# Patient Record
Sex: MALE | Race: WHITE | NOT HISPANIC OR LATINO
[De-identification: names, ages, dates, MRNs, and addresses within clinical notes are randomized per-mention and may not be internally consistent; named-entity substitution may affect disease eponyms.]

---

## 2015-03-31 RX ORDER — AMLODIPINE BESYLATE 2.5 MG/1
2 TABLET ORAL
Qty: 0 | Refills: 0 | DISCHARGE
Start: 2015-03-31

## 2017-01-03 ENCOUNTER — RX RENEWAL (OUTPATIENT)
Age: 62
End: 2017-01-03

## 2017-01-09 ENCOUNTER — APPOINTMENT (OUTPATIENT)
Dept: CARDIOLOGY | Facility: CLINIC | Age: 62
End: 2017-01-09

## 2017-01-09 VITALS
HEART RATE: 72 BPM | OXYGEN SATURATION: 97 % | SYSTOLIC BLOOD PRESSURE: 130 MMHG | HEIGHT: 73 IN | DIASTOLIC BLOOD PRESSURE: 75 MMHG | BODY MASS INDEX: 41.35 KG/M2 | WEIGHT: 312 LBS

## 2017-05-22 ENCOUNTER — APPOINTMENT (OUTPATIENT)
Dept: CARDIOLOGY | Facility: CLINIC | Age: 62
End: 2017-05-22

## 2017-05-22 ENCOUNTER — NON-APPOINTMENT (OUTPATIENT)
Age: 62
End: 2017-05-22

## 2017-05-22 VITALS
HEART RATE: 62 BPM | WEIGHT: 282 LBS | OXYGEN SATURATION: 100 % | SYSTOLIC BLOOD PRESSURE: 136 MMHG | DIASTOLIC BLOOD PRESSURE: 72 MMHG | BODY MASS INDEX: 37.21 KG/M2

## 2017-05-26 ENCOUNTER — RX RENEWAL (OUTPATIENT)
Age: 62
End: 2017-05-26

## 2017-06-22 ENCOUNTER — APPOINTMENT (OUTPATIENT)
Dept: CARDIOLOGY | Facility: CLINIC | Age: 62
End: 2017-06-22

## 2017-06-25 ENCOUNTER — INPATIENT (INPATIENT)
Facility: HOSPITAL | Age: 62
LOS: 0 days | Discharge: HOME | DRG: 204 | End: 2017-06-26
Attending: HOSPITALIST | Admitting: HOSPITALIST
Payer: COMMERCIAL

## 2017-06-25 VITALS
SYSTOLIC BLOOD PRESSURE: 141 MMHG | HEART RATE: 77 BPM | DIASTOLIC BLOOD PRESSURE: 74 MMHG | RESPIRATION RATE: 18 BRPM | OXYGEN SATURATION: 97 % | TEMPERATURE: 98 F

## 2017-06-25 DIAGNOSIS — E78.5 HYPERLIPIDEMIA, UNSPECIFIED: ICD-10-CM

## 2017-06-25 DIAGNOSIS — Z29.9 ENCOUNTER FOR PROPHYLACTIC MEASURES, UNSPECIFIED: ICD-10-CM

## 2017-06-25 DIAGNOSIS — Z95.5 PRESENCE OF CORONARY ANGIOPLASTY IMPLANT AND GRAFT: Chronic | ICD-10-CM

## 2017-06-25 DIAGNOSIS — R06.02 SHORTNESS OF BREATH: ICD-10-CM

## 2017-06-25 DIAGNOSIS — I25.10 ATHEROSCLEROTIC HEART DISEASE OF NATIVE CORONARY ARTERY WITHOUT ANGINA PECTORIS: ICD-10-CM

## 2017-06-25 DIAGNOSIS — E11.9 TYPE 2 DIABETES MELLITUS WITHOUT COMPLICATIONS: ICD-10-CM

## 2017-06-25 DIAGNOSIS — I10 ESSENTIAL (PRIMARY) HYPERTENSION: ICD-10-CM

## 2017-06-25 LAB
ALBUMIN SERPL ELPH-MCNC: 4.1 G/DL — SIGNIFICANT CHANGE UP (ref 3.3–5)
ALP SERPL-CCNC: 85 U/L — SIGNIFICANT CHANGE UP (ref 40–120)
ALT FLD-CCNC: 16 U/L RC — SIGNIFICANT CHANGE UP (ref 10–45)
ANION GAP SERPL CALC-SCNC: 11 MMOL/L — SIGNIFICANT CHANGE UP (ref 5–17)
APTT BLD: 40.4 SEC — HIGH (ref 27.5–37.4)
AST SERPL-CCNC: 12 U/L — SIGNIFICANT CHANGE UP (ref 10–40)
BASOPHILS # BLD AUTO: 0.1 K/UL — SIGNIFICANT CHANGE UP (ref 0–0.2)
BASOPHILS NFR BLD AUTO: 0.7 % — SIGNIFICANT CHANGE UP (ref 0–2)
BILIRUB SERPL-MCNC: 0.3 MG/DL — SIGNIFICANT CHANGE UP (ref 0.2–1.2)
BUN SERPL-MCNC: 14 MG/DL — SIGNIFICANT CHANGE UP (ref 7–23)
CALCIUM SERPL-MCNC: 9.5 MG/DL — SIGNIFICANT CHANGE UP (ref 8.4–10.5)
CHLORIDE SERPL-SCNC: 100 MMOL/L — SIGNIFICANT CHANGE UP (ref 96–108)
CK MB BLD-MCNC: 1.6 % — SIGNIFICANT CHANGE UP (ref 0–3.5)
CK MB CFR SERPL CALC: 2.4 NG/ML — SIGNIFICANT CHANGE UP (ref 0–6.7)
CK SERPL-CCNC: 146 U/L — SIGNIFICANT CHANGE UP (ref 30–200)
CO2 SERPL-SCNC: 27 MMOL/L — SIGNIFICANT CHANGE UP (ref 22–31)
CREAT SERPL-MCNC: 1 MG/DL — SIGNIFICANT CHANGE UP (ref 0.5–1.3)
EOSINOPHIL # BLD AUTO: 0.1 K/UL — SIGNIFICANT CHANGE UP (ref 0–0.5)
EOSINOPHIL NFR BLD AUTO: 1.7 % — SIGNIFICANT CHANGE UP (ref 0–6)
GAS PNL BLDV: SIGNIFICANT CHANGE UP
GLUCOSE SERPL-MCNC: 146 MG/DL — HIGH (ref 70–99)
HCT VFR BLD CALC: 39 % — SIGNIFICANT CHANGE UP (ref 39–50)
HGB BLD-MCNC: 13.7 G/DL — SIGNIFICANT CHANGE UP (ref 13–17)
INR BLD: 1.01 RATIO — SIGNIFICANT CHANGE UP (ref 0.88–1.16)
LYMPHOCYTES # BLD AUTO: 1.8 K/UL — SIGNIFICANT CHANGE UP (ref 1–3.3)
LYMPHOCYTES # BLD AUTO: 22.8 % — SIGNIFICANT CHANGE UP (ref 13–44)
MCHC RBC-ENTMCNC: 29.8 PG — SIGNIFICANT CHANGE UP (ref 27–34)
MCHC RBC-ENTMCNC: 35.2 GM/DL — SIGNIFICANT CHANGE UP (ref 32–36)
MCV RBC AUTO: 84.9 FL — SIGNIFICANT CHANGE UP (ref 80–100)
MONOCYTES # BLD AUTO: 0.6 K/UL — SIGNIFICANT CHANGE UP (ref 0–0.9)
MONOCYTES NFR BLD AUTO: 7.4 % — SIGNIFICANT CHANGE UP (ref 2–14)
NEUTROPHILS # BLD AUTO: 5.3 K/UL — SIGNIFICANT CHANGE UP (ref 1.8–7.4)
NEUTROPHILS NFR BLD AUTO: 67.3 % — SIGNIFICANT CHANGE UP (ref 43–77)
NT-PROBNP SERPL-SCNC: 46 PG/ML — SIGNIFICANT CHANGE UP (ref 0–300)
PLATELET # BLD AUTO: 262 K/UL — SIGNIFICANT CHANGE UP (ref 150–400)
POTASSIUM SERPL-MCNC: 3.6 MMOL/L — SIGNIFICANT CHANGE UP (ref 3.5–5.3)
POTASSIUM SERPL-SCNC: 3.6 MMOL/L — SIGNIFICANT CHANGE UP (ref 3.5–5.3)
PROT SERPL-MCNC: 6.9 G/DL — SIGNIFICANT CHANGE UP (ref 6–8.3)
PROTHROM AB SERPL-ACNC: 10.9 SEC — SIGNIFICANT CHANGE UP (ref 9.8–12.7)
RBC # BLD: 4.6 M/UL — SIGNIFICANT CHANGE UP (ref 4.2–5.8)
RBC # FLD: 12.9 % — SIGNIFICANT CHANGE UP (ref 10.3–14.5)
SODIUM SERPL-SCNC: 138 MMOL/L — SIGNIFICANT CHANGE UP (ref 135–145)
TROPONIN T SERPL-MCNC: <0.01 NG/ML — SIGNIFICANT CHANGE UP (ref 0–0.06)
WBC # BLD: 7.9 K/UL — SIGNIFICANT CHANGE UP (ref 3.8–10.5)
WBC # FLD AUTO: 7.9 K/UL — SIGNIFICANT CHANGE UP (ref 3.8–10.5)

## 2017-06-25 PROCEDURE — 99285 EMERGENCY DEPT VISIT HI MDM: CPT | Mod: 25

## 2017-06-25 PROCEDURE — 99223 1ST HOSP IP/OBS HIGH 75: CPT | Mod: AI,GC

## 2017-06-25 PROCEDURE — 71010: CPT | Mod: 26

## 2017-06-25 PROCEDURE — 93010 ELECTROCARDIOGRAM REPORT: CPT | Mod: NC

## 2017-06-25 RX ORDER — ASPIRIN/CALCIUM CARB/MAGNESIUM 324 MG
325 TABLET ORAL DAILY
Qty: 0 | Refills: 0 | Status: DISCONTINUED | OUTPATIENT
Start: 2017-06-26 | End: 2017-06-26

## 2017-06-25 RX ORDER — DEXTROSE 50 % IN WATER 50 %
1 SYRINGE (ML) INTRAVENOUS ONCE
Qty: 0 | Refills: 0 | Status: DISCONTINUED | OUTPATIENT
Start: 2017-06-25 | End: 2017-06-26

## 2017-06-25 RX ORDER — GLUCAGON INJECTION, SOLUTION 0.5 MG/.1ML
1 INJECTION, SOLUTION SUBCUTANEOUS ONCE
Qty: 0 | Refills: 0 | Status: DISCONTINUED | OUTPATIENT
Start: 2017-06-25 | End: 2017-06-26

## 2017-06-25 RX ORDER — DEXTROSE 50 % IN WATER 50 %
25 SYRINGE (ML) INTRAVENOUS ONCE
Qty: 0 | Refills: 0 | Status: DISCONTINUED | OUTPATIENT
Start: 2017-06-25 | End: 2017-06-26

## 2017-06-25 RX ORDER — LOSARTAN POTASSIUM 100 MG/1
100 TABLET, FILM COATED ORAL DAILY
Qty: 0 | Refills: 0 | Status: DISCONTINUED | OUTPATIENT
Start: 2017-06-26 | End: 2017-06-26

## 2017-06-25 RX ORDER — ASPIRIN/CALCIUM CARB/MAGNESIUM 324 MG
1 TABLET ORAL
Qty: 0 | Refills: 0 | COMMUNITY

## 2017-06-25 RX ORDER — INSULIN LISPRO 100/ML
VIAL (ML) SUBCUTANEOUS
Qty: 0 | Refills: 0 | Status: DISCONTINUED | OUTPATIENT
Start: 2017-06-25 | End: 2017-06-26

## 2017-06-25 RX ORDER — SIMVASTATIN 20 MG/1
40 TABLET, FILM COATED ORAL AT BEDTIME
Qty: 0 | Refills: 0 | Status: DISCONTINUED | OUTPATIENT
Start: 2017-06-26 | End: 2017-06-26

## 2017-06-25 RX ORDER — AMLODIPINE BESYLATE 2.5 MG/1
2.5 TABLET ORAL DAILY
Qty: 0 | Refills: 0 | Status: DISCONTINUED | OUTPATIENT
Start: 2017-06-26 | End: 2017-06-26

## 2017-06-25 RX ORDER — HYDROCHLOROTHIAZIDE 25 MG
12.5 TABLET ORAL DAILY
Qty: 0 | Refills: 0 | Status: DISCONTINUED | OUTPATIENT
Start: 2017-06-26 | End: 2017-06-26

## 2017-06-25 RX ORDER — CLOPIDOGREL BISULFATE 75 MG/1
75 TABLET, FILM COATED ORAL DAILY
Qty: 0 | Refills: 0 | Status: DISCONTINUED | OUTPATIENT
Start: 2017-06-26 | End: 2017-06-26

## 2017-06-25 RX ORDER — DEXTROSE 50 % IN WATER 50 %
12.5 SYRINGE (ML) INTRAVENOUS ONCE
Qty: 0 | Refills: 0 | Status: DISCONTINUED | OUTPATIENT
Start: 2017-06-25 | End: 2017-06-26

## 2017-06-25 RX ORDER — ASPIRIN/CALCIUM CARB/MAGNESIUM 324 MG
243 TABLET ORAL ONCE
Qty: 0 | Refills: 0 | Status: DISCONTINUED | OUTPATIENT
Start: 2017-06-25 | End: 2017-06-25

## 2017-06-25 RX ORDER — SODIUM CHLORIDE 9 MG/ML
1000 INJECTION, SOLUTION INTRAVENOUS
Qty: 0 | Refills: 0 | Status: DISCONTINUED | OUTPATIENT
Start: 2017-06-25 | End: 2017-06-26

## 2017-06-25 RX ORDER — HEPARIN SODIUM 5000 [USP'U]/ML
5000 INJECTION INTRAVENOUS; SUBCUTANEOUS EVERY 8 HOURS
Qty: 0 | Refills: 0 | Status: DISCONTINUED | OUTPATIENT
Start: 2017-06-25 | End: 2017-06-26

## 2017-06-25 RX ORDER — METOPROLOL TARTRATE 50 MG
25 TABLET ORAL
Qty: 0 | Refills: 0 | Status: DISCONTINUED | OUTPATIENT
Start: 2017-06-25 | End: 2017-06-26

## 2017-06-25 NOTE — ED ADULT NURSE NOTE - PMH
BPH (benign prostatic hyperplasia)    CAD (coronary artery disease)    Dyslipidemia    Gout    Hypertension    Morbid obesity

## 2017-06-25 NOTE — H&P ADULT - PROBLEM SELECTOR PLAN 1
One day hx of SOB, exacerbated on exertion, not associated with CP/jaw pain/shoulder pain. Concern for unstable angina given extensive cardiac history (17 stents) and T-wave inversions on serial EKG's in ED.   -Contact outpatient cardiologist in AM.  -TTE  -Trend CE's  -Repeat EKG given t-wave inversion in V1 on last EKG  -Receieved ASA in ED. c/w home ASA 81, simvastatin. One day hx of SOB, exacerbated on exertion, not associated with CP/jaw pain/shoulder pain. Concern for unstable angina given extensive cardiac history (17 stents) and T-wave inversions on serial EKG's in ED.   -Contact outpatient cardiologist in AM.  -TTE  -Trend CE's  -Repeat EKG given t-wave inversion in V1 on last EKG  -Received ASA in ED. c/w home , simvastatin.

## 2017-06-25 NOTE — ED ADULT NURSE NOTE - ED STAT RN HANDOFF DETAILS 2
Report received from Mely FLORES. Patient is admitted to telemetry. A&Ox3, VSS, wife at bedside. Safety provided, will continue to monitor.

## 2017-06-25 NOTE — ED PROVIDER NOTE - MEDICAL DECISION MAKING DETAILS
61 y.o. male pw shortness of breath with exertion and lightheadedness, concern for cardiac cause (i.e chf vs ACS), PE unlikely given presentation. Will check labs, cardiac enzymes, ekg, cxr, and likely admit.

## 2017-06-25 NOTE — H&P ADULT - HISTORY OF PRESENT ILLNESS
58yo M with PMH of HTN, HLD, gout, DM2 on metformin, CAD with prior stents x 17 (last cath April 2015 s/p PCI LAD), obesity p/w shortness of breath x 1 day beginning after awakening this AM, exacerbated by exertion.     ED course: Vitals T 98.1, 's-150's/70's, HR 60's-70's, RR 18, SpO2 98% on RA. No medications administered. Labs significant for aPTT 40.4, Glucose 146, BNP 46, CE's negative, VBG pH 7.46 60yo M with PMH of HTN, HLD, gout, DM2 on metformin, CAD with prior stents x 17 (last cath April 2015 s/p PCI LAD), obesity p/w shortness of breath x 1 day beginning after awakening this AM, exacerbated by exertion. He woke up this morning and felt acutely short of breath without associated chest pain, n/v/d/c, jaw or shoulder pain, abdominal pain. Of note, the patient feels short of breath with mild exertion at baseline and has extensive cardiac history as above. He was also recently diagnosed with DM2 about 5 months ago with blood glucose in 300's at that time and started on metformin. He has a 40 pack-year smoking history, smoking 2 ppd for 20 years, quitting in 2009. His last cardiac cath in our system was in April 2015 at which time he had PCI to LAD.     ED course: Vitals T 98.1, 's-150's/70's, HR 60's-70's, RR 18, SpO2 98% on RA. No medications administered. Labs significant for aPTT 40.4, Glucose 146, BNP 46, CE's negative, VBG pH 7.46. Serial EKG's show normal sinus rhythm with interval development of T-wave inversion in V1, no ABDIAZIZ. CXR clear lungs. Ordered for aspirin in the ED. 58yo M with PMH of HTN, HLD, gout, DM2 on metformin, CAD with prior stents x 17 (last cath April 2015 s/p PCI LAD), obesity p/w shortness of breath x 1 day beginning after awakening this AM, exacerbated by exertion. He woke up this morning and felt acutely short of breath without associated chest pain, n/v/d/c, jaw or shoulder pain, abdominal pain. Of note, the patient feels short of breath with mild exertion at baseline and has extensive cardiac history as above. He was also recently diagnosed with DM2 about 5 months ago with blood glucose in 300's at that time and started on metformin. He has a 40 pack-year smoking history, smoking 2 ppd for 20 years, quitting in 2009. His last cardiac cath in our system was in April 2015 at which time he had PCI to LAD. Pt denies recent travel history, prolonged immobilization, LE pain or swelling and has no VTE history.     ED course: Vitals T 98.1, 's-150's/70's, HR 60's-70's, RR 18, SpO2 98% on RA. No medications administered. Labs significant for aPTT 40.4, Glucose 146, BNP 46, CE's negative, VBG pH 7.46. Serial EKG's show normal sinus rhythm with interval development of T-wave inversion in V1, no ABDIAZIZ. CXR clear lungs. Ordered for aspirin in the ED. 62yo M with PMH of HTN, HLD, gout, DM2 on metformin, CAD with prior stents x 17 (last cath April 2015 s/p PCI LAD), obesity p/w shortness of breath x 1 day beginning after awakening this AM, exacerbated by exertion. He woke up this morning and felt acutely short of breath without associated chest pain, n/v/d/c, jaw or shoulder pain, abdominal pain. Of note, the patient feels short of breath with mild exertion at baseline and has extensive cardiac history as above. He was also recently diagnosed with DM2 about 5 months ago with blood glucose in 300's at that time and started on metformin. He has a 40 pack-year smoking history, smoking 2 ppd for 20 years, quitting in 2009. His last cardiac cath in our system was in April 2015 at which time he had PCI to LAD. Pt denies recent travel history, prolonged immobilization, LE pain or swelling and has no VTE history.     ED course: Vitals T 98.1, 's-150's/70's, HR 60's-70's, RR 18, SpO2 98% on RA. No medications administered. Labs significant for aPTT 40.4, Glucose 146, BNP 46, CE's negative, VBG pH 7.46. Serial EKG's show normal sinus rhythm with interval development of T-wave inversion in V1, no ABDIAZIZ. CXR clear lungs. Ordered for aspirin in the ED.

## 2017-06-25 NOTE — H&P ADULT - PROBLEM SELECTOR PLAN 2
Extensive cardiac history with 17 stents, followed closely by outpatient cardiology. As above, will r/o ACS given new SOB at rest concerning for unstable angina.   -As above.  -c/w home ASA, statin, plavix 75 q24h.

## 2017-06-25 NOTE — H&P ADULT - PMH
BPH (benign prostatic hyperplasia)    CAD (coronary artery disease)    Diabetes    Dyslipidemia    Gout    Hypertension    Morbid obesity

## 2017-06-25 NOTE — H&P ADULT - ATTENDING COMMENTS
60yo M with PMH of HTN, HLD, gout, DM2 on metformin, CAD with prior stents x 17 (last cath April 2015 s/p PCI LAD), obesity p/w shortness of breath, reports increased CAGE and decreased ET over past few weeks,  NAD on exam, neg cardiac enzymes x 1 , serial EKG with twave changes in v1 and AVL; High risk patient with anginal equivalent symptoms currently resolved ad stable will therefore not treat with ACS protocol at this time, but will monitor on telemetry and obtain serial cardiac enzymes ,  will be seen by cardiology in daytime to decide if patient needs cath.

## 2017-06-25 NOTE — ED ADULT NURSE NOTE - PSH
History of coronary artery stent placement  Last stent was 2 weeks ago  S/P coronary artery stent placement  x 1 to Prox circ & IVUS

## 2017-06-25 NOTE — H&P ADULT - NSHPSOCIALHISTORY_GEN_ALL_CORE
Former smoker 40 pack years, quit 2009, smoked 20 years 2 ppd. No alcohol or recreational drug use. Lives with his wife.

## 2017-06-25 NOTE — H&P ADULT - NSHPLABSRESULTS_GEN_ALL_CORE
13.7   7.9   )-----------( 262      ( 25 Jun 2017 17:10 )             39.0     06-25    138  |  100  |  14  ----------------------------<  146<H>  3.6   |  27  |  1.00    Ca    9.5      25 Jun 2017 17:10    TPro  6.9  /  Alb  4.1  /  TBili  0.3  /  DBili  x   /  AST  12  /  ALT  16  /  AlkPhos  85  06-25    CARDIAC MARKERS ( 25 Jun 2017 17:10 )  x     / <0.01 ng/mL / 146 U/L / x     / 2.4 ng/mL EKG personally reviewed by me and showed T-wave inversions on repeat EKG in V1, normal sinus rhythm.    CXR personally reviewed by me and showed clear lungs.    Labs personally reviewed by me and significant for BNP normal, CE's negative x 1, Glucose 140's.

## 2017-06-25 NOTE — H&P ADULT - ASSESSMENT
60yo M with PMH of HTN, HLD, gout, DM2 on metformin, CAD with prior stents x 17 (last cath April 2015 s/p PCI LAD), obesity p/w shortness of breath x 1 day beginning after awakening this AM, exacerbated by exertion a/w r/o ACS with T-wave inversions on serial EKG's in ED. 62yo M with PMH of HTN, HLD, gout, DM2 on metformin, CAD with prior stents x 17 (last cath April 2015 s/p PCI LAD), obesity p/w shortness of breath x 1 day beginning after awakening this AM, exacerbated by exertion a/w r/o ACS with T-wave inversions on serial EKG's in ED.

## 2017-06-25 NOTE — ED ADULT NURSE NOTE - OBJECTIVE STATEMENT
61 y.o male presents to the ed c.o shortness of breath and not feeling well. hx of MI in 2009, 2010, 2015, hx of diabetes type 2, 17 stents, smoking quit in 2009, hld. pt states " I just didn't feel well today, I don't have chest pain, but I do have a heaviness in my chest. right leg swelling noted by wife. pt is compliant with all medications. placed on monitor, NRS noted. lung sounds clear throughout, pt vital signs are stable. denies cough/back pain/chills/fever/n/v/d/left arm tingling/numbness/denies neck/jaw pain. wife at the bedside. denies dizziness/lightheadedness /visual changes.   Patient undressed and placed into gown, call bell in hand and side rails up for safety. warm blanket provided, vital signs stable, pt in no acute distress.

## 2017-06-25 NOTE — H&P ADULT - PROBLEM SELECTOR PLAN 3
Recently diagnosed 5 months ago with DM2 with BG at that time in the 300's. Has been on metformin (unknown dose) BID.   -Hold home metformin.  -ISS and hypoglycemia protocol.  -FS's with meals and qhs.

## 2017-06-25 NOTE — ED PROVIDER NOTE - ATTENDING CONTRIBUTION TO CARE
Attending MD Cagle.  Agree with above.  Pt has hx of 17 stents on ASA, Plavix, DMII, on metformin presenting with exertional CP x 1 day.  No CP.  Endorses light-headedness with exertion.  No recent travel, fevers, chills, cough.  Pt is well appearing with soft rales to bilateral lower lobes.  Pt has markedly elevated ACS risk given hx and risk factors.  No pleuritic CP/hypoxia/tachycardia and anticoagulated so likely low risk PE.  Pt is relatively asymptomatic at rest.  Planned ACS eval in ED.  Pt’s presentation is c/w stent occlusion/ACS vs. CHF.  BNP is not elevated, CXR is clear.  No marked LE edema.  Likely ACS/blockage, EKG at rest non-actionable.  Warrants admission for stress test/cath for further eval and management.  Pt had echo recently as outpt but these results are not available in ED today and pt has not been made aware of findings.  Stable for admission to [facility] medicine.

## 2017-06-25 NOTE — ED PROVIDER NOTE - PHYSICAL EXAMINATION
Gen: Well appearing, NAD, AOx3  Head: NCAT  HEENT: MMM, normal conjunctiva  Lung: rales at bases, no wheezes or rhonchi  CV: S1/S2, no murmurs, rubs or gallops  Abd: soft, NTND, no rebound or guarding  MSK: No CVA tenderness. No edema, no visible deformities  Neuro: No focal neurologic deficits.   Skin: Warm and dry, no evidence of rash  Psych: normal mood and affect

## 2017-06-25 NOTE — ED PROVIDER NOTE - OBJECTIVE STATEMENT
61 y.o. male hx of CAD sp multiple stents on ASA and plavix, DM on metformin, HTN, HLD pw shortness of breath x 1 day, started after waking this morning, worse with exertion, better with rest. No associated chest pain or nausea, diaphoresis. Also with lightheadedness at time. No priors. No lower extremity swelling. No recent travel.

## 2017-06-25 NOTE — H&P ADULT - NSHPPHYSICALEXAM_GEN_ALL_CORE
PHYSICAL EXAM:  GENERAL: NAD, well-developed, obese  HEAD:  Atraumatic, Normocephalic  EYES: EOMI, PERRLA, conjunctiva and sclera clear  NECK: Supple, No JVD  CHEST/LUNG: Clear to auscultation bilaterally; No wheeze  HEART: Regular rate and rhythm; No murmurs, rubs, or gallops  ABDOMEN: Soft, Nontender, Nondistended; Bowel sounds present  EXTREMITIES:  2+ Peripheral Pulses, No clubbing, cyanosis, or edema  PSYCH: AAOx3  NEUROLOGY: non-focal  SKIN: No rashes or lesions

## 2017-06-25 NOTE — H&P ADULT - PROBLEM SELECTOR PLAN 4
BP well-controlled in ED. Will c/w home anti-hypertensives.  -c/w home amlodipine 2.5mg q24h, HCTZ, losartan 100mg q24h, metoprolol 25mg BID.

## 2017-06-25 NOTE — ED PROVIDER NOTE - NS ED ROS FT
ROS: denies HA, weakness, dizziness, fevers/chills, nausea/vomiting, chest pain, diaphoresis, abdominal pain, back/neck pain, dysuria/hematuria, or rash  +sob, lightheadedness

## 2017-06-26 VITALS
HEART RATE: 56 BPM | RESPIRATION RATE: 18 BRPM | SYSTOLIC BLOOD PRESSURE: 162 MMHG | TEMPERATURE: 98 F | DIASTOLIC BLOOD PRESSURE: 90 MMHG | OXYGEN SATURATION: 99 %

## 2017-06-26 LAB
ALBUMIN SERPL ELPH-MCNC: 3.9 G/DL — SIGNIFICANT CHANGE UP (ref 3.3–5)
ALP SERPL-CCNC: 69 U/L — SIGNIFICANT CHANGE UP (ref 40–120)
ALT FLD-CCNC: 14 U/L RC — SIGNIFICANT CHANGE UP (ref 10–45)
ANION GAP SERPL CALC-SCNC: 12 MMOL/L — SIGNIFICANT CHANGE UP (ref 5–17)
AST SERPL-CCNC: 11 U/L — SIGNIFICANT CHANGE UP (ref 10–40)
BILIRUB SERPL-MCNC: 0.4 MG/DL — SIGNIFICANT CHANGE UP (ref 0.2–1.2)
BUN SERPL-MCNC: 14 MG/DL — SIGNIFICANT CHANGE UP (ref 7–23)
CALCIUM SERPL-MCNC: 9.3 MG/DL — SIGNIFICANT CHANGE UP (ref 8.4–10.5)
CHLORIDE SERPL-SCNC: 101 MMOL/L — SIGNIFICANT CHANGE UP (ref 96–108)
CK MB BLD-MCNC: 1.7 % — SIGNIFICANT CHANGE UP (ref 0–3.5)
CK MB BLD-MCNC: 1.9 % — SIGNIFICANT CHANGE UP (ref 0–3.5)
CK MB CFR SERPL CALC: 1.8 NG/ML — SIGNIFICANT CHANGE UP (ref 0–6.7)
CK MB CFR SERPL CALC: 1.9 NG/ML — SIGNIFICANT CHANGE UP (ref 0–6.7)
CK SERPL-CCNC: 111 U/L — SIGNIFICANT CHANGE UP (ref 30–200)
CK SERPL-CCNC: 95 U/L — SIGNIFICANT CHANGE UP (ref 30–200)
CO2 SERPL-SCNC: 27 MMOL/L — SIGNIFICANT CHANGE UP (ref 22–31)
CREAT SERPL-MCNC: 0.85 MG/DL — SIGNIFICANT CHANGE UP (ref 0.5–1.3)
GLUCOSE SERPL-MCNC: 140 MG/DL — HIGH (ref 70–99)
HBA1C BLD-MCNC: 6.2 % — HIGH (ref 4–5.6)
HCT VFR BLD CALC: 38.8 % — LOW (ref 39–50)
HGB BLD-MCNC: 13.1 G/DL — SIGNIFICANT CHANGE UP (ref 13–17)
MAGNESIUM SERPL-MCNC: 2 MG/DL — SIGNIFICANT CHANGE UP (ref 1.6–2.6)
MCHC RBC-ENTMCNC: 28.2 PG — SIGNIFICANT CHANGE UP (ref 27–34)
MCHC RBC-ENTMCNC: 33.8 GM/DL — SIGNIFICANT CHANGE UP (ref 32–36)
MCV RBC AUTO: 83.6 FL — SIGNIFICANT CHANGE UP (ref 80–100)
PHOSPHATE SERPL-MCNC: 3.5 MG/DL — SIGNIFICANT CHANGE UP (ref 2.5–4.5)
PLATELET # BLD AUTO: 235 K/UL — SIGNIFICANT CHANGE UP (ref 150–400)
POTASSIUM SERPL-MCNC: 3.6 MMOL/L — SIGNIFICANT CHANGE UP (ref 3.5–5.3)
POTASSIUM SERPL-SCNC: 3.6 MMOL/L — SIGNIFICANT CHANGE UP (ref 3.5–5.3)
PROT SERPL-MCNC: 6.6 G/DL — SIGNIFICANT CHANGE UP (ref 6–8.3)
RBC # BLD: 4.64 M/UL — SIGNIFICANT CHANGE UP (ref 4.2–5.8)
RBC # FLD: 14.3 % — SIGNIFICANT CHANGE UP (ref 10.3–14.5)
SODIUM SERPL-SCNC: 140 MMOL/L — SIGNIFICANT CHANGE UP (ref 135–145)
TROPONIN T SERPL-MCNC: <0.01 NG/ML — SIGNIFICANT CHANGE UP (ref 0–0.06)
TROPONIN T SERPL-MCNC: <0.01 NG/ML — SIGNIFICANT CHANGE UP (ref 0–0.06)
WBC # BLD: 7.21 K/UL — SIGNIFICANT CHANGE UP (ref 3.8–10.5)
WBC # FLD AUTO: 7.21 K/UL — SIGNIFICANT CHANGE UP (ref 3.8–10.5)

## 2017-06-26 PROCEDURE — 82553 CREATINE MB FRACTION: CPT

## 2017-06-26 PROCEDURE — 85014 HEMATOCRIT: CPT

## 2017-06-26 PROCEDURE — 99239 HOSP IP/OBS DSCHRG MGMT >30: CPT

## 2017-06-26 PROCEDURE — 80053 COMPREHEN METABOLIC PANEL: CPT

## 2017-06-26 PROCEDURE — 71045 X-RAY EXAM CHEST 1 VIEW: CPT

## 2017-06-26 PROCEDURE — 85027 COMPLETE CBC AUTOMATED: CPT

## 2017-06-26 PROCEDURE — 83735 ASSAY OF MAGNESIUM: CPT

## 2017-06-26 PROCEDURE — 93005 ELECTROCARDIOGRAM TRACING: CPT

## 2017-06-26 PROCEDURE — 82435 ASSAY OF BLOOD CHLORIDE: CPT

## 2017-06-26 PROCEDURE — 85610 PROTHROMBIN TIME: CPT

## 2017-06-26 PROCEDURE — 83605 ASSAY OF LACTIC ACID: CPT

## 2017-06-26 PROCEDURE — 84484 ASSAY OF TROPONIN QUANT: CPT

## 2017-06-26 PROCEDURE — 82803 BLOOD GASES ANY COMBINATION: CPT

## 2017-06-26 PROCEDURE — 82330 ASSAY OF CALCIUM: CPT

## 2017-06-26 PROCEDURE — 82550 ASSAY OF CK (CPK): CPT

## 2017-06-26 PROCEDURE — 85730 THROMBOPLASTIN TIME PARTIAL: CPT

## 2017-06-26 PROCEDURE — 84132 ASSAY OF SERUM POTASSIUM: CPT

## 2017-06-26 PROCEDURE — 82947 ASSAY GLUCOSE BLOOD QUANT: CPT

## 2017-06-26 PROCEDURE — 83036 HEMOGLOBIN GLYCOSYLATED A1C: CPT

## 2017-06-26 PROCEDURE — 84295 ASSAY OF SERUM SODIUM: CPT

## 2017-06-26 PROCEDURE — G0378: CPT

## 2017-06-26 PROCEDURE — 84100 ASSAY OF PHOSPHORUS: CPT

## 2017-06-26 PROCEDURE — 83880 ASSAY OF NATRIURETIC PEPTIDE: CPT

## 2017-06-26 PROCEDURE — 99285 EMERGENCY DEPT VISIT HI MDM: CPT | Mod: 25

## 2017-06-26 RX ADMIN — CLOPIDOGREL BISULFATE 75 MILLIGRAM(S): 75 TABLET, FILM COATED ORAL at 11:35

## 2017-06-26 RX ADMIN — Medication 25 MILLIGRAM(S): at 05:18

## 2017-06-26 RX ADMIN — Medication 325 MILLIGRAM(S): at 11:35

## 2017-06-26 RX ADMIN — Medication 12.5 MILLIGRAM(S): at 05:18

## 2017-06-26 RX ADMIN — AMLODIPINE BESYLATE 2.5 MILLIGRAM(S): 2.5 TABLET ORAL at 05:18

## 2017-06-26 RX ADMIN — LOSARTAN POTASSIUM 100 MILLIGRAM(S): 100 TABLET, FILM COATED ORAL at 05:18

## 2017-06-26 NOTE — DISCHARGE NOTE ADULT - PLAN OF CARE
Resolution of symptoms You were ruled out for a heart attack, with normal cardiac markers, and EKG. Your out-patient echo was reviewed. You do not have signs of fluid overload on exam. Please continue to take all your medications. And follow up with your cardiologist and PMD this week. Continue to take your medications as prescribed. Please see your cardiologist this week. Continue to take metformin. Follow up with your PMD in 1-2 weeks.

## 2017-06-26 NOTE — DISCHARGE NOTE ADULT - MEDICATION SUMMARY - MEDICATIONS TO TAKE
I will START or STAY ON the medications listed below when I get home from the hospital:    aspirin 81 mg oral tablet  -- 4 tab(s) by mouth once a day  -- Indication: For CAD (coronary artery disease)    metFORMIN 500 mg oral tablet  -- 1 tab(s) by mouth 2 times a day  -- Indication: For Diabetes    simvastatin 40 mg oral tablet  -- 1 tab(s) by mouth once a day (at bedtime)  -- Indication: For CAD (coronary artery disease)    hydrochlorothiazide-losartan 12.5 mg-100 mg oral tablet  -- 1 tab(s) by mouth once a day  -- Indication: For Hypertension    Plavix 75 mg oral tablet  -- 1 tab(s) by mouth once a day  -- Indication: For CAD (coronary artery disease)    metoprolol tartrate 25 mg oral tablet  -- 1 tab(s) by mouth 2 times a day  -- Indication: For CAD (coronary artery disease)    amLODIPine 2.5 mg oral tablet  -- 1 tab(s) by mouth once a day  -- Indication: For Hypertension    omega-3 polyunsaturated fatty acids 200 mg oral capsule  -- 1 cap(s) by mouth once a day  -- Indication: For HLD

## 2017-06-26 NOTE — DISCHARGE NOTE ADULT - CARE PLAN
Principal Discharge DX:	Shortness of breath  Goal:	Resolution of symptoms  Instructions for follow-up, activity and diet:	You were ruled out for a heart attack, with normal cardiac markers, and EKG. Your out-patient echo was reviewed. You do not have signs of fluid overload on exam. Please continue to take all your medications. And follow up with your cardiologist and PMD this week.  Secondary Diagnosis:	CAD (coronary artery disease)  Instructions for follow-up, activity and diet:	Continue to take your medications as prescribed. Please see your cardiologist this week.  Secondary Diagnosis:	Diabetes  Instructions for follow-up, activity and diet:	Continue to take metformin. Follow up with your PMD in 1-2 weeks.  Secondary Diagnosis:	Hypertension  Secondary Diagnosis:	Dyslipidemia

## 2017-06-26 NOTE — DISCHARGE NOTE ADULT - CARE PROVIDER_API CALL
Rahul Huynh), Cardiovascular Disease; Internal Medicine  [address] [place] [address]  Phone: [phone]  Fax: [phone]

## 2017-06-26 NOTE — PROGRESS NOTE ADULT - PROBLEM SELECTOR PLAN 2
Extensive cardiac history with 17 stents, followed closely by outpatient cardiology.   CE neg x 3. Will d/c home w/ cardiology f/u this week.  -c/w home ASA, statin, plavix 75 q24h.

## 2017-06-26 NOTE — DISCHARGE NOTE ADULT - PATIENT PORTAL LINK FT
“You can access the [employer] Patient Portal, offered by [facility], by registering with the following website: [email or web address]”

## 2017-06-26 NOTE — PROGRESS NOTE ADULT - ASSESSMENT
62yo M with PMH of HTN, HLD, gout, DM2 on metformin, CAD with prior stents x 17 (last cath April 2015 s/p PCI LAD), obesity p/w shortness of breath x 1 day beginning after awakening this AM, exacerbated by exertion r/o ACS w/ neg trop. TTE - w/ diastolic dysfunction

## 2017-06-26 NOTE — PROGRESS NOTE ADULT - SUBJECTIVE AND OBJECTIVE BOX
Patient is a 61y old  Male who presents with a chief complaint of shortness of breath x 1 day (25 Jun 2017 19:06)        SUBJECTIVE / OVERNIGHT EVENTS: Symptoms resolved.       MEDICATIONS  (STANDING):  simvastatin 40milliGRAM(s) Oral at bedtime  losartan 100milliGRAM(s) Oral daily  hydrochlorothiazide 12.5milliGRAM(s) Oral daily  clopidogrel Tablet 75milliGRAM(s) Oral daily  metoprolol 25milliGRAM(s) Oral two times a day  amLODIPine   Tablet 2.5milliGRAM(s) Oral daily  insulin lispro (HumaLOG) corrective regimen sliding scale  SubCutaneous three times a day before meals  dextrose 5%. 1000milliLiter(s) IV Continuous <Continuous>  dextrose 50% Injectable 12.5Gram(s) IV Push once  dextrose 50% Injectable 25Gram(s) IV Push once  dextrose 50% Injectable 25Gram(s) IV Push once  heparin  Injectable 5000Unit(s) SubCutaneous every 8 hours  aspirin 325milliGRAM(s) Oral daily    MEDICATIONS  (PRN):  dextrose Gel 1Dose(s) Oral once PRN Blood Glucose LESS THAN 70 milliGRAM(s)/deciLiter  glucagon  Injectable 1milliGRAM(s) IntraMuscular once PRN Glucose <70 milliGRAM(s)/deciLiter      Vital Signs Last 24 Hrs  T(C): 36.6, Max: 36.8 (06-25 @ 19:33)  HR: 56 (56 - 70)  BP: 162/90 (138/77 - 163/82)  RR: 18 (18 - 20)  SpO2: 99% (97% - 99%)  Wt(kg): --  CAPILLARY BLOOD GLUCOSE  133 (26 Jun 2017 11:39)  135 (26 Jun 2017 08:33)    I&O's Summary      PHYSICAL EXAM  GENERAL: NAD, obese  CHEST/LUNG: Clear to auscultation bilaterally; No wheeze, crackles  HEART: Regular rate and rhythm; No murmurs, rubs, or gallops  ABDOMEN: Soft, Nontender, Nondistended; Bowel sounds present  EXTREMITIES:  No edema  PSYCH: AAOx3      LABS:                        13.1   7.21  )-----------( 235      ( 26 Jun 2017 07:06 )             38.8     06-26    140  |  101  |  14  ----------------------------<  140<H>  3.6   |  27  |  0.85    Ca    9.3      26 Jun 2017 06:27  Phos  3.5     06-26  Mg     2.0     06-26    TPro  6.6  /  Alb  3.9  /  TBili  0.4  /  DBili  x   /  AST  11  /  ALT  14  /  AlkPhos  69  06-26    PT/INR - ( 25 Jun 2017 17:10 )   PT: 10.9 sec;   INR: 1.01 ratio         PTT - ( 25 Jun 2017 17:10 )  PTT:40.4 sec  CARDIAC MARKERS ( 26 Jun 2017 06:27 )  x     / <0.01 ng/mL / 95 U/L / x     / 1.8 ng/mL  CARDIAC MARKERS ( 26 Jun 2017 00:27 )  x     / <0.01 ng/mL / 111 U/L / x     / 1.9 ng/mL  CARDIAC MARKERS ( 25 Jun 2017 17:10 )  x     / <0.01 ng/mL / 146 U/L / x     / 2.4 ng/mL          RADIOLOGY & ADDITIONAL TESTS:    Imaging Personally Reviewed:  Consultant(s) Notes Reviewed:    Care Discussed with Consultants/Other Providers:

## 2017-06-26 NOTE — DISCHARGE NOTE ADULT - HOSPITAL COURSE
Patient was admitted with 1 day of CAGE. Also reports some mild exertional symptoms over the past month. ACS was ruled out with negative CE x 3. BNP was normal, and outpatient TTE was obtained that showed Ef 60%, and diastolic dysfunction. I discussed the results with the patient, and since he is now asymptomatic advised him to follow up with his cardiologist this week.

## 2017-06-29 ENCOUNTER — NON-APPOINTMENT (OUTPATIENT)
Age: 62
End: 2017-06-29

## 2017-06-29 ENCOUNTER — APPOINTMENT (OUTPATIENT)
Dept: CARDIOLOGY | Facility: CLINIC | Age: 62
End: 2017-06-29

## 2017-06-29 VITALS
BODY MASS INDEX: 37.11 KG/M2 | HEIGHT: 73 IN | SYSTOLIC BLOOD PRESSURE: 146 MMHG | HEART RATE: 72 BPM | DIASTOLIC BLOOD PRESSURE: 82 MMHG | OXYGEN SATURATION: 98 % | WEIGHT: 280 LBS

## 2017-07-12 ENCOUNTER — APPOINTMENT (OUTPATIENT)
Dept: CARDIOLOGY | Facility: CLINIC | Age: 62
End: 2017-07-12

## 2017-07-13 ENCOUNTER — APPOINTMENT (OUTPATIENT)
Dept: CARDIOLOGY | Facility: CLINIC | Age: 62
End: 2017-07-13

## 2017-07-20 ENCOUNTER — APPOINTMENT (OUTPATIENT)
Dept: CARDIOLOGY | Facility: CLINIC | Age: 62
End: 2017-07-20

## 2017-07-20 VITALS
HEIGHT: 73 IN | BODY MASS INDEX: 37.11 KG/M2 | OXYGEN SATURATION: 98 % | SYSTOLIC BLOOD PRESSURE: 155 MMHG | HEART RATE: 66 BPM | WEIGHT: 280 LBS | DIASTOLIC BLOOD PRESSURE: 81 MMHG

## 2017-10-30 ENCOUNTER — RX RENEWAL (OUTPATIENT)
Age: 62
End: 2017-10-30

## 2017-12-08 ENCOUNTER — RX RENEWAL (OUTPATIENT)
Age: 62
End: 2017-12-08

## 2018-01-17 ENCOUNTER — RX RENEWAL (OUTPATIENT)
Age: 63
End: 2018-01-17

## 2018-01-22 ENCOUNTER — FORM ENCOUNTER (OUTPATIENT)
Age: 63
End: 2018-01-22

## 2018-01-23 ENCOUNTER — OUTPATIENT (OUTPATIENT)
Dept: OUTPATIENT SERVICES | Facility: HOSPITAL | Age: 63
LOS: 1 days | End: 2018-01-23
Payer: COMMERCIAL

## 2018-01-23 ENCOUNTER — NON-APPOINTMENT (OUTPATIENT)
Age: 63
End: 2018-01-23

## 2018-01-23 ENCOUNTER — APPOINTMENT (OUTPATIENT)
Dept: CARDIOLOGY | Facility: CLINIC | Age: 63
End: 2018-01-23
Payer: COMMERCIAL

## 2018-01-23 ENCOUNTER — APPOINTMENT (OUTPATIENT)
Dept: RADIOLOGY | Facility: CLINIC | Age: 63
End: 2018-01-23
Payer: COMMERCIAL

## 2018-01-23 VITALS — WEIGHT: 285 LBS | BODY MASS INDEX: 37.6 KG/M2

## 2018-01-23 VITALS — DIASTOLIC BLOOD PRESSURE: 85 MMHG | OXYGEN SATURATION: 95 % | SYSTOLIC BLOOD PRESSURE: 150 MMHG | HEART RATE: 64 BPM

## 2018-01-23 DIAGNOSIS — Z95.5 PRESENCE OF CORONARY ANGIOPLASTY IMPLANT AND GRAFT: Chronic | ICD-10-CM

## 2018-01-23 DIAGNOSIS — R06.09 OTHER FORMS OF DYSPNEA: ICD-10-CM

## 2018-01-23 PROCEDURE — 99214 OFFICE O/P EST MOD 30 MIN: CPT | Mod: 25

## 2018-01-23 PROCEDURE — 71046 X-RAY EXAM CHEST 2 VIEWS: CPT | Mod: 26

## 2018-01-23 PROCEDURE — 71046 X-RAY EXAM CHEST 2 VIEWS: CPT

## 2018-01-23 PROCEDURE — 93000 ELECTROCARDIOGRAM COMPLETE: CPT

## 2018-01-25 ENCOUNTER — APPOINTMENT (OUTPATIENT)
Dept: CARDIOLOGY | Facility: CLINIC | Age: 63
End: 2018-01-25

## 2018-01-29 ENCOUNTER — MEDICATION RENEWAL (OUTPATIENT)
Age: 63
End: 2018-01-29

## 2018-02-05 ENCOUNTER — APPOINTMENT (OUTPATIENT)
Dept: CARDIOLOGY | Facility: CLINIC | Age: 63
End: 2018-02-05
Payer: COMMERCIAL

## 2018-02-05 VITALS
OXYGEN SATURATION: 96 % | HEIGHT: 73 IN | BODY MASS INDEX: 35.78 KG/M2 | WEIGHT: 270 LBS | SYSTOLIC BLOOD PRESSURE: 162 MMHG | DIASTOLIC BLOOD PRESSURE: 89 MMHG | HEART RATE: 84 BPM

## 2018-02-05 PROCEDURE — 99214 OFFICE O/P EST MOD 30 MIN: CPT

## 2018-03-02 ENCOUNTER — MEDICATION RENEWAL (OUTPATIENT)
Age: 63
End: 2018-03-02

## 2018-03-12 ENCOUNTER — MEDICATION RENEWAL (OUTPATIENT)
Age: 63
End: 2018-03-12

## 2018-03-12 ENCOUNTER — RX RENEWAL (OUTPATIENT)
Age: 63
End: 2018-03-12

## 2018-04-19 ENCOUNTER — RX RENEWAL (OUTPATIENT)
Age: 63
End: 2018-04-19

## 2018-04-29 ENCOUNTER — INPATIENT (INPATIENT)
Facility: HOSPITAL | Age: 63
LOS: 1 days | Discharge: HOME | DRG: 247 | End: 2018-05-01
Attending: INTERNAL MEDICINE | Admitting: STUDENT IN AN ORGANIZED HEALTH CARE EDUCATION/TRAINING PROGRAM
Payer: COMMERCIAL

## 2018-04-29 VITALS
SYSTOLIC BLOOD PRESSURE: 155 MMHG | RESPIRATION RATE: 18 BRPM | OXYGEN SATURATION: 96 % | HEART RATE: 106 BPM | DIASTOLIC BLOOD PRESSURE: 84 MMHG | WEIGHT: 285.06 LBS | TEMPERATURE: 98 F

## 2018-04-29 DIAGNOSIS — I20.0 UNSTABLE ANGINA: ICD-10-CM

## 2018-04-29 DIAGNOSIS — I25.10 ATHEROSCLEROTIC HEART DISEASE OF NATIVE CORONARY ARTERY WITHOUT ANGINA PECTORIS: ICD-10-CM

## 2018-04-29 DIAGNOSIS — R07.9 CHEST PAIN, UNSPECIFIED: ICD-10-CM

## 2018-04-29 DIAGNOSIS — Z29.9 ENCOUNTER FOR PROPHYLACTIC MEASURES, UNSPECIFIED: ICD-10-CM

## 2018-04-29 DIAGNOSIS — I10 ESSENTIAL (PRIMARY) HYPERTENSION: ICD-10-CM

## 2018-04-29 DIAGNOSIS — R11.2 NAUSEA WITH VOMITING, UNSPECIFIED: ICD-10-CM

## 2018-04-29 DIAGNOSIS — E11.9 TYPE 2 DIABETES MELLITUS WITHOUT COMPLICATIONS: ICD-10-CM

## 2018-04-29 DIAGNOSIS — E66.01 MORBID (SEVERE) OBESITY DUE TO EXCESS CALORIES: ICD-10-CM

## 2018-04-29 DIAGNOSIS — E83.39 OTHER DISORDERS OF PHOSPHORUS METABOLISM: ICD-10-CM

## 2018-04-29 DIAGNOSIS — Z95.5 PRESENCE OF CORONARY ANGIOPLASTY IMPLANT AND GRAFT: Chronic | ICD-10-CM

## 2018-04-29 DIAGNOSIS — E87.2 ACIDOSIS: ICD-10-CM

## 2018-04-29 DIAGNOSIS — R65.10 SYSTEMIC INFLAMMATORY RESPONSE SYNDROME (SIRS) OF NON-INFECTIOUS ORIGIN WITHOUT ACUTE ORGAN DYSFUNCTION: ICD-10-CM

## 2018-04-29 LAB
ALBUMIN SERPL ELPH-MCNC: 4.3 G/DL — SIGNIFICANT CHANGE UP (ref 3.3–5)
ALP SERPL-CCNC: 86 U/L — SIGNIFICANT CHANGE UP (ref 40–120)
ALT FLD-CCNC: 20 U/L — SIGNIFICANT CHANGE UP (ref 10–45)
ANION GAP SERPL CALC-SCNC: 16 MMOL/L — SIGNIFICANT CHANGE UP (ref 5–17)
ANION GAP SERPL CALC-SCNC: 16 MMOL/L — SIGNIFICANT CHANGE UP (ref 5–17)
APTT BLD: 39 SEC — HIGH (ref 27.5–37.4)
AST SERPL-CCNC: 17 U/L — SIGNIFICANT CHANGE UP (ref 10–40)
BASE EXCESS BLDV CALC-SCNC: 1.1 MMOL/L — SIGNIFICANT CHANGE UP (ref -2–2)
BASE EXCESS BLDV CALC-SCNC: 3 MMOL/L — HIGH (ref -2–2)
BASOPHILS # BLD AUTO: 0 K/UL — SIGNIFICANT CHANGE UP (ref 0–0.2)
BASOPHILS NFR BLD AUTO: 0.3 % — SIGNIFICANT CHANGE UP (ref 0–2)
BILIRUB SERPL-MCNC: 0.6 MG/DL — SIGNIFICANT CHANGE UP (ref 0.2–1.2)
BUN SERPL-MCNC: 20 MG/DL — SIGNIFICANT CHANGE UP (ref 7–23)
BUN SERPL-MCNC: 21 MG/DL — SIGNIFICANT CHANGE UP (ref 7–23)
CA-I SERPL-SCNC: 1.11 MMOL/L — LOW (ref 1.12–1.3)
CA-I SERPL-SCNC: 1.12 MMOL/L — SIGNIFICANT CHANGE UP (ref 1.12–1.3)
CALCIUM SERPL-MCNC: 8.6 MG/DL — SIGNIFICANT CHANGE UP (ref 8.4–10.5)
CALCIUM SERPL-MCNC: 9.6 MG/DL — SIGNIFICANT CHANGE UP (ref 8.4–10.5)
CHLORIDE BLDV-SCNC: 103 MMOL/L — SIGNIFICANT CHANGE UP (ref 96–108)
CHLORIDE BLDV-SCNC: 105 MMOL/L — SIGNIFICANT CHANGE UP (ref 96–108)
CHLORIDE SERPL-SCNC: 100 MMOL/L — SIGNIFICANT CHANGE UP (ref 96–108)
CHLORIDE SERPL-SCNC: 98 MMOL/L — SIGNIFICANT CHANGE UP (ref 96–108)
CK MB CFR SERPL CALC: 1.4 NG/ML — SIGNIFICANT CHANGE UP (ref 0–6.7)
CK SERPL-CCNC: 127 U/L — SIGNIFICANT CHANGE UP (ref 30–200)
CK SERPL-CCNC: 85 U/L — SIGNIFICANT CHANGE UP (ref 30–200)
CO2 BLDV-SCNC: 26 MMOL/L — SIGNIFICANT CHANGE UP (ref 22–30)
CO2 BLDV-SCNC: 27 MMOL/L — SIGNIFICANT CHANGE UP (ref 22–30)
CO2 SERPL-SCNC: 22 MMOL/L — SIGNIFICANT CHANGE UP (ref 22–31)
CO2 SERPL-SCNC: 24 MMOL/L — SIGNIFICANT CHANGE UP (ref 22–31)
CREAT SERPL-MCNC: 0.76 MG/DL — SIGNIFICANT CHANGE UP (ref 0.5–1.3)
CREAT SERPL-MCNC: 0.86 MG/DL — SIGNIFICANT CHANGE UP (ref 0.5–1.3)
EOSINOPHIL # BLD AUTO: 0 K/UL — SIGNIFICANT CHANGE UP (ref 0–0.5)
EOSINOPHIL NFR BLD AUTO: 0.2 % — SIGNIFICANT CHANGE UP (ref 0–6)
GAS PNL BLDV: 136 MMOL/L — SIGNIFICANT CHANGE UP (ref 136–145)
GAS PNL BLDV: 136 MMOL/L — SIGNIFICANT CHANGE UP (ref 136–145)
GAS PNL BLDV: SIGNIFICANT CHANGE UP
GLUCOSE BLDC GLUCOMTR-MCNC: 117 MG/DL — HIGH (ref 70–99)
GLUCOSE BLDV-MCNC: 172 MG/DL — HIGH (ref 70–99)
GLUCOSE BLDV-MCNC: 194 MG/DL — HIGH (ref 70–99)
GLUCOSE SERPL-MCNC: 104 MG/DL — HIGH (ref 70–99)
GLUCOSE SERPL-MCNC: 185 MG/DL — HIGH (ref 70–99)
HCO3 BLDV-SCNC: 25 MMOL/L — SIGNIFICANT CHANGE UP (ref 21–29)
HCO3 BLDV-SCNC: 26 MMOL/L — SIGNIFICANT CHANGE UP (ref 21–29)
HCT VFR BLD CALC: 44.8 % — SIGNIFICANT CHANGE UP (ref 39–50)
HCT VFR BLDA CALC: 45 % — SIGNIFICANT CHANGE UP (ref 39–50)
HCT VFR BLDA CALC: 45 % — SIGNIFICANT CHANGE UP (ref 39–50)
HGB BLD CALC-MCNC: 14.6 G/DL — SIGNIFICANT CHANGE UP (ref 13–17)
HGB BLD CALC-MCNC: 14.7 G/DL — SIGNIFICANT CHANGE UP (ref 13–17)
HGB BLD-MCNC: 15.5 G/DL — SIGNIFICANT CHANGE UP (ref 13–17)
INR BLD: 1.09 RATIO — SIGNIFICANT CHANGE UP (ref 0.88–1.16)
LACTATE BLDV-MCNC: 2.3 MMOL/L — HIGH (ref 0.7–2)
LACTATE BLDV-MCNC: 2.8 MMOL/L — HIGH (ref 0.7–2)
LACTATE BLDV-MCNC: 3.4 MMOL/L — HIGH (ref 0.7–2)
LIDOCAIN IGE QN: 34 U/L — SIGNIFICANT CHANGE UP (ref 7–60)
LYMPHOCYTES # BLD AUTO: 0.5 K/UL — LOW (ref 1–3.3)
LYMPHOCYTES # BLD AUTO: 3 % — LOW (ref 13–44)
MAGNESIUM SERPL-MCNC: 2 MG/DL — SIGNIFICANT CHANGE UP (ref 1.6–2.6)
MCHC RBC-ENTMCNC: 29.6 PG — SIGNIFICANT CHANGE UP (ref 27–34)
MCHC RBC-ENTMCNC: 34.7 GM/DL — SIGNIFICANT CHANGE UP (ref 32–36)
MCV RBC AUTO: 85.2 FL — SIGNIFICANT CHANGE UP (ref 80–100)
MONOCYTES # BLD AUTO: 0.6 K/UL — SIGNIFICANT CHANGE UP (ref 0–0.9)
MONOCYTES NFR BLD AUTO: 3.6 % — SIGNIFICANT CHANGE UP (ref 2–14)
NEUTROPHILS # BLD AUTO: 14.7 K/UL — HIGH (ref 1.8–7.4)
NEUTROPHILS NFR BLD AUTO: 93 % — HIGH (ref 43–77)
NT-PROBNP SERPL-SCNC: 87 PG/ML — SIGNIFICANT CHANGE UP (ref 0–300)
OTHER CELLS CSF MANUAL: 14 ML/DL — LOW (ref 18–22)
OTHER CELLS CSF MANUAL: 17 ML/DL — LOW (ref 18–22)
PCO2 BLDV: 33 MMHG — LOW (ref 35–50)
PCO2 BLDV: 44 MMHG — SIGNIFICANT CHANGE UP (ref 35–50)
PH BLDV: 7.39 — SIGNIFICANT CHANGE UP (ref 7.35–7.45)
PH BLDV: 7.5 — HIGH (ref 7.35–7.45)
PHOSPHATE SERPL-MCNC: 2.8 MG/DL — SIGNIFICANT CHANGE UP (ref 2.5–4.5)
PLATELET # BLD AUTO: 274 K/UL — SIGNIFICANT CHANGE UP (ref 150–400)
PO2 BLDV: 36 MMHG — SIGNIFICANT CHANGE UP (ref 25–45)
PO2 BLDV: 45 MMHG — SIGNIFICANT CHANGE UP (ref 25–45)
POTASSIUM BLDV-SCNC: 3.6 MMOL/L — SIGNIFICANT CHANGE UP (ref 3.5–5)
POTASSIUM BLDV-SCNC: 3.8 MMOL/L — SIGNIFICANT CHANGE UP (ref 3.5–5)
POTASSIUM SERPL-MCNC: 3.8 MMOL/L — SIGNIFICANT CHANGE UP (ref 3.5–5.3)
POTASSIUM SERPL-MCNC: 5.1 MMOL/L — SIGNIFICANT CHANGE UP (ref 3.5–5.3)
POTASSIUM SERPL-SCNC: 3.8 MMOL/L — SIGNIFICANT CHANGE UP (ref 3.5–5.3)
POTASSIUM SERPL-SCNC: 5.1 MMOL/L — SIGNIFICANT CHANGE UP (ref 3.5–5.3)
PROT SERPL-MCNC: 7.6 G/DL — SIGNIFICANT CHANGE UP (ref 6–8.3)
PROTHROM AB SERPL-ACNC: 11.8 SEC — SIGNIFICANT CHANGE UP (ref 9.8–12.7)
RBC # BLD: 5.26 M/UL — SIGNIFICANT CHANGE UP (ref 4.2–5.8)
RBC # FLD: 13.1 % — SIGNIFICANT CHANGE UP (ref 10.3–14.5)
SAO2 % BLDV: 68 % — SIGNIFICANT CHANGE UP (ref 67–88)
SAO2 % BLDV: 85 % — SIGNIFICANT CHANGE UP (ref 67–88)
SODIUM SERPL-SCNC: 138 MMOL/L — SIGNIFICANT CHANGE UP (ref 135–145)
SODIUM SERPL-SCNC: 138 MMOL/L — SIGNIFICANT CHANGE UP (ref 135–145)
TROPONIN T SERPL-MCNC: <0.01 NG/ML — SIGNIFICANT CHANGE UP (ref 0–0.06)
WBC # BLD: 15.8 K/UL — HIGH (ref 3.8–10.5)
WBC # FLD AUTO: 15.8 K/UL — HIGH (ref 3.8–10.5)

## 2018-04-29 PROCEDURE — 99223 1ST HOSP IP/OBS HIGH 75: CPT | Mod: GC

## 2018-04-29 PROCEDURE — 99285 EMERGENCY DEPT VISIT HI MDM: CPT | Mod: 25

## 2018-04-29 PROCEDURE — 93010 ELECTROCARDIOGRAM REPORT: CPT | Mod: NC

## 2018-04-29 PROCEDURE — 71046 X-RAY EXAM CHEST 2 VIEWS: CPT | Mod: 26

## 2018-04-29 RX ORDER — HEPARIN SODIUM 5000 [USP'U]/ML
INJECTION INTRAVENOUS; SUBCUTANEOUS
Qty: 25000 | Refills: 0 | Status: DISCONTINUED | OUTPATIENT
Start: 2018-04-29 | End: 2018-04-29

## 2018-04-29 RX ORDER — ASPIRIN/CALCIUM CARB/MAGNESIUM 324 MG
243 TABLET ORAL DAILY
Qty: 0 | Refills: 0 | Status: DISCONTINUED | OUTPATIENT
Start: 2018-04-29 | End: 2018-04-29

## 2018-04-29 RX ORDER — POTASSIUM CHLORIDE 20 MEQ
40 PACKET (EA) ORAL ONCE
Qty: 0 | Refills: 0 | Status: DISCONTINUED | OUTPATIENT
Start: 2018-04-29 | End: 2018-04-29

## 2018-04-29 RX ORDER — HEPARIN SODIUM 5000 [USP'U]/ML
6000 INJECTION INTRAVENOUS; SUBCUTANEOUS EVERY 6 HOURS
Qty: 0 | Refills: 0 | Status: DISCONTINUED | OUTPATIENT
Start: 2018-04-29 | End: 2018-04-29

## 2018-04-29 RX ORDER — INSULIN LISPRO 100/ML
VIAL (ML) SUBCUTANEOUS
Qty: 0 | Refills: 0 | Status: DISCONTINUED | OUTPATIENT
Start: 2018-04-29 | End: 2018-05-01

## 2018-04-29 RX ORDER — SODIUM CHLORIDE 9 MG/ML
1000 INJECTION INTRAMUSCULAR; INTRAVENOUS; SUBCUTANEOUS ONCE
Qty: 0 | Refills: 0 | Status: COMPLETED | OUTPATIENT
Start: 2018-04-29 | End: 2018-04-29

## 2018-04-29 RX ORDER — METOPROLOL TARTRATE 50 MG
25 TABLET ORAL
Qty: 0 | Refills: 0 | Status: DISCONTINUED | OUTPATIENT
Start: 2018-04-29 | End: 2018-05-01

## 2018-04-29 RX ORDER — HEPARIN SODIUM 5000 [USP'U]/ML
5000 INJECTION INTRAVENOUS; SUBCUTANEOUS EVERY 8 HOURS
Qty: 0 | Refills: 0 | Status: DISCONTINUED | OUTPATIENT
Start: 2018-04-29 | End: 2018-04-29

## 2018-04-29 RX ORDER — ASPIRIN/CALCIUM CARB/MAGNESIUM 324 MG
4 TABLET ORAL
Qty: 0 | Refills: 0 | COMMUNITY

## 2018-04-29 RX ORDER — HEPARIN SODIUM 5000 [USP'U]/ML
5000 INJECTION INTRAVENOUS; SUBCUTANEOUS ONCE
Qty: 0 | Refills: 0 | Status: DISCONTINUED | OUTPATIENT
Start: 2018-04-29 | End: 2018-04-29

## 2018-04-29 RX ORDER — POTASSIUM PHOSPHATE, MONOBASIC POTASSIUM PHOSPHATE, DIBASIC 236; 224 MG/ML; MG/ML
15 INJECTION, SOLUTION INTRAVENOUS ONCE
Qty: 0 | Refills: 0 | Status: DISCONTINUED | OUTPATIENT
Start: 2018-04-29 | End: 2018-04-29

## 2018-04-29 RX ORDER — OMEGA-3 ACID ETHYL ESTERS 1 G
1 CAPSULE ORAL
Qty: 0 | Refills: 0 | COMMUNITY

## 2018-04-29 RX ORDER — SODIUM CHLORIDE 9 MG/ML
1000 INJECTION, SOLUTION INTRAVENOUS
Qty: 0 | Refills: 0 | Status: DISCONTINUED | OUTPATIENT
Start: 2018-04-29 | End: 2018-05-01

## 2018-04-29 RX ORDER — ATORVASTATIN CALCIUM 80 MG/1
80 TABLET, FILM COATED ORAL ONCE
Qty: 0 | Refills: 0 | Status: COMPLETED | OUTPATIENT
Start: 2018-04-29 | End: 2018-04-29

## 2018-04-29 RX ORDER — ATORVASTATIN CALCIUM 80 MG/1
80 TABLET, FILM COATED ORAL AT BEDTIME
Qty: 0 | Refills: 0 | Status: DISCONTINUED | OUTPATIENT
Start: 2018-04-30 | End: 2018-05-01

## 2018-04-29 RX ORDER — ONDANSETRON 8 MG/1
4 TABLET, FILM COATED ORAL ONCE
Qty: 0 | Refills: 0 | Status: COMPLETED | OUTPATIENT
Start: 2018-04-29 | End: 2018-04-29

## 2018-04-29 RX ORDER — LOSARTAN POTASSIUM 100 MG/1
100 TABLET, FILM COATED ORAL DAILY
Qty: 0 | Refills: 0 | Status: DISCONTINUED | OUTPATIENT
Start: 2018-04-29 | End: 2018-05-01

## 2018-04-29 RX ORDER — AMLODIPINE BESYLATE 2.5 MG/1
2.5 TABLET ORAL DAILY
Qty: 0 | Refills: 0 | Status: DISCONTINUED | OUTPATIENT
Start: 2018-04-29 | End: 2018-05-01

## 2018-04-29 RX ORDER — INSULIN LISPRO 100/ML
VIAL (ML) SUBCUTANEOUS AT BEDTIME
Qty: 0 | Refills: 0 | Status: DISCONTINUED | OUTPATIENT
Start: 2018-04-29 | End: 2018-05-01

## 2018-04-29 RX ORDER — DEXTROSE 50 % IN WATER 50 %
25 SYRINGE (ML) INTRAVENOUS ONCE
Qty: 0 | Refills: 0 | Status: DISCONTINUED | OUTPATIENT
Start: 2018-04-29 | End: 2018-05-01

## 2018-04-29 RX ORDER — ACETAMINOPHEN 500 MG
650 TABLET ORAL ONCE
Qty: 0 | Refills: 0 | Status: COMPLETED | OUTPATIENT
Start: 2018-04-29 | End: 2018-04-29

## 2018-04-29 RX ORDER — DEXTROSE 50 % IN WATER 50 %
12.5 SYRINGE (ML) INTRAVENOUS ONCE
Qty: 0 | Refills: 0 | Status: DISCONTINUED | OUTPATIENT
Start: 2018-04-29 | End: 2018-05-01

## 2018-04-29 RX ORDER — ASPIRIN/CALCIUM CARB/MAGNESIUM 324 MG
81 TABLET ORAL DAILY
Qty: 0 | Refills: 0 | Status: DISCONTINUED | OUTPATIENT
Start: 2018-04-30 | End: 2018-05-01

## 2018-04-29 RX ORDER — GLUCAGON INJECTION, SOLUTION 0.5 MG/.1ML
1 INJECTION, SOLUTION SUBCUTANEOUS ONCE
Qty: 0 | Refills: 0 | Status: DISCONTINUED | OUTPATIENT
Start: 2018-04-29 | End: 2018-05-01

## 2018-04-29 RX ORDER — DEXTROSE 50 % IN WATER 50 %
1 SYRINGE (ML) INTRAVENOUS ONCE
Qty: 0 | Refills: 0 | Status: DISCONTINUED | OUTPATIENT
Start: 2018-04-29 | End: 2018-05-01

## 2018-04-29 RX ORDER — SIMVASTATIN 20 MG/1
40 TABLET, FILM COATED ORAL AT BEDTIME
Qty: 0 | Refills: 0 | Status: DISCONTINUED | OUTPATIENT
Start: 2018-04-29 | End: 2018-04-29

## 2018-04-29 RX ORDER — OMEGA-3 ACID ETHYL ESTERS 1 G
4 CAPSULE ORAL DAILY
Qty: 0 | Refills: 0 | Status: DISCONTINUED | OUTPATIENT
Start: 2018-04-29 | End: 2018-05-01

## 2018-04-29 RX ORDER — SODIUM CHLORIDE 9 MG/ML
3 INJECTION INTRAMUSCULAR; INTRAVENOUS; SUBCUTANEOUS ONCE
Qty: 0 | Refills: 0 | Status: COMPLETED | OUTPATIENT
Start: 2018-04-29 | End: 2018-04-29

## 2018-04-29 RX ORDER — CLOPIDOGREL BISULFATE 75 MG/1
75 TABLET, FILM COATED ORAL DAILY
Qty: 0 | Refills: 0 | Status: DISCONTINUED | OUTPATIENT
Start: 2018-04-30 | End: 2018-05-01

## 2018-04-29 RX ADMIN — ONDANSETRON 4 MILLIGRAM(S): 8 TABLET, FILM COATED ORAL at 20:21

## 2018-04-29 RX ADMIN — Medication 40 MILLIEQUIVALENT(S): at 12:30

## 2018-04-29 RX ADMIN — Medication 243 MILLIGRAM(S): at 09:03

## 2018-04-29 RX ADMIN — ATORVASTATIN CALCIUM 80 MILLIGRAM(S): 80 TABLET, FILM COATED ORAL at 15:46

## 2018-04-29 RX ADMIN — ONDANSETRON 4 MILLIGRAM(S): 8 TABLET, FILM COATED ORAL at 09:22

## 2018-04-29 RX ADMIN — SODIUM CHLORIDE 3 MILLILITER(S): 9 INJECTION INTRAMUSCULAR; INTRAVENOUS; SUBCUTANEOUS at 09:03

## 2018-04-29 RX ADMIN — Medication 25 MILLIGRAM(S): at 18:41

## 2018-04-29 RX ADMIN — SODIUM CHLORIDE 500 MILLILITER(S): 9 INJECTION INTRAMUSCULAR; INTRAVENOUS; SUBCUTANEOUS at 12:00

## 2018-04-29 RX ADMIN — SODIUM CHLORIDE 1000 MILLILITER(S): 9 INJECTION INTRAMUSCULAR; INTRAVENOUS; SUBCUTANEOUS at 09:03

## 2018-04-29 RX ADMIN — Medication 650 MILLIGRAM(S): at 15:44

## 2018-04-29 NOTE — CHART NOTE - NSCHARTNOTEFT_GEN_A_CORE
Medicine Chart Update-PGY2    Called outptient cardiologist Dr. Huynh and reached answering service who indicated that they would like the faculty cardiology team to evaluate the patient. On-call service unable to provide recent history of non-invasive cardiac evaluation. Called consult w/ cardiology fellow and indicated concern for unstable angina given sleep awakening n/v, sob and chest heaviness and advised to start heparin gtt in setting of suspected unstable angina, trend cardiac enzyme q6hr, tele monitoring, and order TTE. Called RN and pending official weight for patient prior to starting heparin gtt.      Thiago Melendez MD PGY-2  Medicine Day Admit Resident  spectra [phone] Medicine Chart Update-PGY2    Called outptient cardiologist Dr. Huynh and reached answering service who indicated that they would like the faculty cardiology team to evaluate the patient. On-call service unable to provide recent history of non-invasive cardiac evaluation. Called consult w/ cardiology fellow and indicated concern for unstable angina given sleep awakening n/v, sob and chest heaviness and advised to start heparin gtt in setting of suspected unstable angina, trend cardiac enzyme q6hr, tele monitoring, and order TTE. Called RN and pending official weight for patient prior to starting heparin gtt.      Thiago Melendez MD PGY-2  Medicine Day Admit Resident  [address]ra [phone]    Addendum: spoke w/ RN. Will get weight now to dose heparin gtt. start high dose lipitor. noted to received K tab 40mEq therefore d/c IV K repletion pending bmp now

## 2018-04-29 NOTE — H&P ADULT - HISTORY OF PRESENT ILLNESS
62M w/ CAD w/ hx of MI s/p DESx17 (last 2015 to LAD), Former Smoker (40 pack year, quit 2009), DM2 on metformin, HTN, morbid obesity, and gout presents to [facility] for hours of N/V, sob and chest heaviness. Patient reports that he was awoken in the middle of the night around 3am w/ nausea and had multiple episodes of NBNB emesis occurring every hour w/ proceeding epigastric discomfort (described as "oh boy here we go" quality and not sharp/pressure/burning, of medium severity and alleviated w/ emesis). The patient initially denied chest pain however nursing indicated that he had "chest heaviness" for which patient upon reminding said was occurring over central chest and radiating to central neck of medium quality. The chest heaviness was not similar in character to previous times when he required stents, reporting that during those times he had "all the bells and whistles with feeling like something was sitting on his chest." W/ regard to sob he indicates he had it when "getting worked up" during episodes of vomiting. Also on review the patient reports that there have been a couple of episodes of loose stool since awakening and did eat out yesterday eating chicken dish at a restaurant. No report of fevers, chills, palpitations, inability to lie flat, sob when lying flat, hx of heart failure, hx of medication non-adherence, abdominal distension, lower extremity swelling, or rash. Also he has family hx significant for death of father at age 39 from MI.    In the ED, the patient presents w/ VS 97.9F, 155/84, 106, 18 96% on RA. He was given  chewable (taken home doses of DAPT), zofran 4mg IV and 1L NS.

## 2018-04-29 NOTE — H&P ADULT - ASSESSMENT
62M w/ CAD w/ hx of MI s/p DESx17 (last 2015 to LAD), Former Smoker (40 pack year, quit 2009), DM2 on metformin, HTN, morbid obesity, and gout presents to [facility] for hours of N/V, sob and chest heaviness and admitted to medicine for evaluation for N/V w/ chest discomfort concerning for unstable angina equivalent vs gastroenteritis.

## 2018-04-29 NOTE — H&P ADULT - PROBLEM SELECTOR PLAN 4
SIRS criteria met w/ tachycardia and leukocytosis.  - nontoxic, normotensive and w/o respiratory distress on admit. Will monitor off abx at this time. If febrile, likely in setting of gastroenteritis and if so most likely viral and would treat w/ supportive care w/ consideration for abx following bedside assessment

## 2018-04-29 NOTE — ED PROVIDER NOTE - OBJECTIVE STATEMENT
Attending Soler: 60 yo male with h/o CAD, DM presenting with chest pain and vomiting. pt states started vomiting last night. Pt vomited multiple times last night. no hematemesis. pt states in addition to vomiting developed sob. this am did develop some chest pain. pain located in the middle of the chest. chest pain and sob unchanged with movement. pain located in the middle of the chest.  pain worse with taking deep breaths. pain described as heaviness. pain located in the middle of thec hest and radiates to the throat. pain similar to when had cardiac events. sob unchanged with laying flat. diarrhea last night as well. had approximately 3 episodes of diarrhea. no recent uri. no black or bloody stools. pt also reports a mild frontal headache since symptoms started. chest pain did begin at approxiamtely 3am and intermittent. pt has h/o CAD with 17 stents    Cards: dr khoury

## 2018-04-29 NOTE — H&P ADULT - PROBLEM SELECTOR PLAN 3
CAD s/p STENT  - c/w DAPT. at this time does not meet NSTEMI or STEMI criteria. currently unstable angina vs gastroenteritis for presenting complaint. as above. Cardiac consult

## 2018-04-29 NOTE — ED PROVIDER NOTE - PHYSICAL EXAMINATION
Gen: NAD, AOx3  Head: NCAT  Lung: CTAB, no respiratory distress, no wheezing, rales, rhonchi  CV: normal s1/s2, tachycardic, no murmurs, Normal perfusion, pulses 2+ throughout  Abd: soft, NTND  MSK: No edema, no visible deformities, full range of motion in all 4 extremities  Neuro: CN II-XII grossly intact, No focal neurologic deficits  Skin: No rash   Psych: normal affect

## 2018-04-29 NOTE — ED ADULT NURSE NOTE - OBJECTIVE STATEMENT
62 year old male presents to ED through waiting room with wife complaining of abdominal and chest discomfort with nausea vomiting since 4am. History of DM, Gout, HTN, HLD, BPH, CAD s/p 17 stents. 62 year old male presents to ED through waiting room with wife complaining of abdominal and chest discomfort with nausea vomiting since 4am. History of DM, Gout, HTN, HLD, BPH, CAD s/p 17 stents. States he woke up not feeling well, several episodes of vomiting, has had intermittent chest pain described as a heaviness since this morning in middle of chest radiating up neck associated with SOB. Denies HA, fevers, chills, recent illness, sick contacts or travel. Patient undressed and placed into gown, call bell in hand and side rails up with bed in lowest position for safety. blanket provided. Comfort and safety provided.

## 2018-04-29 NOTE — H&P ADULT - NSHPPHYSICALEXAM_GEN_ALL_CORE
Vital Signs Last 24 Hrs  T(C): 36.6 (29 Apr 2018 08:18), Max: 36.6 (29 Apr 2018 08:18)  T(F): 97.9 (29 Apr 2018 08:18), Max: 97.9 (29 Apr 2018 08:18)  HR: 94 (29 Apr 2018 09:00) (94 - 106)  BP: 114/97 (29 Apr 2018 09:00) (114/97 - 155/84)  BP(mean): --  RR: 18 (29 Apr 2018 09:00) (18 - 18)  SpO2: 98% (29 Apr 2018 09:00) (96% - 98%)    GENERAL: NAD, morbid obesity  HEAD:  Atraumatic, Normocephalic  EYES: EOMI, PERRLA, conjunctiva and sclera clear  Mouth: MMM, no lesions  NECK: Supple, no appreciable masses, no JVD appreciated  Lung: normal work of breathing, cta b/l  Chest: S1&S2+, rrr, no m/r/g appreciated  ABDOMEN: bs+, soft, nt, nd, no appreciable masses  : No bryant catheter, no CVA tenderness  EXTREMITIES:  radial pulse present b/l, PT present b/l, mild pitting edema just above ankle b/l  Neuro: A&Ox3, no focal deficits  SKIN: warm and dry, no visible rashes

## 2018-04-29 NOTE — ED PROVIDER NOTE - CARE PLAN
Principal Discharge DX:	Chest pain  Secondary Diagnosis:	Shortness of breath  Secondary Diagnosis:	Vomiting

## 2018-04-29 NOTE — H&P ADULT - PROBLEM SELECTOR PLAN 5
lactic acidosis on presentation. unclear of source, possibly from work of recurrent emesis w/ hx of metformin use.   - monitor lactate with continued fluid resuscitation.

## 2018-04-29 NOTE — CONSULT NOTE ADULT - ASSESSMENT
A/P:  62M w/ CAD w/ hx of MI s/p PCI to LAD in 2015,, former Smoker (40 pack year, quit 2009), DM2 on metformin, HTN, morbid obesity, and gout who presents with vomiting episodes accompanied with chest pain and shortness of breath. A/P:  62M w/ CAD w/ hx of MI s/p PCI to LAD in 2015,, former Smoker (40 pack year, quit 2009), DM2 on metformin, HTN, morbid obesity, and gout who presents with vomiting episodes accompanied with chest pain and shortness of breath. I dont suspect acute plaque rupture leading to his chest pain, but rather a GI stressor that may have caused demand ischemia precipitating episode of chest pain.     Plan:     # CAD   - continue to obtain 2 sets of CE   - obtain TTE in AM  - NST per [employer] show medium sized, mild to moderate defects in basal inferior , mid inferior walls that are fixed suggestive of infarct ( in Oct 2017)   - continue DAPT  - continue statin   - continue beta blockers   - if recurring chest pain, repeat EKG and call cardiology   - would hold off on Heparin gtt at this time, as my suspicion for ACS event is low.   - monitor on telemetry.     Eliazar Tadeo MD  Cardiology Fellow  x [phone] 62M w/ CAD w/ hx of MI s/p BULL mid RCA, Lt Cx (2010) mid LAD (last 2015 to LAD), Former Smoker (40 pack year, quit 2009), DM2 on metformin, HTN, morbid obesity, and gout presents to [facility] for hours of N/V, sob and chest heaviness.

## 2018-04-29 NOTE — ED PROVIDER NOTE - MEDICAL DECISION MAKING DETAILS
61yo male PMH CAD s/p stents (last stent 2015) presenting with pressure like chest pain, shortness of breath, and nausea/vomiting since last night, EKG sinus tachycardia, concerning for acute coronary syndrome vs gastro, will obtain labs, ASA, cxr, reassess. Maria G Robertson DO 61yo male PMH CAD s/p stents (last stent 2015) presenting with pressure like chest pain, shortness of breath, and nausea/vomiting since last night, EKG sinus tachycardia, concerning for acute coronary syndrome vs gastro, will obtain labs, ASA, cxr, reassess. Maria G York: 63 y/o male h/o extensive cardiac disease with multiple stents presenting with n/v/d as well as chest pain. upon arrival pt reporting epigastric discomfort. serial ekg's unchanged. unclear whether symptoms suggestive of acute coronary process, re-stenosis of stents or gastritis secondary to multiple episodes of vomiting. less likely booerhave syndrome. will obtain labs, place on cardiac monitor, send cardiac enzymes and likely admit.

## 2018-04-29 NOTE — ED ADULT NURSE NOTE - MUSCULOSKELETAL WDL
Diagnosis:Sciatica of left side (M54.32)  Authorized # of Visits:  3/8 (O)         Next MD visit: none scheduled  Fall Risk: standard         Precautions: n/a           Medication Changes since last visit?: No  Subjective: Pt states that she has increase Full range of motion of upper and lower extremities, no joint tenderness/swelling.

## 2018-04-29 NOTE — H&P ADULT - PROBLEM SELECTOR PLAN 9
HSQ for dvt ppx Noted morbid obesity. will need counseling for weight reduction prior to discharge.  - additionally would benefit from outpatient sleep study given BMI and hx of snoring

## 2018-04-29 NOTE — CONSULT NOTE ADULT - SUBJECTIVE AND OBJECTIVE BOX
Patient seen and evaluated at bedside    Chief Complaint: vomiting, SOB     HPI:  62M w/ CAD w/ hx of MI s/p DESx17 (last 2015 to LAD), Former Smoker (40 pack year, quit 2009), DM2 on metformin, HTN, morbid obesity, and gout presents to [facility] for hours of N/V, sob and chest heaviness. Patient reports that he was awoken in the middle of the night around 3am w/ nausea and had multiple episodes of NBNB emesis occurring every hour w/ proceeding epigastric discomfort. the patient initially denied chest pain however nursing indicated that he had "chest heaviness" for which patient upon reminding said was occurring over central chest and radiating to central neck of medium quality. The chest heaviness was not similar in character to previous times when he required stents. W/ regard to sob he indicates he had it when "getting worked up" during episodes of vomiting. Also on review the patient reports that there have been a couple of episodes of loose stool since awakening and did eat out yesterday eating chicken dish at a restaurant. No report of fevers, chills, palpitations, inability to lie flat, sob when lying flat, hx of heart failure, hx of medication non-adherence, abdominal distension, lower extremity swelling, or rash. Also he has family hx significant for death of father at age 39 from MI.    In the ED, the patient presents w/ VS 97.9F, 155/84, 106, 18 96% on RA. He was given  chewable (taken home doses of DAPT), zofran 4mg IV and 1L NS. (29 Apr 2018 12:47)      PMH:   Diabetes  Gout  Hypertension  Morbid obesity  Dyslipidemia  BPH (benign prostatic hyperplasia)  CAD (coronary artery disease)      PSH:   S/P coronary artery stent placement  History of coronary artery stent placement      Medications:   amLODIPine   Tablet 2.5 milliGRAM(s) Oral daily  dextrose 5%. 1000 milliLiter(s) IV Continuous <Continuous>  dextrose 50% Injectable 12.5 Gram(s) IV Push once  dextrose 50% Injectable 25 Gram(s) IV Push once  dextrose 50% Injectable 25 Gram(s) IV Push once  dextrose Gel 1 Dose(s) Oral once PRN  glucagon  Injectable 1 milliGRAM(s) IntraMuscular once PRN  insulin lispro (HumaLOG) corrective regimen sliding scale   SubCutaneous three times a day before meals  insulin lispro (HumaLOG) corrective regimen sliding scale   SubCutaneous at bedtime  losartan 100 milliGRAM(s) Oral daily  metoprolol tartrate 25 milliGRAM(s) Oral two times a day  omega-3-Acid Ethyl Esters 4 Gram(s) Oral daily      Allergies:  Levaquin (Joint Pain)      FAMILY HISTORY:  Family history of acute myocardial infarction (Father)        Review of Systems:  REVIEW OF SYSTEMS:    CONSTITUTIONAL: No weakness, fevers or chills  EYES/ENT: No visual changes;  No dysphagia  NECK: No pain or stiffness  RESPIRATORY: No cough, wheezing, hemoptysis; No shortness of breath  CARDIOVASCULAR: see HPI   GASTROINTESTINAL: see HPI   BACK: No back pain  GENITOURINARY: No dysuria, frequency or hematuria  NEUROLOGICAL: No numbness or weakness  SKIN: No itching, burning, rashes, or lesions   All other review of systems is negative unless indicated above.    Physical Exam:  T(F): 98.3 (04-29), Max: 98.3 (04-29)  HR: 86 (04-29) (86 - 106)  BP: 139/72 (04-29) (114/97 - 155/84)  RR: 18 (04-29)  SpO2: 98% (04-29)    GENERAL: No acute distress, well-developed  HEAD:  Atraumatic, Normocephalic  ENT: EOMI, PERRLA, conjunctiva and sclera clear, Neck supple, No JVD, moist mucosa  CHEST/LUNG: Clear to auscultation bilaterally; No wheeze, equal breath sounds bilaterally   BACK: No spinal tenderness  HEART: Regular rate and rhythm; No murmurs, rubs, or gallops  ABDOMEN: Soft, Nontender, Nondistended; Bowel sounds present  EXTREMITIES:  No clubbing, cyanosis, or edema  PSYCH: Nl behavior, nl affect  NEUROLOGY: AAOx3, non-focal, cranial nerves intact  SKIN: Normal color, No rashes or lesions  LINES:    Cardiovascular Diagnostic Testing:    ECG: Personally reviewed  NSR , q waves inferiorly, early R wave progression     Echo:  none     Cath:  < from: Cardiac Cath Lab - Adult (04.17.15 @ 15:37) >  CORONARY VESSELS:  LAD:   --  Mid LAD: There was a tubular 75 % stenosis.  --  D1: There was a discrete 85 % stenosis. Was 60% but worsened by  shifting plaque from LAD intervention.  CX:   --  Proximal circumflex: There was a 0 % stenosis at the site of a  prior stent.  COMPLICATIONS: There were no complications.  INTERVENTIONAL IMPRESSIONS: Succesful PCI of LAD/D1  INTERVENTIONAL RECOMMENDATIONS: Manage with medical therapy.    < end of copied text >      Labs: Personally reviewed                        15.5   15.8  )-----------( 274      ( 29 Apr 2018 08:52 )             44.8     04-29    138  |  98  |  21  ----------------------------<  185<H>  3.8   |  24  |  0.86    Ca    9.6      29 Apr 2018 08:52  Phos  2.0     04-29  Mg     2.0     04-29    TPro  7.6  /  Alb  4.3  /  TBili  0.6  /  DBili  x   /  AST  17  /  ALT  20  /  AlkPhos  86  04-29    PT/INR - ( 29 Apr 2018 08:52 )   PT: 11.8 sec;   INR: 1.09 ratio         PTT - ( 29 Apr 2018 08:52 )  PTT:39.0 sec  CARDIAC MARKERS ( 29 Apr 2018 08:52 )  x     / <0.01 ng/mL / 109 U/L / x     / 2.3 ng/mL      Serum Pro-Brain Natriuretic Peptide: 87 pg/mL (04-29 @ 08:52) Patient seen and evaluated at bedside    Chief Complaint: vomiting, SOB     HPI:  62M w/ CAD w/ hx of MI s/p BULL mid RCA, Lt Cx (2010) mid LAD (last 2015 to LAD), Former Smoker (40 pack year, quit 2009), DM2 on metformin, HTN, morbid obesity, and gout presents to [facility] for hours of N/V, sob and chest heaviness. Patient reports that he was awoken in the middle of the night around 3am w/ nausea and had multiple episodes of NBNB emesis occurring every hour w/ proceeding epigastric discomfort. the patient initially denied chest pain however nursing indicated that he had "chest heaviness" for which patient upon reminding said was occurring over central chest and radiating to central neck of medium quality. The chest heaviness was similar in character to previous times when he required stents but not as severe in intensity. W/ regard to sob he indicates he had it when "getting worked up" during episodes of vomiting. Also on review the patient reports that there have been a couple of episodes of loose stool since awakening and did eat out yesterday eating chicken dish at a restaurant. No report of fevers, chills, palpitations, inability to lie flat, PND, LOC, palpitation, dizziness. No decrease in exercise tolerance. EKG repeated with posterior leads, no ST-T wave changes.   In the ED, the patient presents w/ VS 97.9F, 155/84, 106, 18 96% on RA. He was given  chewable (taken home doses of DAPT), zofran 4mg IV and 1L NS. (29 Apr 2018 12:47)      PMH:   Diabetes  Gout  Hypertension  Morbid obesity  Dyslipidemia  BPH (benign prostatic hyperplasia)  CAD (coronary artery disease)      PSH:   S/P coronary artery stent placement  History of coronary artery stent placement      Medications:   amLODIPine   Tablet 2.5 milliGRAM(s) Oral daily  dextrose 5%. 1000 milliLiter(s) IV Continuous <Continuous>  dextrose 50% Injectable 12.5 Gram(s) IV Push once  dextrose 50% Injectable 25 Gram(s) IV Push once  dextrose 50% Injectable 25 Gram(s) IV Push once  dextrose Gel 1 Dose(s) Oral once PRN  glucagon  Injectable 1 milliGRAM(s) IntraMuscular once PRN  insulin lispro (HumaLOG) corrective regimen sliding scale   SubCutaneous three times a day before meals  insulin lispro (HumaLOG) corrective regimen sliding scale   SubCutaneous at bedtime  losartan 100 milliGRAM(s) Oral daily  metoprolol tartrate 25 milliGRAM(s) Oral two times a day  omega-3-Acid Ethyl Esters 4 Gram(s) Oral daily      Allergies:  Levaquin (Joint Pain)      FAMILY HISTORY:  Family history of acute myocardial infarction (Father)        Review of Systems:  REVIEW OF SYSTEMS:    CONSTITUTIONAL: No weakness, fevers or chills  EYES/ENT: No visual changes;  No dysphagia  NECK: No pain or stiffness  RESPIRATORY: No cough, wheezing, hemoptysis; No shortness of breath  CARDIOVASCULAR: +chest pressure radiating to neck   GASTROINTESTINAL: +nausea/vomiting  BACK: No back pain  GENITOURINARY: No dysuria, frequency or hematuria  NEUROLOGICAL: No numbness or weakness  SKIN: No itching, burning, rashes, or lesions   All other review of systems is negative unless indicated above.    Physical Exam:  T(F): 98.3 (04-29), Max: 98.3 (04-29)  HR: 86 (04-29) (86 - 106)  BP: 139/72 (04-29) (114/97 - 155/84)  RR: 18 (04-29)  SpO2: 98% (04-29)    GENERAL: No acute distress, well-developed  HEAD:  Atraumatic, Normocephalic  ENT: EOMI, PERRLA, conjunctiva and sclera clear, Neck supple, No JVD, moist mucosa  CHEST/LUNG: Clear to auscultation bilaterally; No wheeze, equal breath sounds bilaterally   BACK: No spinal tenderness  HEART: Regular rate and rhythm; No murmurs, rubs, or gallops  ABDOMEN: Soft, Nontender, Nondistended; Bowel sounds present  EXTREMITIES:  No clubbing, cyanosis, or edema  PSYCH: Nl behavior, nl affect  NEUROLOGY: AAOx3, non-focal, cranial nerves intact  SKIN: Normal color, No rashes or lesions  LINES:    Cardiovascular Diagnostic Testing:    ECG: Personally reviewed  NSR , q waves inferiorly, early R wave progression     Echo:  none     Cath:  < from: Cardiac Cath Lab - Adult (04.17.15 @ 15:37) >  CORONARY VESSELS:  LAD:   --  Mid LAD: There was a tubular 75 % stenosis.  --  D1: There was a discrete 85 % stenosis. Was 60% but worsened by  shifting plaque from LAD intervention.  CX:   --  Proximal circumflex: There was a 0 % stenosis at the site of a  prior stent.  COMPLICATIONS: There were no complications.  INTERVENTIONAL IMPRESSIONS: Succesful PCI of LAD/D1  INTERVENTIONAL RECOMMENDATIONS: Manage with medical therapy.    < end of copied text >      Labs: Personally reviewed                        15.5   15.8  )-----------( 274      ( 29 Apr 2018 08:52 )             44.8     04-29    138  |  98  |  21  ----------------------------<  185<H>  3.8   |  24  |  0.86    Ca    9.6      29 Apr 2018 08:52  Phos  2.0     04-29  Mg     2.0     04-29    TPro  7.6  /  Alb  4.3  /  TBili  0.6  /  DBili  x   /  AST  17  /  ALT  20  /  AlkPhos  86  04-29    PT/INR - ( 29 Apr 2018 08:52 )   PT: 11.8 sec;   INR: 1.09 ratio         PTT - ( 29 Apr 2018 08:52 )  PTT:39.0 sec  CARDIAC MARKERS ( 29 Apr 2018 08:52 )  x     / <0.01 ng/mL / 109 U/L / x     / 2.3 ng/mL      Serum Pro-Brain Natriuretic Peptide: 87 pg/mL (04-29 @ 08:52)

## 2018-04-29 NOTE — H&P ADULT - NSHPLABSRESULTS_GEN_ALL_CORE
Personally reviewed available labs, imaging and ekg  Labs  CBC Full  -  ( 29 Apr 2018 08:52 )  WBC Count : 15.8 K/uL  Hemoglobin : 15.5 g/dL  Hematocrit : 44.8 %  Platelet Count - Automated : 274 K/uL  Mean Cell Volume : 85.2 fl  Mean Cell Hemoglobin : 29.6 pg  Mean Cell Hemoglobin Concentration : 34.7 gm/dL  Auto Neutrophil # : 14.7 K/uL  Auto Lymphocyte # : 0.5 K/uL  Auto Monocyte # : 0.6 K/uL  Auto Eosinophil # : 0.0 K/uL  Auto Basophil # : 0.0 K/uL  Auto Neutrophil % : 93.0 %  Auto Lymphocyte % : 3.0 %  Auto Monocyte % : 3.6 %  Auto Eosinophil % : 0.2 %  Auto Basophil % : 0.3 %    04-29    138  |  98  |  21  ----------------------------<  185<H>  3.8   |  24  |  0.86    Ca    9.6      29 Apr 2018 08:52  Phos  2.0     04-29  Mg     2.0     04-29  TPro  7.6  /  Alb  4.3  /  TBili  0.6  /  DBili  x   /  AST  17  /  ALT  20  /  AlkPhos  86  04-29  PT/INR - ( 29 Apr 2018 08:52 )   PT: 11.8 sec;   INR: 1.09 ratio    PTT - ( 29 Apr 2018 08:52 )  PTT:39.0 sec  CARDIAC MARKERS ( 29 Apr 2018 08:52 )  x     / <0.01 ng/mL / 109 U/L / x     / 2.3 ng/mL    11:58 - VBG - pH: 7.39  | pCO2: 44    | pO2: 36    | Lactate: 2.3    09:18 - VBG - pH: 7.50  | pCO2: 33    | pO2: 45    | Lactate: 2.8    Imaging  < from: Xray Chest 2 Views PA/Lat (04.29.18 @ 09:36) >  FINDINGS:   The heart is normal in size. There are no focal pulmonary consolidations.   There is no pneumothorax. There is no significant pleural effusion. There   are degenerative changes of the visualized spine.  IMPRESSION:   No focal consolidations.  < end of copied text >    EKG:  w. q in II, III and aVF w/o ABDIAZIZ or TWI appreciated. grossly unchanged from EKG from June2017. QTc 462 Personally reviewed available labs, imaging and ekg  Labs  CBC Full  -  ( 29 Apr 2018 08:52 )  WBC Count : 15.8 K/uL  Hemoglobin : 15.5 g/dL  Hematocrit : 44.8 %  Platelet Count - Automated : 274 K/uL  Mean Cell Volume : 85.2 fl  Mean Cell Hemoglobin : 29.6 pg  Mean Cell Hemoglobin Concentration : 34.7 gm/dL  Auto Neutrophil # : 14.7 K/uL  Auto Lymphocyte # : 0.5 K/uL  Auto Monocyte # : 0.6 K/uL  Auto Eosinophil # : 0.0 K/uL  Auto Basophil # : 0.0 K/uL  Auto Neutrophil % : 93.0 %  Auto Lymphocyte % : 3.0 %  Auto Monocyte % : 3.6 %  Auto Eosinophil % : 0.2 %  Auto Basophil % : 0.3 %    04-29    138  |  98  |  21  ----------------------------<  185<H>  3.8   |  24  |  0.86    Ca    9.6      29 Apr 2018 08:52  Phos  2.0     04-29  Mg     2.0     04-29  TPro  7.6  /  Alb  4.3  /  TBili  0.6  /  DBili  x   /  AST  17  /  ALT  20  /  AlkPhos  86  04-29  PT/INR - ( 29 Apr 2018 08:52 )   PT: 11.8 sec;   INR: 1.09 ratio    PTT - ( 29 Apr 2018 08:52 )  PTT:39.0 sec  CARDIAC MARKERS ( 29 Apr 2018 08:52 )  x     / <0.01 ng/mL / 109 U/L / x     / 2.3 ng/mL    11:58 - VBG - pH: 7.39  | pCO2: 44    | pO2: 36    | Lactate: 2.3    09:18 - VBG - pH: 7.50  | pCO2: 33    | pO2: 45    | Lactate: 2.8    Imaging  < from: Xray Chest 2 Views PA/Lat (04.29.18 @ 09:36) >  FINDINGS:   The heart is normal in size. There are no focal pulmonary consolidations.   There is no pneumothorax. There is no significant pleural effusion. There   are degenerative changes of the visualized spine.  IMPRESSION:   No focal consolidations.  < end of copied text >    EKG:  w. q in II, and rS in III and aVF w/o ABDIAZIZ or TWI appreciated. grossly unchanged from EKG from June2017. QTc 462

## 2018-04-29 NOTE — CONSULT NOTE ADULT - ATTENDING COMMENTS
Thank you. My team will follow.    Manda Larose M.D, F.A.C.C  [facility]   [phone]

## 2018-04-29 NOTE — H&P ADULT - PROBLEM SELECTOR PLAN 1
Patient is noted to be awoken w/ N/NBNB vomit w/ loose stool  - given significant cardiac hx w/ report of chest heaviness concerning for unstable angina. c/w DAPT. will call cardiology. trend CE q6hr. Tele monitoring. will order TTE. Currently reports CP free and chest heaviness resolved.  - on ddx includes gastroenteritis w/ hx of eating 6-7hrs before at restaurant. patient currently afebrile w/o bloody diarrhea however noted to have leukocytosis. possible viral gastroenteritis. Tachycardic on initial presentation and therefore meets SIRS criteria however appears nontoxic on exam- monitor off antibiotics. If diarrhea continues w/ fever then pan culture. CXR is clear

## 2018-04-29 NOTE — ED ADULT NURSE REASSESSMENT NOTE - NS ED NURSE REASSESS COMMENT FT1
Patient weighed - 134.1 kg. repeat labs drawn and sent as ordered. report given to Holding SANDRA Moreira. Aware there will be heparin order.

## 2018-04-29 NOTE — H&P ADULT - NSHPREVIEWOFSYSTEMS_GEN_ALL_CORE
CONSTITUTIONAL: No fever, no chills  EYES: No eye pain, no visual disturbance  Mouth: no pain in mouth, no cuts  RESPIRATORY: sob when "worked up during vomiting," no cough  CARDIOVASCULAR: chest heaviness earlier in course, no palpitations  GASTROINTESTINAL: abdominal pain per HPI, n/v/d+  GENITOURINARY: No dysuria, no hematuria  NEUROLOGICAL: Had had frontal headache, no weakness  SKIN: No itching, no rashes  MUSCULOSKELETAL: No joint pain, no joint swelling

## 2018-04-29 NOTE — H&P ADULT - PROBLEM SELECTOR PLAN 2
Chest heaviness w/ n/v in diabetic patient w/ CAD s/p 17 stent  - EKG w/o ABDIAZIZ or changes from previous. first CE set negative  - trend CE q6hr, tele monitoring, obtain TTE. Cards consult. repeat EKG

## 2018-04-29 NOTE — CONSULT NOTE ADULT - PROBLEM SELECTOR RECOMMENDATION 9
Currently asymptomatic. Continue ASA 81mg po daily, plavix 75mg po daily, metoprolol 25mg po bid, simvastatin 20mg po daily.  HEART Score 5, moderate probability of major adverse cardiac event within 6-weeks. CCS class IV.  Cardiac cath in AM to be done by Dr. Roberto.   Patient's primary cardiologist is Dr. Tommie Huynh, would like [place] [facility] to follow his patient. [phone] Currently asymptomatic. Continue ASA 81mg po daily, plavix 75mg po daily, metoprolol 25mg po bid, simvastatin 20mg po daily.  HEART Score 5, moderate probability of major adverse cardiac event within 6-weeks. CCS class IV.  Cardiac cath in AM to be done by Dr. Roberto. I have discussed with Dr. Roberto [phone]  Patient's primary cardiologist is Dr. Tommie Huynh, would like [place] [facility] to follow his patient. [phone] Currently asymptomatic. Continue ASA 81mg po daily, plavix 75mg po daily, metoprolol 25mg po bid, simvastatin 20mg po daily.  CCS class IV.  Cardiac cath in AM to be done by Dr. Roberto. I have discussed with Dr. Roberto [phone]  Patient's primary cardiologist is Dr. Tommie Huynh, would like [place] [facility] to follow his patient. [phone]

## 2018-04-29 NOTE — H&P ADULT - PROBLEM SELECTOR PLAN 8
Noted morbid obesity. will need counseling for weight reduction prior to discharge.  - additionally would benefit from outpatient sleep study given BMI and hx of snoring c/w metoprolol, amlodipine and losartan. hold hctz in setting of dehydration from emesis

## 2018-04-30 ENCOUNTER — TRANSCRIPTION ENCOUNTER (OUTPATIENT)
Age: 63
End: 2018-04-30

## 2018-04-30 LAB
ANION GAP SERPL CALC-SCNC: 14 MMOL/L — SIGNIFICANT CHANGE UP (ref 5–17)
BASOPHILS # BLD AUTO: 0.01 K/UL — SIGNIFICANT CHANGE UP (ref 0–0.2)
BASOPHILS NFR BLD AUTO: 0.2 % — SIGNIFICANT CHANGE UP (ref 0–2)
BLD GP AB SCN SERPL QL: NEGATIVE — SIGNIFICANT CHANGE UP
BUN SERPL-MCNC: 15 MG/DL — SIGNIFICANT CHANGE UP (ref 7–23)
CALCIUM SERPL-MCNC: 7.9 MG/DL — LOW (ref 8.4–10.5)
CHLORIDE SERPL-SCNC: 101 MMOL/L — SIGNIFICANT CHANGE UP (ref 96–108)
CO2 SERPL-SCNC: 23 MMOL/L — SIGNIFICANT CHANGE UP (ref 22–31)
CREAT SERPL-MCNC: 0.85 MG/DL — SIGNIFICANT CHANGE UP (ref 0.5–1.3)
EOSINOPHIL # BLD AUTO: 0.02 K/UL — SIGNIFICANT CHANGE UP (ref 0–0.5)
EOSINOPHIL NFR BLD AUTO: 0.4 % — SIGNIFICANT CHANGE UP (ref 0–6)
GLUCOSE BLDC GLUCOMTR-MCNC: 110 MG/DL — HIGH (ref 70–99)
GLUCOSE BLDC GLUCOMTR-MCNC: 113 MG/DL — HIGH (ref 70–99)
GLUCOSE BLDC GLUCOMTR-MCNC: 136 MG/DL — HIGH (ref 70–99)
GLUCOSE BLDC GLUCOMTR-MCNC: 94 MG/DL — SIGNIFICANT CHANGE UP (ref 70–99)
GLUCOSE BLDC GLUCOMTR-MCNC: 97 MG/DL — SIGNIFICANT CHANGE UP (ref 70–99)
GLUCOSE SERPL-MCNC: 130 MG/DL — HIGH (ref 70–99)
HBA1C BLD-MCNC: 6.2 % — HIGH (ref 4–5.6)
HCT VFR BLD CALC: 37.8 % — LOW (ref 39–50)
HGB BLD-MCNC: 12.3 G/DL — LOW (ref 13–17)
IMM GRANULOCYTES NFR BLD AUTO: 0.2 % — SIGNIFICANT CHANGE UP (ref 0–1.5)
LYMPHOCYTES # BLD AUTO: 0.74 K/UL — LOW (ref 1–3.3)
LYMPHOCYTES # BLD AUTO: 13 % — SIGNIFICANT CHANGE UP (ref 13–44)
MAGNESIUM SERPL-MCNC: 2.1 MG/DL — SIGNIFICANT CHANGE UP (ref 1.6–2.6)
MCHC RBC-ENTMCNC: 27.9 PG — SIGNIFICANT CHANGE UP (ref 27–34)
MCHC RBC-ENTMCNC: 32.5 GM/DL — SIGNIFICANT CHANGE UP (ref 32–36)
MCV RBC AUTO: 85.7 FL — SIGNIFICANT CHANGE UP (ref 80–100)
MONOCYTES # BLD AUTO: 0.61 K/UL — SIGNIFICANT CHANGE UP (ref 0–0.9)
MONOCYTES NFR BLD AUTO: 10.7 % — SIGNIFICANT CHANGE UP (ref 2–14)
NEUTROPHILS # BLD AUTO: 4.31 K/UL — SIGNIFICANT CHANGE UP (ref 1.8–7.4)
NEUTROPHILS NFR BLD AUTO: 75.5 % — SIGNIFICANT CHANGE UP (ref 43–77)
PHOSPHATE SERPL-MCNC: 2 MG/DL — LOW (ref 2.5–4.5)
PLATELET # BLD AUTO: 216 K/UL — SIGNIFICANT CHANGE UP (ref 150–400)
POTASSIUM SERPL-MCNC: 3.6 MMOL/L — SIGNIFICANT CHANGE UP (ref 3.5–5.3)
POTASSIUM SERPL-SCNC: 3.6 MMOL/L — SIGNIFICANT CHANGE UP (ref 3.5–5.3)
RBC # BLD: 4.41 M/UL — SIGNIFICANT CHANGE UP (ref 4.2–5.8)
RBC # FLD: 14.5 % — SIGNIFICANT CHANGE UP (ref 10.3–14.5)
RH IG SCN BLD-IMP: NEGATIVE — SIGNIFICANT CHANGE UP
SODIUM SERPL-SCNC: 138 MMOL/L — SIGNIFICANT CHANGE UP (ref 135–145)
WBC # BLD: 5.7 K/UL — SIGNIFICANT CHANGE UP (ref 3.8–10.5)
WBC # FLD AUTO: 5.7 K/UL — SIGNIFICANT CHANGE UP (ref 3.8–10.5)

## 2018-04-30 PROCEDURE — 93010 ELECTROCARDIOGRAM REPORT: CPT

## 2018-04-30 PROCEDURE — 99233 SBSQ HOSP IP/OBS HIGH 50: CPT

## 2018-04-30 PROCEDURE — 99223 1ST HOSP IP/OBS HIGH 75: CPT

## 2018-04-30 RX ORDER — ATORVASTATIN CALCIUM 80 MG/1
1 TABLET, FILM COATED ORAL
Qty: 30 | Refills: 2 | OUTPATIENT
Start: 2018-04-30 | End: 2018-07-28

## 2018-04-30 RX ORDER — METFORMIN HYDROCHLORIDE 850 MG/1
1 TABLET ORAL
Qty: 0 | Refills: 0 | COMMUNITY

## 2018-04-30 RX ORDER — ASPIRIN/CALCIUM CARB/MAGNESIUM 324 MG
1 TABLET ORAL
Qty: 30 | Refills: 5
Start: 2018-04-30 | End: 2018-10-26

## 2018-04-30 RX ORDER — SODIUM,POTASSIUM PHOSPHATES 278-250MG
1 POWDER IN PACKET (EA) ORAL
Qty: 0 | Refills: 0 | Status: DISCONTINUED | OUTPATIENT
Start: 2018-04-30 | End: 2018-05-01

## 2018-04-30 RX ORDER — ASPIRIN/CALCIUM CARB/MAGNESIUM 324 MG
1 TABLET ORAL
Qty: 0 | Refills: 0 | COMMUNITY

## 2018-04-30 RX ORDER — CLOPIDOGREL BISULFATE 75 MG/1
1 TABLET, FILM COATED ORAL
Qty: 30 | Refills: 11 | OUTPATIENT
Start: 2018-04-30 | End: 2019-04-24

## 2018-04-30 RX ORDER — SODIUM CHLORIDE 9 MG/ML
1000 INJECTION, SOLUTION INTRAVENOUS
Qty: 0 | Refills: 0 | Status: DISCONTINUED | OUTPATIENT
Start: 2018-04-30 | End: 2018-05-01

## 2018-04-30 RX ORDER — SIMVASTATIN 20 MG/1
1 TABLET, FILM COATED ORAL
Qty: 0 | Refills: 0 | COMMUNITY

## 2018-04-30 RX ADMIN — AMLODIPINE BESYLATE 2.5 MILLIGRAM(S): 2.5 TABLET ORAL at 05:11

## 2018-04-30 RX ADMIN — Medication 81 MILLIGRAM(S): at 08:38

## 2018-04-30 RX ADMIN — Medication 1 TABLET(S): at 17:40

## 2018-04-30 RX ADMIN — LOSARTAN POTASSIUM 100 MILLIGRAM(S): 100 TABLET, FILM COATED ORAL at 05:11

## 2018-04-30 RX ADMIN — Medication 4 GRAM(S): at 17:40

## 2018-04-30 RX ADMIN — ATORVASTATIN CALCIUM 80 MILLIGRAM(S): 80 TABLET, FILM COATED ORAL at 21:41

## 2018-04-30 RX ADMIN — CLOPIDOGREL BISULFATE 75 MILLIGRAM(S): 75 TABLET, FILM COATED ORAL at 08:38

## 2018-04-30 RX ADMIN — Medication 25 MILLIGRAM(S): at 05:11

## 2018-04-30 RX ADMIN — Medication 25 MILLIGRAM(S): at 17:40

## 2018-04-30 NOTE — DISCHARGE NOTE ADULT - PATIENT PORTAL LINK FT
You can access the [facility] Patient Portal, offered by [facility], by registering with the following website: [email or web address]

## 2018-04-30 NOTE — CONSULT NOTE ADULT - SUBJECTIVE AND OBJECTIVE BOX
HPI:  62M w/ CAD w/ hx of Multivessel CAD, MI s/p DESx17 (last 2015 (LAD, RCA and LCX), Former Smoker (40 pack year, quit 2009), DM2 on metformin, HTN, morbid obesity, and gout presents with hours of N/V, sob and chest heaviness. Patient reports that he was awoken in the middle of the night around 3am w/ nausea and had multiple episodes of NBNB emesis occurring every hour w/ proceeding epigastric discomfort (described as "oh boy here we go" quality and not sharp/pressure/burning, of medium severity and alleviated w/ emesis). He subsequently reported chest heaviness over central chest and radiating to central neck of medium quality. The chest heaviness was similar and not similar in character to previous times when he required stents. He also report SOB and he indicates he had it when "getting worked up" during episodes of vomiting. Also on review the patient reports that there have been a couple of episodes of loose stool since awakening and did eat out yesterday eating chicken dish at a restaurant. No report of fevers, chills, palpitations, inability to lie flat, sob when lying flat, hx of heart failure, hx of medication non-adherence, abdominal distension, lower extremity swelling, or rash. Also he has family hx significant for death of father at age 39 from MI. In the ED, the patient presents w/ VS 97.9F, 155/84, 106, 18 96% on RA. He was given  chewable (taken home doses of DAPT), zofran 4mg IV and 1L NS. Did have recurrence of chest pressure last evening which dissipated on its own. CE x 2 so far are negative.  Had stress test last year in our office and showed Old inferior MI, but no ischemia.  Reports prior to this occasional getting some discomfort with exerting himself.     PAST MEDICAL & SURGICAL HISTORY:  Diabetes  Gout  Hypertension  Morbid obesity  Dyslipidemia  BPH (benign prostatic hyperplasia)  CAD (coronary artery disease)  S/P coronary artery stent placement    SOCIAL HISTORY: Former Smoker/Recovered from ETOH abuse/ No Ilicit Drug use.    FAMILY HISTORY:  Family history of acute myocardial infarction (Father)    Allergies  Levaquin (Joint Pain)    HOME MEDICATIONS:   Home Medications:  amLODIPine 2.5 mg oral tablet: 1 tab(s) orally once a day (29 Apr 2018 18:18)  aspirin 81 mg oral tablet: 1 tab(s) orally once a day (29 Apr 2018 18:18)  hydrochlorothiazide-losartan 12.5 mg-100 mg oral tablet: 1 tab(s) orally once a day (29 Apr 2018 18:18)  metFORMIN 500 mg oral tablet: 1 tab(s) orally 2 times a day (29 Apr 2018 18:18)  metoprolol tartrate 25 mg oral tablet: 1 tab(s) orally 2 times a day (29 Apr 2018 18:18)  Omega-3 oral capsule: 1 cap(s) orally once a day (29 Apr 2018 18:18)  Plavix 75 mg oral tablet: 1 tab(s) orally once a day (29 Apr 2018 18:18)  simvastatin 20 mg oral tablet: 1 tab(s) orally once a day (29 Apr 2018 18:18)  Vitamin D2 50,000 intl units (1.25 mg) oral capsule: 1 cap(s) orally once a week on Mondays (29 Apr 2018 18:18)      HOSPITAL MEDICATIONS:   MEDICATIONS  (STANDING):  amLODIPine   Tablet 2.5 milliGRAM(s) Oral daily  aspirin enteric coated 81 milliGRAM(s) Oral daily  atorvastatin 80 milliGRAM(s) Oral at bedtime  clopidogrel Tablet 75 milliGRAM(s) Oral daily  dextrose 5%. 1000 milliLiter(s) (50 mL/Hr) IV Continuous <Continuous>  dextrose 50% Injectable 12.5 Gram(s) IV Push once  dextrose 50% Injectable 25 Gram(s) IV Push once  dextrose 50% Injectable 25 Gram(s) IV Push once  insulin lispro (HumaLOG) corrective regimen sliding scale   SubCutaneous three times a day before meals  insulin lispro (HumaLOG) corrective regimen sliding scale   SubCutaneous at bedtime  losartan 100 milliGRAM(s) Oral daily  metoprolol tartrate 25 milliGRAM(s) Oral two times a day  omega-3-Acid Ethyl Esters 4 Gram(s) Oral daily    MEDICATIONS  (PRN):  dextrose Gel 1 Dose(s) Oral once PRN Blood Glucose LESS THAN 70 milliGRAM(s)/deciliter  glucagon  Injectable 1 milliGRAM(s) IntraMuscular once PRN Glucose LESS THAN 70 milligrams/deciliter      REVIEW OF SYSTEMS: 13 systems were reviewed and all negative except for comments above.    Vital Signs Last 24 Hrs  T(C): 36.7 (30 Apr 2018 05:09), Max: 37.2 (29 Apr 2018 16:10)  T(F): 98.1 (30 Apr 2018 05:09), Max: 99 (29 Apr 2018 16:10)  HR: 75 (30 Apr 2018 07:47) (75 - 94)  BP: 122/69 (30 Apr 2018 07:47) (114/97 - 150/74)  BP(mean): --  RR: 18 (30 Apr 2018 07:47) (18 - 18)  SpO2: 95% (30 Apr 2018 07:47) (95% - 98%)Daily     Daily I&O's Summary      PHYSICAL EXAM:    Constitutional: NAD, awake and alert, well-developed  HEENT: PERRLA, EOMI,  No oral cyanosis. Oropharynx Clean and Dry.  Neck:  supple,  No JVD, No Thyroid enlargement. No carotid bruits  Respiratory: Breath sounds are clear bilaterally, No wheezing, rales or rhonchi  Cardiovascular: NL S1 and S2, RRR, +1/6 MOUSTAPHA to LUSB  Gastrointestinal: Bowel Sounds present, soft, NT, ND,    Extremities: No peripheral edema. No clubbing or cyanosis.  Barbeau Test performed in RUE and Negative.  Vascular: 2+ peripheral pulses in LE   Neurological: A/O x 3, no focal motor deficits  Musculoskeletal: no calf tenderness.  Skin: No rashes.    LABS: All Labs Reviewed:                        12.3   5.70  )-----------( 216      ( 30 Apr 2018 07:46 )             37.8                         15.5   15.8  )-----------( 274      ( 29 Apr 2018 08:52 )             44.8     30 Apr 2018 05:50    138    |  101    |  15     ----------------------------<  130    3.6     |  23     |  0.85   29 Apr 2018 16:09    138    |  100    |  20     ----------------------------<  104    5.1     |  22     |  0.76   29 Apr 2018 08:52    138    |  98     |  21     ----------------------------<  185    3.8     |  24     |  0.86     Ca    7.9        30 Apr 2018 05:50  Ca    8.6        29 Apr 2018 16:09  Ca    9.6        29 Apr 2018 08:52  Phos  2.0       30 Apr 2018 05:50  Phos  2.8       29 Apr 2018 16:09  Phos  2.0       29 Apr 2018 08:52  Mg     2.1       30 Apr 2018 05:50  Mg     2.0       29 Apr 2018 16:09  Mg     2.0       29 Apr 2018 08:52    TPro  7.6    /  Alb  4.3    /  TBili  0.6    /  DBili  x      /  AST  17     /  ALT  20     /  AlkPhos  86     29 Apr 2018 08:52    PT/INR - ( 29 Apr 2018 08:52 )   PT: 11.8 sec;   INR: 1.09 ratio         PTT - ( 29 Apr 2018 08:52 )  PTT:39.0 sec  CARDIAC MARKERS ( 29 Apr 2018 22:08 )  x     / <0.01 ng/mL / 85 U/L / x     / 1.4 ng/mL  CARDIAC MARKERS ( 29 Apr 2018 16:09 )  x     / <0.01 ng/mL / 127 U/L / x     / x      CARDIAC MARKERS ( 29 Apr 2018 08:52 )  x     / <0.01 ng/mL / 109 U/L / x     / 2.3 ng/mL    04-29 @ 08:52  Pro Bnp 87    RADIOLOGY:  < from: Xray Chest 2 Views PA/Lat (04.29.18 @ 09:36) >  NTERPRETATION:  EXAMINATION: XR CHEST PA LAT 2V    CLINICAL INDICATION: Chest Pain    TECHNIQUE: Frontal and lateral views of the chest were obtained.    COMPARISON: 1/23/2018.    FINDINGS:     The heart is normal in size. There are no focal pulmonary consolidations.   There is no pneumothorax. There is no significant pleural effusion. There   are degenerative changes of the visualized spine.    IMPRESSION:   No focal consolidations.    < end of copied text >    EKG:  < from: 12 Lead ECG (04.29.18 @ 23:17) >  NORMAL SINUS RHYTHM  INFERIOR-POSTERIOR INFARCT , AGE UNDETERMINED    < end of copied text >    ECHO:  6/2017: NL FX, Minimal MR, DD, LLVH    < from: Cardiac Cath Lab - Adult (03.30.15 @ 09:27) >  VENTRICLES: There was no diagnostic evidence of left ventricular regional  wall motion abnormalities. Global left ventricular function was normal. EF  estimated was 55 %.  VALVES: AORTIC VALVE: There was no aortic stenosis. MITRAL VALVE: The  mitral valve exhibited no regurgitation.  CORONARY VESSELS: The coronary circulation is right dominant.  LM:   --  LM: The vessel was medium to large sized. Angiography showed mild  atherosclerosis with no flow limiting lesions.  LAD:   --  Proximal LAD: There was a tubular 75 % stenosis.  --  D1: There was a discrete 60 % stenosis.  CX:   --  Proximal circumflex: There was a tubular 90 % stenosis.  RCA:   --  Mid RCA: There was a tubular 0 % stenosis at the site of a prior  stent.  --  RPDA: There was a 80 % stenosis.  COMPLICATIONS: There were no complications.  DIAGNOSTIC IMPRESSIONS: 3 Vessel CAD with prior stents in RCA patent and  new lession in proximal LCX, mid Rt. PDA and Proximal LAD. NL LV FX.    < end of copied text >

## 2018-04-30 NOTE — PROGRESS NOTE ADULT - SUBJECTIVE AND OBJECTIVE BOX
Patient is a 62y old  Male who presents with a chief complaint of hours of N/V, SOB and chest heaviness (29 Apr 2018 12:47)      SUBJECTIVE / OVERNIGHT EVENTS: Seen post cath, feels well, chest pain has completely resolved, no sob, palpitations, n/v.     MEDICATIONS  (STANDING):  amLODIPine   Tablet 2.5 milliGRAM(s) Oral daily  aspirin enteric coated 81 milliGRAM(s) Oral daily  atorvastatin 80 milliGRAM(s) Oral at bedtime  clopidogrel Tablet 75 milliGRAM(s) Oral daily  dextrose 5%. 1000 milliLiter(s) (50 mL/Hr) IV Continuous <Continuous>  dextrose 50% Injectable 12.5 Gram(s) IV Push once  dextrose 50% Injectable 25 Gram(s) IV Push once  dextrose 50% Injectable 25 Gram(s) IV Push once  insulin lispro (HumaLOG) corrective regimen sliding scale   SubCutaneous three times a day before meals  insulin lispro (HumaLOG) corrective regimen sliding scale   SubCutaneous at bedtime  losartan 100 milliGRAM(s) Oral daily  metoprolol tartrate 25 milliGRAM(s) Oral two times a day  omega-3-Acid Ethyl Esters 4 Gram(s) Oral daily  sodium chloride 0.45%. 1000 milliLiter(s) (50 mL/Hr) IV Continuous <Continuous>    MEDICATIONS  (PRN):  dextrose Gel 1 Dose(s) Oral once PRN Blood Glucose LESS THAN 70 milliGRAM(s)/deciliter  glucagon  Injectable 1 milliGRAM(s) IntraMuscular once PRN Glucose LESS THAN 70 milligrams/deciliter      Vital Signs Last 24 Hrs  T(C): 36.8 (30 Apr 2018 07:47), Max: 37.2 (29 Apr 2018 16:10)  T(F): 98.3 (30 Apr 2018 07:47), Max: 99 (29 Apr 2018 16:10)  HR: 72 (30 Apr 2018 13:30) (66 - 88)  BP: 116/58 (30 Apr 2018 13:30) (116/58 - 150/74)  BP(mean): --  RR: 17 (30 Apr 2018 13:30) (16 - 18)  SpO2: 97% (30 Apr 2018 13:30) (94% - 100%)  CAPILLARY BLOOD GLUCOSE      POCT Blood Glucose.: 110 mg/dL (30 Apr 2018 11:04)  POCT Blood Glucose.: 136 mg/dL (30 Apr 2018 07:48)  POCT Blood Glucose.: 117 mg/dL (29 Apr 2018 21:10)  POCT Blood Glucose.: 94 mg/dL (29 Apr 2018 17:59)    I&O's Summary    30 Apr 2018 07:01  -  30 Apr 2018 13:54  --------------------------------------------------------  IN: 360 mL / OUT: 0 mL / NET: 360 mL        PHYSICAL EXAM:  GENERAL: NAD, well-developed  HEAD:  Atraumatic, Normocephalic  NECK: Supple, No JVD  CHEST/LUNG: Clear to auscultation bilaterally; No wheeze  HEART: Regular rate and rhythm; No murmurs,  ABDOMEN: Soft, Nontender, Nondistended; Bowel sounds present  EXTREMITIES:  2+ Peripheral Pulses, No clubbing, cyanosis, or edema. R radial cath band intact  PSYCH: AAOx3  NEUROLOGY: non-focal    LABS:                        12.3   5.70  )-----------( 216      ( 30 Apr 2018 07:46 )             37.8     04-30    138  |  101  |  15  ----------------------------<  130<H>  3.6   |  23  |  0.85    Ca    7.9<L>      30 Apr 2018 05:50  Phos  2.0     04-30  Mg     2.1     04-30    TPro  7.6  /  Alb  4.3  /  TBili  0.6  /  DBili  x   /  AST  17  /  ALT  20  /  AlkPhos  86  04-29    PT/INR - ( 29 Apr 2018 08:52 )   PT: 11.8 sec;   INR: 1.09 ratio         PTT - ( 29 Apr 2018 08:52 )  PTT:39.0 sec  CARDIAC MARKERS ( 29 Apr 2018 22:08 )  x     / <0.01 ng/mL / 85 U/L / x     / 1.4 ng/mL  CARDIAC MARKERS ( 29 Apr 2018 16:09 )  x     / <0.01 ng/mL / 127 U/L / x     / x      CARDIAC MARKERS ( 29 Apr 2018 08:52 )  x     / <0.01 ng/mL / 109 U/L / x     / 2.3 ng/mL      RADIOLOGY & ADDITIONAL TESTS:    Imaging Personally Reviewed: cardiac cath: DIAGNOSTIC IMPRESSIONS: 3 Vessel CAD with new lessions in RCA and LAD. RCA  c/w instent restenosis. LAD de nuvo lession. Low NL LV FX with inferior  and inferolateral hypokenisis.  DIAGNOSTIC RECOMMENDATIONS: PCI of RCA and FFR of LAD and if significant  then PCI of LAD.  INTERVENTIONAL IMPRESSIONS: Successful PCI of RCA, +FFR or LAD and  Successful PCI of LAD.      Consultant(s) Notes Reviewed:  cards    Care Discussed with Consultants/Other Providers:

## 2018-04-30 NOTE — DISCHARGE NOTE ADULT - PLAN OF CARE
Your hemoglobin A1C will be between 7-8 Continue to follow with your primary care MD or your endocrinologist.  Follow a heart healthy diabetic diet. If you check your fingerstick glucose at home, call your MD if it is greater than 250mg/dL on 2 occasions or less than 100mg/dL on 2 occasions. Know signs of low blood sugar, such as: dizziness, shakiness, sweating, confusion, hunger, nervousness-drink 4 ounces apple juice if occurs and call your doctor. Know early signs of high blood sugar, such as: frequent urination, increased thirst, blurry vision, fatigue, headache - call your doctor if this occurs. Follow with other practitioners to care for your diabetes, such as ophthalmologist and podiatrist. Your blood pressure will be controlled. Continue with your blood pressure medications; eat a heart healthy diet with low salt diet; exercise regularly (consult with your physician or cardiologist first); maintain a heart healthy weight; if you smoke - quit (A resource to help you stop smoking is the [facility] – phone number [phone].); include healthy ways to manage stress. Continue to follow with your primary care physician or cardiologist. Your LDL cholesterol will be less than 70mg/dL Continue with your cholesterol medications. Eat a heart healthy diet that is low in saturated fats and salt, and includes whole grains, fruits, vegetables and lean protein; exercise regularly (consult with your physician or cardiologist first); maintain a heart healthy weight. Continue to follow with your primary physician or cardiologist for treatment goals, continue medication, have liver function testing every 3 months as anti lipid medications can cause liver irritation. If you smoke - quit (A resource to help you stop smoking is the [facility] – phone number [phone].). Pt remains chest pain free and understands post cath discharge instructions No heavy lifting or pushing/pulling with procedure arm for 2 weeks. No driving for 2 days. You may shower 24 hours following the procedure but avoid baths/swimming for 1 week. Check your wrist site for bleeding and/or swelling daily following procedure and call your doctor immediately if it occurs or if you experience increased pain at the site. Follow up with your cardiologist in 1-2 weeks. You may call [facility] if you have any questions/concerns regarding your procedure [phone].

## 2018-04-30 NOTE — CONSULT NOTE ADULT - PROBLEM SELECTOR RECOMMENDATION 9
Chest pain with typical and atypical features with a hx. of multivessel CAD with multiple risk factors as well.   Plan: CC for further evaluation and assessment.  Risks, benefits, and alternatives of cardiac catheterization with percutaneous coronary intervention discussed with the patient/family including but not limited to death, myocardial infarction, stroke, bleeding, infection, or vascular injury. Patient expressed understanding of these risks and signed informed consent for procedure.

## 2018-04-30 NOTE — DISCHARGE NOTE ADULT - MEDICATION SUMMARY - MEDICATIONS TO TAKE
I will START or STAY ON the medications listed below when I get home from the hospital:    aspirin 81 mg oral tablet  -- 1 tab(s) by mouth once a day  -- Indication: For TO KEEP STENT OPEN     metFORMIN 500 mg oral tablet  -- 1 tab(s) by mouth 2 times a day. RESTART ON  05/03/2018   -- Indication: For Diabetes mellitus     atorvastatin 80 mg oral tablet  -- 1 tab(s) by mouth once a day (at bedtime)  -- Indication: For High cholesterol     hydrochlorothiazide-losartan 12.5 mg-100 mg oral tablet  -- 1 tab(s) by mouth once a day  -- Indication: For High blood pressure     Plavix 75 mg oral tablet  -- 1 tab(s) by mouth once a day  -- Indication: For TO KEEP STENT OPEN     metoprolol tartrate 25 mg oral tablet  -- 1 tab(s) by mouth 2 times a day  -- Indication: For High blood pressure     amLODIPine 2.5 mg oral tablet  -- 1 tab(s) by mouth once a day  -- Indication: For High blood pressure     Omega-3 oral capsule  -- 1 cap(s) by mouth once a day  -- Indication: For Supplement     Vitamin D2 50,000 intl units (1.25 mg) oral capsule  -- 1 cap(s) by mouth once a week on Mondays  -- Indication: For Supplement I will START or STAY ON the medications listed below when I get home from the hospital:    aspirin 81 mg oral tablet  -- 1 tab(s) by mouth once a day  -- Indication: For TO KEEP STENT OPEN     metFORMIN 500 mg oral tablet  -- 1 tab(s) by mouth 2 times a day. RESTART ON  05/03/2018   -- Indication: For Diabetes mellitus     atorvastatin 80 mg oral tablet  -- 1 tab(s) by mouth once a day (at bedtime)  -- Indication: For for your cholesterol    hydrochlorothiazide-losartan 12.5 mg-100 mg oral tablet  -- 1 tab(s) by mouth once a day  -- Indication: For High blood pressure     Plavix 75 mg oral tablet  -- 1 tab(s) by mouth once a day  -- Indication: For TO KEEP STENT OPEN     metoprolol tartrate 25 mg oral tablet  -- 1 tab(s) by mouth 2 times a day  -- Indication: For High blood pressure     amLODIPine 2.5 mg oral tablet  -- 1 tab(s) by mouth once a day  -- Indication: For High blood pressure     Omega-3 oral capsule  -- 1 cap(s) by mouth once a day  -- Indication: For Supplement     Vitamin D2 50,000 intl units (1.25 mg) oral capsule  -- 1 cap(s) by mouth once a week on Mondays  -- Indication: For Supplement

## 2018-04-30 NOTE — DISCHARGE NOTE ADULT - CARE PLAN
Principal Discharge DX:	CAD (coronary artery disease)  Goal:	Pt remains chest pain free and understands post cath discharge instructions  Assessment and plan of treatment:	No heavy lifting or pushing/pulling with procedure arm for 2 weeks. No driving for 2 days. You may shower 24 hours following the procedure but avoid baths/swimming for 1 week. Check your wrist site for bleeding and/or swelling daily following procedure and call your doctor immediately if it occurs or if you experience increased pain at the site. Follow up with your cardiologist in 1-2 weeks. You may call [facility] if you have any questions/concerns regarding your procedure [phone].  Secondary Diagnosis:	Diabetes  Goal:	Your hemoglobin A1C will be between 7-8  Assessment and plan of treatment:	Continue to follow with your primary care MD or your endocrinologist.  Follow a heart healthy diabetic diet. If you check your fingerstick glucose at home, call your MD if it is greater than 250mg/dL on 2 occasions or less than 100mg/dL on 2 occasions. Know signs of low blood sugar, such as: dizziness, shakiness, sweating, confusion, hunger, nervousness-drink 4 ounces apple juice if occurs and call your doctor. Know early signs of high blood sugar, such as: frequent urination, increased thirst, blurry vision, fatigue, headache - call your doctor if this occurs. Follow with other practitioners to care for your diabetes, such as ophthalmologist and podiatrist.  Secondary Diagnosis:	Hypertension  Goal:	Your blood pressure will be controlled.  Assessment and plan of treatment:	Continue with your blood pressure medications; eat a heart healthy diet with low salt diet; exercise regularly (consult with your physician or cardiologist first); maintain a heart healthy weight; if you smoke - quit (A resource to help you stop smoking is the [facility] – phone number [phone].); include healthy ways to manage stress. Continue to follow with your primary care physician or cardiologist.  Secondary Diagnosis:	Dyslipidemia  Goal:	Your LDL cholesterol will be less than 70mg/dL  Assessment and plan of treatment:	Continue with your cholesterol medications. Eat a heart healthy diet that is low in saturated fats and salt, and includes whole grains, fruits, vegetables and lean protein; exercise regularly (consult with your physician or cardiologist first); maintain a heart healthy weight. Continue to follow with your primary physician or cardiologist for treatment goals, continue medication, have liver function testing every 3 months as anti lipid medications can cause liver irritation. If you smoke - quit (A resource to help you stop smoking is the [facility] – phone number [phone].).

## 2018-04-30 NOTE — DISCHARGE NOTE ADULT - ADDITIONAL INSTRUCTIONS
Follow-up with your Cardiologist in 1-2 weeks   ******Do not stop you Aspirin or Plavix unless instructed to do so by your cardiologist. ****** Follow-up with Dr. Huynh, Cardiologist in 1-2 weeks will have your echo done as outpatient  ******Do not stop you Aspirin or Plavix unless instructed to do so by your cardiologist. ******

## 2018-04-30 NOTE — DISCHARGE NOTE ADULT - CARE PROVIDER_API CALL
Rahul Huynh), Cardiovascular Disease; Internal Medicine  [address] [place] [address]  Phone: [phone]  Fax: [phone]

## 2018-04-30 NOTE — DISCHARGE NOTE ADULT - HOSPITAL COURSE
62M w/ CAD w/ hx of MI s/p DESx17 (last 2015 to LAD), Former Smoker (40 pack year, quit 2009), DM2 on metformin, HTN, morbid obesity, and gout presents to [facility] for hours of N/V, sob and chest heaviness. Patient reports that he was awoken in the middle of the night around 3am w/ nausea and had multiple episodes of NBNB emesis occurring every hour w/ proceeding epigastric discomfort (described as "oh boy here we go" quality and not sharp/pressure/burning, of medium severity and alleviated w/ emesis). The patient initially denied chest pain however nursing indicated that he had "chest heaviness" for which patient upon reminding said was occurring over central chest and radiating to central neck of medium quality. The chest heaviness was not similar in character to previous times when he required stents, reporting that during those times he had "all the bells and whistles with feeling like something was sitting on his chest." W/ regard to sob he indicates he had it when "getting worked up" during episodes of vomiting. Also on review the patient reports that there have been a couple of episodes of loose stool since awakening and did eat out yesterday eating chicken dish at a restaurant. Pt is now s/p [facility] BULL x 1 mRCA & BULL x 1 mLAD via right radial artery access. Pt tolerated the procedure well. Cardiac cath site benign. Post-procedure discharge instructions discussed and questions addressed 62M w/ CAD w/ hx of MI s/p DESx17 (last 2015 to LAD), Former Smoker (40 pack year, quit 2009), DM2 on metformin, HTN, morbid obesity, and gout presents to [facility] for hours of N/V, sob and chest heaviness. Patient reports that he was awoken in the middle of the night around 3am w/ nausea and had multiple episodes of NBNB emesis occurring every hour w/ proceeding epigastric discomfort (described as "oh boy here we go" quality and not sharp/pressure/burning, of medium severity and alleviated w/ emesis). The patient initially denied chest pain however nursing indicated that he had "chest heaviness" for which patient upon reminding said was occurring over central chest and radiating to central neck of medium quality. The chest heaviness was not similar in character to previous times when he required stents, reporting that during those times he had "all the bells and whistles with feeling like something was sitting on his chest." W/ regard to sob he indicates he had it when "getting worked up" during episodes of vomiting. Also on review the patient reports that there have been a couple of episodes of loose stool since awakening and did eat out yesterday eating chicken dish at a restaurant. Pt is now s/p [facility] BULL x 1 mRCA & BULL x 1 mLAD via right radial artery access. Pt tolerated the procedure well. Cardiac cath site benign. Post-procedure discharge instructions discussed and questions addressed.  Pt to followup with Dr. Huynh, Cardiologist for Echo in 1-2 weeks as d/w Dr. Guerra.  Pt stable for d/c home with no 62M w/ CAD w/ hx of MI s/p DESx17 (last 2015 to LAD), Former Smoker (40 pack year, quit 2009), DM2 on metformin, HTN, morbid obesity, and gout presents to [facility] for hours of N/V, sob and chest heaviness. Patient reports that he was awoken in the middle of the night around 3am w/ nausea and had multiple episodes of NBNB emesis occurring every hour w/ proceeding epigastric discomfort (described as "oh boy here we go" quality and not sharp/pressure/burning, of medium severity and alleviated w/ emesis). The patient initially denied chest pain however nursing indicated that he had "chest heaviness" for which patient upon reminding said was occurring over central chest and radiating to central neck of medium quality. The chest heaviness was not similar in character to previous times when he required stents, reporting that during those times he had "all the bells and whistles with feeling like something was sitting on his chest." W/ regard to sob he indicates he had it when "getting worked up" during episodes of vomiting. Also on review the patient reports that there have been a couple of episodes of loose stool since awakening and did eat out yesterday eating chicken dish at a restaurant. Pt is now s/p [facility] BULL x 1 mRCA & BULL x 1 mLAD via right radial artery access. Pt tolerated the procedure well. Cardiac cath site benign. Post-procedure discharge instructions discussed and questions addressed.  Pt to followup with Dr. Huynh, Cardiologist for Echo in 1-2 weeks as d/w Dr. Guerra.  Pt stable for d/c home.

## 2018-05-01 VITALS
OXYGEN SATURATION: 97 % | DIASTOLIC BLOOD PRESSURE: 91 MMHG | SYSTOLIC BLOOD PRESSURE: 156 MMHG | HEART RATE: 68 BPM | RESPIRATION RATE: 17 BRPM | TEMPERATURE: 98 F

## 2018-05-01 LAB
ANION GAP SERPL CALC-SCNC: 12 MMOL/L — SIGNIFICANT CHANGE UP (ref 5–17)
BASOPHILS # BLD AUTO: 0 K/UL — SIGNIFICANT CHANGE UP (ref 0–0.2)
BASOPHILS NFR BLD AUTO: 0.5 % — SIGNIFICANT CHANGE UP (ref 0–2)
BUN SERPL-MCNC: 11 MG/DL — SIGNIFICANT CHANGE UP (ref 7–23)
CALCIUM SERPL-MCNC: 8.2 MG/DL — LOW (ref 8.4–10.5)
CHLORIDE SERPL-SCNC: 101 MMOL/L — SIGNIFICANT CHANGE UP (ref 96–108)
CO2 SERPL-SCNC: 24 MMOL/L — SIGNIFICANT CHANGE UP (ref 22–31)
CREAT SERPL-MCNC: 0.81 MG/DL — SIGNIFICANT CHANGE UP (ref 0.5–1.3)
EOSINOPHIL # BLD AUTO: 0.1 K/UL — SIGNIFICANT CHANGE UP (ref 0–0.5)
EOSINOPHIL NFR BLD AUTO: 1.4 % — SIGNIFICANT CHANGE UP (ref 0–6)
GLUCOSE BLDC GLUCOMTR-MCNC: 104 MG/DL — HIGH (ref 70–99)
GLUCOSE BLDC GLUCOMTR-MCNC: 112 MG/DL — HIGH (ref 70–99)
GLUCOSE SERPL-MCNC: 116 MG/DL — HIGH (ref 70–99)
HCT VFR BLD CALC: 37.5 % — LOW (ref 39–50)
HGB BLD-MCNC: 13.1 G/DL — SIGNIFICANT CHANGE UP (ref 13–17)
LACTATE SERPL-SCNC: 0.8 MMOL/L — SIGNIFICANT CHANGE UP (ref 0.7–2)
LYMPHOCYTES # BLD AUTO: 0.9 K/UL — LOW (ref 1–3.3)
LYMPHOCYTES # BLD AUTO: 15.2 % — SIGNIFICANT CHANGE UP (ref 13–44)
MCHC RBC-ENTMCNC: 30 PG — SIGNIFICANT CHANGE UP (ref 27–34)
MCHC RBC-ENTMCNC: 34.9 GM/DL — SIGNIFICANT CHANGE UP (ref 32–36)
MCV RBC AUTO: 85.8 FL — SIGNIFICANT CHANGE UP (ref 80–100)
MONOCYTES # BLD AUTO: 0.7 K/UL — SIGNIFICANT CHANGE UP (ref 0–0.9)
MONOCYTES NFR BLD AUTO: 11.5 % — SIGNIFICANT CHANGE UP (ref 2–14)
NEUTROPHILS # BLD AUTO: 4.3 K/UL — SIGNIFICANT CHANGE UP (ref 1.8–7.4)
NEUTROPHILS NFR BLD AUTO: 71.4 % — SIGNIFICANT CHANGE UP (ref 43–77)
PLATELET # BLD AUTO: 182 K/UL — SIGNIFICANT CHANGE UP (ref 150–400)
POTASSIUM SERPL-MCNC: 3.4 MMOL/L — LOW (ref 3.5–5.3)
POTASSIUM SERPL-SCNC: 3.4 MMOL/L — LOW (ref 3.5–5.3)
RBC # BLD: 4.37 M/UL — SIGNIFICANT CHANGE UP (ref 4.2–5.8)
RBC # FLD: 13.1 % — SIGNIFICANT CHANGE UP (ref 10.3–14.5)
SODIUM SERPL-SCNC: 137 MMOL/L — SIGNIFICANT CHANGE UP (ref 135–145)
WBC # BLD: 6.1 K/UL — SIGNIFICANT CHANGE UP (ref 3.8–10.5)
WBC # FLD AUTO: 6.1 K/UL — SIGNIFICANT CHANGE UP (ref 3.8–10.5)

## 2018-05-01 PROCEDURE — 82435 ASSAY OF BLOOD CHLORIDE: CPT

## 2018-05-01 PROCEDURE — 84132 ASSAY OF SERUM POTASSIUM: CPT

## 2018-05-01 PROCEDURE — 99239 HOSP IP/OBS DSCHRG MGMT >30: CPT

## 2018-05-01 PROCEDURE — 82550 ASSAY OF CK (CPK): CPT

## 2018-05-01 PROCEDURE — 84295 ASSAY OF SERUM SODIUM: CPT

## 2018-05-01 PROCEDURE — 82947 ASSAY GLUCOSE BLOOD QUANT: CPT

## 2018-05-01 PROCEDURE — 99285 EMERGENCY DEPT VISIT HI MDM: CPT | Mod: 25

## 2018-05-01 PROCEDURE — 93005 ELECTROCARDIOGRAM TRACING: CPT

## 2018-05-01 PROCEDURE — 84100 ASSAY OF PHOSPHORUS: CPT

## 2018-05-01 PROCEDURE — 84484 ASSAY OF TROPONIN QUANT: CPT

## 2018-05-01 PROCEDURE — 99153 MOD SED SAME PHYS/QHP EA: CPT

## 2018-05-01 PROCEDURE — 83690 ASSAY OF LIPASE: CPT

## 2018-05-01 PROCEDURE — 83735 ASSAY OF MAGNESIUM: CPT

## 2018-05-01 PROCEDURE — 80053 COMPREHEN METABOLIC PANEL: CPT

## 2018-05-01 PROCEDURE — 86850 RBC ANTIBODY SCREEN: CPT

## 2018-05-01 PROCEDURE — 83880 ASSAY OF NATRIURETIC PEPTIDE: CPT

## 2018-05-01 PROCEDURE — 99152 MOD SED SAME PHYS/QHP 5/>YRS: CPT

## 2018-05-01 PROCEDURE — C1887: CPT

## 2018-05-01 PROCEDURE — 82803 BLOOD GASES ANY COMBINATION: CPT

## 2018-05-01 PROCEDURE — 83036 HEMOGLOBIN GLYCOSYLATED A1C: CPT

## 2018-05-01 PROCEDURE — C1725: CPT

## 2018-05-01 PROCEDURE — C1769: CPT

## 2018-05-01 PROCEDURE — 86900 BLOOD TYPING SEROLOGIC ABO: CPT

## 2018-05-01 PROCEDURE — 85730 THROMBOPLASTIN TIME PARTIAL: CPT

## 2018-05-01 PROCEDURE — 71046 X-RAY EXAM CHEST 2 VIEWS: CPT

## 2018-05-01 PROCEDURE — C1874: CPT

## 2018-05-01 PROCEDURE — 82330 ASSAY OF CALCIUM: CPT

## 2018-05-01 PROCEDURE — 83605 ASSAY OF LACTIC ACID: CPT

## 2018-05-01 PROCEDURE — 82553 CREATINE MB FRACTION: CPT

## 2018-05-01 PROCEDURE — 96374 THER/PROPH/DIAG INJ IV PUSH: CPT

## 2018-05-01 PROCEDURE — 85014 HEMATOCRIT: CPT

## 2018-05-01 PROCEDURE — 85027 COMPLETE CBC AUTOMATED: CPT

## 2018-05-01 PROCEDURE — 93571 IV DOP VEL&/PRESS C FLO 1ST: CPT | Mod: LD

## 2018-05-01 PROCEDURE — 82962 GLUCOSE BLOOD TEST: CPT

## 2018-05-01 PROCEDURE — 93458 L HRT ARTERY/VENTRICLE ANGIO: CPT | Mod: 59

## 2018-05-01 PROCEDURE — 85610 PROTHROMBIN TIME: CPT

## 2018-05-01 PROCEDURE — C9600: CPT | Mod: LD

## 2018-05-01 PROCEDURE — 86901 BLOOD TYPING SEROLOGIC RH(D): CPT

## 2018-05-01 PROCEDURE — 80048 BASIC METABOLIC PNL TOTAL CA: CPT

## 2018-05-01 PROCEDURE — C1894: CPT

## 2018-05-01 RX ORDER — POTASSIUM CHLORIDE 20 MEQ
40 PACKET (EA) ORAL ONCE
Qty: 0 | Refills: 0 | Status: COMPLETED | OUTPATIENT
Start: 2018-05-01 | End: 2018-05-01

## 2018-05-01 RX ORDER — ATORVASTATIN CALCIUM 80 MG/1
1 TABLET, FILM COATED ORAL
Qty: 30 | Refills: 0
Start: 2018-05-01 | End: 2018-05-30

## 2018-05-01 RX ORDER — ATORVASTATIN CALCIUM 80 MG/1
2 TABLET, FILM COATED ORAL
Qty: 0 | Refills: 0 | DISCHARGE
Start: 2018-05-01 | End: 2018-05-30

## 2018-05-01 RX ADMIN — AMLODIPINE BESYLATE 2.5 MILLIGRAM(S): 2.5 TABLET ORAL at 05:54

## 2018-05-01 RX ADMIN — Medication 40 MILLIEQUIVALENT(S): at 05:54

## 2018-05-01 RX ADMIN — Medication 81 MILLIGRAM(S): at 05:54

## 2018-05-01 RX ADMIN — Medication 25 MILLIGRAM(S): at 05:54

## 2018-05-01 RX ADMIN — LOSARTAN POTASSIUM 100 MILLIGRAM(S): 100 TABLET, FILM COATED ORAL at 05:54

## 2018-05-01 RX ADMIN — Medication 1 TABLET(S): at 07:37

## 2018-05-01 RX ADMIN — CLOPIDOGREL BISULFATE 75 MILLIGRAM(S): 75 TABLET, FILM COATED ORAL at 05:54

## 2018-05-01 NOTE — CHART NOTE - NSCHARTNOTEFT_GEN_A_CORE
[facility]    Echo can be done as outpt as d/w Dr. Guerra with Dr. Huynh when pt follows up.  D/w Dr. Usman Mary from Cardiology agrees with plan and has no medication changes other than the Atorvastatin 80mg from Zocor.  Pt seen by Dr. Crowder and is cleared for d/c as discussed with her.

## 2018-05-01 NOTE — PROGRESS NOTE ADULT - PROBLEM SELECTOR PLAN 4
controlled  c/w metoprolol, amlodipine and losartan.   hold hctz in setting of dehydration
controlled  c/w metoprolol, amlodipine and losartan.   resume HCTZ on discharge.

## 2018-05-01 NOTE — CHART NOTE - NSCHARTNOTEFT_GEN_A_CORE
Late entry for Monday 4/30/18 @1045    Patient underwent a PCI procedure and is being admitted as they are at increased risk for major adverse cardiac and vascular events if discharged due to the following high risk characteristics:  Pt admitted overnight for ACS for UA was already admitted from ER s/p PCI/BULL x 1 mRCA 85% and BULL x 1 mLAD with +FFR of .79 via RRA.

## 2018-05-01 NOTE — PROGRESS NOTE ADULT - PROBLEM SELECTOR PLAN 1
Chest heaviness w/ n/v in diabetic patient w/ history of CAD s/p 17 stent  - - s/p cardaic cath yesterday, 3 Vessel CAD with new lesions in RCA and LAD. RCA  c/w instent restenosis and de nuvo lession s/p BULL x 2  -cardiac follow up appreciated.   -c/w DAPT with aspirin/plavix, no need to switch for Brilinta  -c/w atorvastatin 80mg qhs, betablocker, ARB  -D/C planning today, time spent coordinating plan 43 minutes.
Chest heaviness w/ n/v in diabetic patient w/ history of CAD s/p 17 stent  - EKG w/o ABDIAZIZ or changes from previous. CE negative  - s/p cardaic cath today, 3 Vessel CAD with new lesions in RCA and LAD. RCA  c/w instent restenosis and de nuvo lession s/p BULL x 2  -will observe overnight, f/u further cardiac recs  -c/w DAPT with aspirin/plavix, no need to switch for Brilinta  -c/w atorvastatin 80mg qhs, betablocker, ARB  -TTE pending  -if develops chest pain, do urgent EKG

## 2018-05-01 NOTE — PROGRESS NOTE ADULT - PROBLEM SELECTOR PLAN 3
lactic acidosis on presentation. unclear of source, likely from cardiac event, resume Metformin on discharge.
lactic acidosis on presentation. unclear of source, possibly from work of recurrent emesis w/ hx of metformin use in setting of unstable angina  -c/w IVF  - monitor lactate in AM, HD stable

## 2018-05-01 NOTE — PROGRESS NOTE ADULT - SUBJECTIVE AND OBJECTIVE BOX
Patient is a 62y old  Male who presents with a chief complaint of N/V, SOB and chest heaviness (30 Apr 2018 19:47) now s/p cardiac cath BULL x 1 mRCA (85%) and BULL x 1 mLAD FFR LAD .79 via right radial artery assess.           Allergies    Levaquin (Joint Pain)    Intolerances        Medications:  amLODIPine   Tablet 2.5 milliGRAM(s) Oral daily  aspirin enteric coated 81 milliGRAM(s) Oral daily  atorvastatin 80 milliGRAM(s) Oral at bedtime  clopidogrel Tablet 75 milliGRAM(s) Oral daily  dextrose 5%. 1000 milliLiter(s) IV Continuous <Continuous>  dextrose 50% Injectable 12.5 Gram(s) IV Push once  dextrose 50% Injectable 25 Gram(s) IV Push once  dextrose 50% Injectable 25 Gram(s) IV Push once  dextrose Gel 1 Dose(s) Oral once PRN  glucagon  Injectable 1 milliGRAM(s) IntraMuscular once PRN  insulin lispro (HumaLOG) corrective regimen sliding scale   SubCutaneous three times a day before meals  insulin lispro (HumaLOG) corrective regimen sliding scale   SubCutaneous at bedtime  losartan 100 milliGRAM(s) Oral daily  metoprolol tartrate 25 milliGRAM(s) Oral two times a day  omega-3-Acid Ethyl Esters 4 Gram(s) Oral daily  potassium acid phosphate/sodium acid phosphate tablet (K-PHOS No. 2) 1 Tablet(s) Oral three times a day with meals  potassium chloride    Tablet ER 40 milliEquivalent(s) Oral once  sodium chloride 0.45%. 1000 milliLiter(s) IV Continuous <Continuous>      Vitals:  T(C): 36.7 (04-30-18 @ 21:19), Max: 36.8 (04-30-18 @ 07:47)  HR: 70 (04-30-18 @ 21:19) (66 - 85)  BP: 156/85 (04-30-18 @ 21:19) (116/58 - 163/84)  BP(mean): --  RR: 16 (04-30-18 @ 21:19) (16 - 18)  SpO2: 95% (04-30-18 @ 21:19) (94% - 100%)  Wt(kg): --  Daily     Daily   I&O's Summary    30 Apr 2018 07:01  -  01 May 2018 05:01  --------------------------------------------------------  IN: 840 mL / OUT: 0 mL / NET: 840 mL          Physical Exam:  Appearance: Normal  Eyes: PERRL, EOMI  HENT: Normal oral muscosa, NC/AT  Cardiovascular: S1S2, RRR, No M/R/G, no JVD, No Lower extremity edema  Procedural Access Site: Right femoral artery. No hematoma, Non-tender to palpation, 2+ pulse, No bruit, No Ecchymosis  Respiratory: Clear to auscultation bilaterally  Gastrointestinal: Soft, Non tender, Normal Bowel Sounds  Musculoskeletal: No clubbing, No joint deformity   Neurologic: Non-focal  Psychiatry: AAOx3, Mood & affect appropriate  Skin: No rashes, No ecchymoses, No cyanosis    05-01    137  |  101  |  11  ----------------------------<  116<H>  3.4<L>   |  24  |  0.81    Ca    8.2<L>      01 May 2018 04:03  Phos  2.0     04-30  Mg     2.1     04-30    TPro  7.6  /  Alb  4.3  /  TBili  0.6  /  DBili  x   /  AST  17  /  ALT  20  /  AlkPhos  86  04-29    PT/INR - ( 29 Apr 2018 08:52 )   PT: 11.8 sec;   INR: 1.09 ratio         PTT - ( 29 Apr 2018 08:52 )  PTT:39.0 sec  CARDIAC MARKERS ( 29 Apr 2018 22:08 )  x     / <0.01 ng/mL / 85 U/L / x     / 1.4 ng/mL  CARDIAC MARKERS ( 29 Apr 2018 16:09 )  x     / <0.01 ng/mL / 127 U/L / x     / x      CARDIAC MARKERS ( 29 Apr 2018 08:52 )  x     / <0.01 ng/mL / 109 U/L / x     / 2.3 ng/mL      Serum Pro-Brain Natriuretic Peptide: 87 pg/mL (04-29 @ 08:52)    Lipid panel   Hgb A1c Hemoglobin A1C, Whole Blood: 6.2 % (04-30 @ 07:46)        Interpretation of Telemetry:      ASSESSMENT/PLAN  Patient is a 62y old  Male who presents with a chief complaint of N/V, SOB and chest heaviness (30 Apr 2018 19:47) now s/p cardiac cath BULL x 1 mRCA (85%) and BULL x 1 mLAD FFR LAD .79 via right radial artery assess. Pt tolerated the procedure well, site benign. K 3.4meQ, Potassium 40Meq given.  TTE pendinig this AM Patient is a 62y old  Male who presents with a chief complaint of N/V, SOB and chest heaviness (30 Apr 2018 19:47) now s/p cardiac cath BULL x 1 mRCA (85%) and BULL x 1 mLAD FFR LAD .79 via right radial artery assess.           Allergies    Levaquin (Joint Pain)    Intolerances        Medications:  amLODIPine   Tablet 2.5 milliGRAM(s) Oral daily  aspirin enteric coated 81 milliGRAM(s) Oral daily  atorvastatin 80 milliGRAM(s) Oral at bedtime  clopidogrel Tablet 75 milliGRAM(s) Oral daily  dextrose 5%. 1000 milliLiter(s) IV Continuous <Continuous>  dextrose 50% Injectable 12.5 Gram(s) IV Push once  dextrose 50% Injectable 25 Gram(s) IV Push once  dextrose 50% Injectable 25 Gram(s) IV Push once  dextrose Gel 1 Dose(s) Oral once PRN  glucagon  Injectable 1 milliGRAM(s) IntraMuscular once PRN  insulin lispro (HumaLOG) corrective regimen sliding scale   SubCutaneous three times a day before meals  insulin lispro (HumaLOG) corrective regimen sliding scale   SubCutaneous at bedtime  losartan 100 milliGRAM(s) Oral daily  metoprolol tartrate 25 milliGRAM(s) Oral two times a day  omega-3-Acid Ethyl Esters 4 Gram(s) Oral daily  potassium acid phosphate/sodium acid phosphate tablet (K-PHOS No. 2) 1 Tablet(s) Oral three times a day with meals  potassium chloride    Tablet ER 40 milliEquivalent(s) Oral once  sodium chloride 0.45%. 1000 milliLiter(s) IV Continuous <Continuous>      Vitals:  T(C): 36.7 (04-30-18 @ 21:19), Max: 36.8 (04-30-18 @ 07:47)  HR: 70 (04-30-18 @ 21:19) (66 - 85)  BP: 156/85 (04-30-18 @ 21:19) (116/58 - 163/84)  BP(mean): --  RR: 16 (04-30-18 @ 21:19) (16 - 18)  SpO2: 95% (04-30-18 @ 21:19) (94% - 100%)  Wt(kg): --  Daily     Daily   I&O's Summary    30 Apr 2018 07:01  -  01 May 2018 05:01  --------------------------------------------------------  IN: 840 mL / OUT: 0 mL / NET: 840 mL          Physical Exam:  Appearance: Normal  Eyes: PERRL, EOMI  HENT: Normal oral muscosa, NC/AT  Cardiovascular: S1S2, RRR, No M/R/G, no JVD, No Lower extremity edema  Procedural Access Site: Right femoral artery. No hematoma, Non-tender to palpation, 2+ pulse, No bruit, No Ecchymosis  Respiratory: Clear to auscultation bilaterally  Gastrointestinal: Soft, Non tender, Normal Bowel Sounds  Musculoskeletal: No clubbing, No joint deformity   Neurologic: Non-focal  Psychiatry: AAOx3, Mood & affect appropriate  Skin: No rashes, No ecchymoses, No cyanosis    05-01    137  |  101  |  11  ----------------------------<  116<H>  3.4<L>   |  24  |  0.81    Ca    8.2<L>      01 May 2018 04:03  Phos  2.0     04-30  Mg     2.1     04-30    TPro  7.6  /  Alb  4.3  /  TBili  0.6  /  DBili  x   /  AST  17  /  ALT  20  /  AlkPhos  86  04-29    PT/INR - ( 29 Apr 2018 08:52 )   PT: 11.8 sec;   INR: 1.09 ratio         PTT - ( 29 Apr 2018 08:52 )  PTT:39.0 sec  CARDIAC MARKERS ( 29 Apr 2018 22:08 )  x     / <0.01 ng/mL / 85 U/L / x     / 1.4 ng/mL  CARDIAC MARKERS ( 29 Apr 2018 16:09 )  x     / <0.01 ng/mL / 127 U/L / x     / x      CARDIAC MARKERS ( 29 Apr 2018 08:52 )  x     / <0.01 ng/mL / 109 U/L / x     / 2.3 ng/mL      Serum Pro-Brain Natriuretic Peptide: 87 pg/mL (04-29 @ 08:52)    Lipid panel   Hgb A1c Hemoglobin A1C, Whole Blood: 6.2 % (04-30 @ 07:46)        Interpretation of Telemetry: 60-80bpm      ASSESSMENT/PLAN  Patient is a 62y old  Male who presents with a chief complaint of N/V, SOB and chest heaviness (30 Apr 2018 19:47) now s/p cardiac cath BULL x 1 mRCA (85%) and BULL x 1 mLAD FFR LAD .79 via right radial artery assess. Pt tolerated the procedure well, site benign. K 3.4meQ, Potassium 40Meq given.  TTE pendinig this AM

## 2018-05-01 NOTE — PROGRESS NOTE ADULT - PROBLEM SELECTOR PLAN 9
restart HSQ for dvt ppx when ok with cards post cath, ambulatory otherwise
restart HSQ for dvt ppx when ok with cards post cath, ambulatory otherwise

## 2018-05-01 NOTE — PROGRESS NOTE ADULT - PROBLEM SELECTOR PLAN 8
Noted morbid obesity.  - additionally would benefit from outpatient sleep study given BMI and hx of snoring  -weight loss diet encouraged
Noted morbid obesity.  - additionally would benefit from outpatient sleep study given BMI and hx of snoring  -weight loss diet encouraged

## 2018-05-01 NOTE — PROGRESS NOTE ADULT - ASSESSMENT
62M w/ CAD w/ hx of MI s/p DESx17 (last 2015 to LAD), Former Smoker (40 pack year, quit 2009), DM2 on metformin, HTN, morbid obesity, and gout presents to [facility] for hours of N/V, sob and chest heaviness and admitted to medicine for evaluation for N/V w/ chest discomfort concerning for unstable angina equivalent.
62M w/ CAD w/ hx of MI s/p DESx17 (last 2015 to LAD), Former Smoker (40 pack year, quit 2009), DM2 on metformin, HTN, morbid obesity, and gout presents to [facility] for hours of N/V, sob and chest heaviness and admitted to medicine for evaluation for N/V w/ chest discomfort concerning for unstable angina equivalent.

## 2018-05-01 NOTE — PROGRESS NOTE ADULT - PROBLEM SELECTOR PLAN 6
FS controlled, a1c 6.2  -c/w ARCENIO   -monitor FS  -restart home metformin on discharge If lactate improved
FS controlled, a1c 6.2  -c/w ARCENIO   -monitor FS  -restart home metformin on discharge If lactate improved

## 2018-05-01 NOTE — PROGRESS NOTE ADULT - SUBJECTIVE AND OBJECTIVE BOX
Patient is a 62y old  Male who presents with a chief complaint of hours of N/V, SOB and chest heaviness (29 Apr 2018 12:47)      SUBJECTIVE / OVERNIGHT EVENTS: Patient feels ok, anxious to go home. No overnight events. ROS otherwise negative.       T(C): 36.7 (05-01-18 @ 11:09), Max: 37 (05-01-18 @ 05:59)  HR: 68 (05-01-18 @ 11:09) (68 - 76)  BP: 156/91 (05-01-18 @ 11:09) (151/74 - 156/91)  RR: 17 (05-01-18 @ 11:09) (17 - 17)  SpO2: 97% (05-01-18 @ 11:09) (96% - 97%)    MEDICATIONS  (STANDING):  amLODIPine   Tablet 2.5 milliGRAM(s) Oral daily  aspirin enteric coated 81 milliGRAM(s) Oral daily  atorvastatin 80 milliGRAM(s) Oral at bedtime  clopidogrel Tablet 75 milliGRAM(s) Oral daily  dextrose 5%. 1000 milliLiter(s) (50 mL/Hr) IV Continuous <Continuous>  dextrose 50% Injectable 12.5 Gram(s) IV Push once  dextrose 50% Injectable 25 Gram(s) IV Push once  dextrose 50% Injectable 25 Gram(s) IV Push once  insulin lispro (HumaLOG) corrective regimen sliding scale   SubCutaneous three times a day before meals  insulin lispro (HumaLOG) corrective regimen sliding scale   SubCutaneous at bedtime  losartan 100 milliGRAM(s) Oral daily  metoprolol tartrate 25 milliGRAM(s) Oral two times a day  omega-3-Acid Ethyl Esters 4 Gram(s) Oral daily  potassium acid phosphate/sodium acid phosphate tablet (K-PHOS No. 2) 1 Tablet(s) Oral three times a day with meals  sodium chloride 0.45%. 1000 milliLiter(s) (50 mL/Hr) IV Continuous <Continuous>    MEDICATIONS  (PRN):  dextrose Gel 1 Dose(s) Oral once PRN Blood Glucose LESS THAN 70 milliGRAM(s)/deciliter  glucagon  Injectable 1 milliGRAM(s) IntraMuscular once PRN Glucose LESS THAN 70 milligrams/deciliter      CAPILLARY BLOOD GLUCOSE      CAPILLARY BLOOD GLUCOSE      POCT Blood Glucose.: 104 mg/dL (01 May 2018 11:19)  POCT Blood Glucose.: 112 mg/dL (01 May 2018 07:11)  POCT Blood Glucose.: 113 mg/dL (30 Apr 2018 21:21)  POCT Blood Glucose.: 97 mg/dL (30 Apr 2018 17:38)    I&O's Summary    30 Apr 2018 07:01  -  30 Apr 2018 13:54  --------------------------------------------------------  IN: 360 mL / OUT: 0 mL / NET: 360 mL        PHYSICAL EXAM:  GENERAL: NAD, well-developed  HEAD:  Atraumatic, Normocephalic  NECK: Supple, No JVD  CHEST/LUNG: Clear to auscultation bilaterally; No wheeze  HEART: Regular rate and rhythm; No murmurs,  ABDOMEN: Soft, Nontender, Nondistended; Bowel sounds present  EXTREMITIES:  2+ Peripheral Pulses, No clubbing, cyanosis, or edema. R radial cath band intact  PSYCH: AAOx3  NEUROLOGY: non-focal                        13.1   6.1   )-----------( 182      ( 01 May 2018 04:03 )             37.5       CARDIAC MARKERS ( 29 Apr 2018 22:08 )  x     / <0.01 ng/mL / 85 U/L / x     / 1.4 ng/mL  CARDIAC MARKERS ( 29 Apr 2018 16:09 )  x     / <0.01 ng/mL / 127 U/L / x     / x              137|101|11<116  3.4|24|0.81  8.2,--,--  05-01 @ 04:03      RADIOLOGY & ADDITIONAL TESTS:    Imaging Personally Reviewed: cardiac cath: DIAGNOSTIC IMPRESSIONS: 3 Vessel CAD with new lessions in RCA and LAD. RCA  c/w instent restenosis. LAD de nuvo lession. Low NL LV FX with inferior  and inferolateral hypokenisis.  DIAGNOSTIC RECOMMENDATIONS: PCI of RCA and FFR of LAD and if significant  then PCI of LAD.  INTERVENTIONAL IMPRESSIONS: Successful PCI of RCA, +FFR or LAD and  Successful PCI of LAD.      Consultant(s) Notes Reviewed:  cards    Care Discussed with Consultants/Other Providers:

## 2018-05-07 ENCOUNTER — APPOINTMENT (OUTPATIENT)
Dept: CARDIOLOGY | Facility: CLINIC | Age: 63
End: 2018-05-07
Payer: COMMERCIAL

## 2018-05-07 VITALS — DIASTOLIC BLOOD PRESSURE: 76 MMHG | OXYGEN SATURATION: 97 % | SYSTOLIC BLOOD PRESSURE: 146 MMHG | HEART RATE: 80 BPM

## 2018-05-07 VITALS — HEIGHT: 73 IN | BODY MASS INDEX: 37.77 KG/M2 | WEIGHT: 285 LBS

## 2018-05-07 PROCEDURE — 93000 ELECTROCARDIOGRAM COMPLETE: CPT

## 2018-05-07 PROCEDURE — 99214 OFFICE O/P EST MOD 30 MIN: CPT | Mod: 25

## 2018-05-09 ENCOUNTER — NON-APPOINTMENT (OUTPATIENT)
Age: 63
End: 2018-05-09

## 2018-05-30 ENCOUNTER — APPOINTMENT (OUTPATIENT)
Dept: CARDIOLOGY | Facility: CLINIC | Age: 63
End: 2018-05-30
Payer: COMMERCIAL

## 2018-05-30 PROCEDURE — 93306 TTE W/DOPPLER COMPLETE: CPT

## 2018-05-30 PROCEDURE — 93015 CV STRESS TEST SUPVJ I&R: CPT

## 2018-06-14 ENCOUNTER — NON-APPOINTMENT (OUTPATIENT)
Age: 63
End: 2018-06-14

## 2018-06-14 ENCOUNTER — APPOINTMENT (OUTPATIENT)
Dept: CARDIOLOGY | Facility: CLINIC | Age: 63
End: 2018-06-14
Payer: COMMERCIAL

## 2018-06-14 VITALS
OXYGEN SATURATION: 98 % | SYSTOLIC BLOOD PRESSURE: 168 MMHG | HEIGHT: 73 IN | DIASTOLIC BLOOD PRESSURE: 98 MMHG | WEIGHT: 295 LBS | BODY MASS INDEX: 39.1 KG/M2 | HEART RATE: 64 BPM

## 2018-06-14 PROCEDURE — 99215 OFFICE O/P EST HI 40 MIN: CPT | Mod: 25

## 2018-06-14 PROCEDURE — 93000 ELECTROCARDIOGRAM COMPLETE: CPT

## 2018-06-21 ENCOUNTER — APPOINTMENT (OUTPATIENT)
Dept: CARDIOLOGY | Facility: CLINIC | Age: 63
End: 2018-06-21
Payer: COMMERCIAL

## 2018-06-21 ENCOUNTER — NON-APPOINTMENT (OUTPATIENT)
Age: 63
End: 2018-06-21

## 2018-06-21 VITALS — BODY MASS INDEX: 38.7 KG/M2 | WEIGHT: 292 LBS | HEIGHT: 73 IN

## 2018-06-21 VITALS — HEART RATE: 60 BPM | SYSTOLIC BLOOD PRESSURE: 162 MMHG | OXYGEN SATURATION: 95 % | DIASTOLIC BLOOD PRESSURE: 90 MMHG

## 2018-06-21 PROCEDURE — 99214 OFFICE O/P EST MOD 30 MIN: CPT | Mod: 25

## 2018-06-21 PROCEDURE — 93000 ELECTROCARDIOGRAM COMPLETE: CPT

## 2018-07-07 ENCOUNTER — RX RENEWAL (OUTPATIENT)
Age: 63
End: 2018-07-07

## 2018-07-12 ENCOUNTER — RX RENEWAL (OUTPATIENT)
Age: 63
End: 2018-07-12

## 2018-08-03 ENCOUNTER — RX RENEWAL (OUTPATIENT)
Age: 63
End: 2018-08-03

## 2018-08-03 DIAGNOSIS — Z00.00 ENCOUNTER FOR GENERAL ADULT MEDICAL EXAMINATION W/OUT ABNORMAL FINDINGS: ICD-10-CM

## 2018-09-04 ENCOUNTER — MEDICATION RENEWAL (OUTPATIENT)
Age: 63
End: 2018-09-04

## 2018-09-20 ENCOUNTER — NON-APPOINTMENT (OUTPATIENT)
Age: 63
End: 2018-09-20

## 2018-09-20 ENCOUNTER — APPOINTMENT (OUTPATIENT)
Dept: CARDIOLOGY | Facility: CLINIC | Age: 63
End: 2018-09-20
Payer: COMMERCIAL

## 2018-09-20 VITALS
BODY MASS INDEX: 37.91 KG/M2 | HEIGHT: 73 IN | HEART RATE: 72 BPM | DIASTOLIC BLOOD PRESSURE: 83 MMHG | SYSTOLIC BLOOD PRESSURE: 153 MMHG | OXYGEN SATURATION: 96 % | WEIGHT: 286 LBS

## 2018-09-20 PROBLEM — E11.9 TYPE 2 DIABETES MELLITUS WITHOUT COMPLICATIONS: Chronic | Status: ACTIVE | Noted: 2017-06-25

## 2018-09-20 PROCEDURE — 93000 ELECTROCARDIOGRAM COMPLETE: CPT

## 2018-09-20 PROCEDURE — 99214 OFFICE O/P EST MOD 30 MIN: CPT | Mod: 25

## 2018-09-20 RX ORDER — ERGOCALCIFEROL 1.25 MG/1
1.25 MG CAPSULE, LIQUID FILLED ORAL
Qty: 12 | Refills: 0 | Status: DISCONTINUED | COMMUNITY
Start: 2018-07-12 | End: 2018-09-20

## 2018-11-16 LAB
25(OH)D3 SERPL-MCNC: 22.8 NG/ML
ALBUMIN SERPL ELPH-MCNC: 4.4 G/DL
ALP BLD-CCNC: 105 U/L
ALT SERPL-CCNC: 20 U/L
ANION GAP SERPL CALC-SCNC: 14 MMOL/L
AST SERPL-CCNC: 12 U/L
BASOPHILS # BLD AUTO: 0.06 K/UL
BASOPHILS NFR BLD AUTO: 0.9 %
BILIRUB SERPL-MCNC: 0.7 MG/DL
BUN SERPL-MCNC: 14 MG/DL
CALCIUM SERPL-MCNC: 9.4 MG/DL
CHLORIDE SERPL-SCNC: 100 MMOL/L
CHOLEST SERPL-MCNC: 221 MG/DL
CHOLEST/HDLC SERPL: 5.8 RATIO
CK SERPL-CCNC: 118 U/L
CO2 SERPL-SCNC: 24 MMOL/L
CREAT SERPL-MCNC: 0.86 MG/DL
EOSINOPHIL # BLD AUTO: 0.18 K/UL
EOSINOPHIL NFR BLD AUTO: 2.6 %
GLUCOSE SERPL-MCNC: 108 MG/DL
HBA1C MFR BLD HPLC: 6.8 %
HCT VFR BLD CALC: 40.6 %
HDLC SERPL-MCNC: 38 MG/DL
HGB BLD-MCNC: 13.9 G/DL
IMM GRANULOCYTES NFR BLD AUTO: 0.3 %
LDLC SERPL CALC-MCNC: 151 MG/DL
LYMPHOCYTES # BLD AUTO: 1.66 K/UL
LYMPHOCYTES NFR BLD AUTO: 23.7 %
MAGNESIUM SERPL-MCNC: 2 MG/DL
MAN DIFF?: NORMAL
MCHC RBC-ENTMCNC: 28.5 PG
MCHC RBC-ENTMCNC: 34.2 GM/DL
MCV RBC AUTO: 83.4 FL
MONOCYTES # BLD AUTO: 0.5 K/UL
MONOCYTES NFR BLD AUTO: 7.2 %
NEUTROPHILS # BLD AUTO: 4.57 K/UL
NEUTROPHILS NFR BLD AUTO: 65.3 %
PLATELET # BLD AUTO: 254 K/UL
POTASSIUM SERPL-SCNC: 4.2 MMOL/L
PROT SERPL-MCNC: 6.5 G/DL
RBC # BLD: 4.87 M/UL
RBC # FLD: 14.4 %
SODIUM SERPL-SCNC: 138 MMOL/L
T3 SERPL-MCNC: 115 NG/DL
T4 SERPL-MCNC: 8 UG/DL
TRIGL SERPL-MCNC: 161 MG/DL
TSH SERPL-ACNC: 1.97 UIU/ML
WBC # FLD AUTO: 6.99 K/UL

## 2018-11-29 ENCOUNTER — RESULT CHARGE (OUTPATIENT)
Age: 63
End: 2018-11-29

## 2018-11-30 ENCOUNTER — NON-APPOINTMENT (OUTPATIENT)
Age: 63
End: 2018-11-30

## 2018-11-30 ENCOUNTER — APPOINTMENT (OUTPATIENT)
Dept: CARDIOLOGY | Facility: CLINIC | Age: 63
End: 2018-11-30
Payer: COMMERCIAL

## 2018-11-30 VITALS — OXYGEN SATURATION: 100 % | HEART RATE: 67 BPM | SYSTOLIC BLOOD PRESSURE: 166 MMHG | DIASTOLIC BLOOD PRESSURE: 93 MMHG

## 2018-11-30 VITALS — HEIGHT: 73 IN | WEIGHT: 290 LBS | BODY MASS INDEX: 38.43 KG/M2

## 2018-11-30 PROCEDURE — 99214 OFFICE O/P EST MOD 30 MIN: CPT | Mod: 25

## 2018-11-30 PROCEDURE — 93000 ELECTROCARDIOGRAM COMPLETE: CPT

## 2018-11-30 NOTE — DISCUSSION/SUMMARY
[FreeTextEntry1] : This is a 63 Y/O gentleman PMH: HTN, HLD, CAD/Multivessel stenting, DM followed with Endocrinology, Obesity who presents today in routine cardiac follow up with a CC of CAGE alleviated with rest W/O associated CP/Palpitations/Dizziness/syncope.  He is overweight, does not exercise perhaps element of  deconditioning\par \par Plan:\par Will order nuclear stress test\par Prior echo reviewed NL LV FX\par CBC reviewed ( No Anemia ) \par Pulmonary consult\par Sleep study ordered \par Advised significance of weight reduction, low sodium diet

## 2018-11-30 NOTE — PHYSICAL EXAM
[General Appearance - Well Developed] : well developed [Normal Appearance] : normal appearance [Well Groomed] : well groomed [General Appearance - Well Nourished] : well nourished [No Deformities] : no deformities [General Appearance - In No Acute Distress] : no acute distress [Normal Conjunctiva] : the conjunctiva exhibited no abnormalities [] : no respiratory distress [Respiration, Rhythm And Depth] : normal respiratory rhythm and effort [Exaggerated Use Of Accessory Muscles For Inspiration] : no accessory muscle use [Auscultation Breath Sounds / Voice Sounds] : lungs were clear to auscultation bilaterally [Heart Rate And Rhythm] : heart rate and rhythm were normal [Heart Sounds] : normal S1 and S2 [Murmurs] : no murmurs present [Abdomen Soft] : soft [Abnormal Walk] : normal gait [Skin Color & Pigmentation] : normal skin color and pigmentation [Oriented To Time, Place, And Person] : oriented to person, place, and time [FreeTextEntry1] : No LE Edema

## 2018-11-30 NOTE — HISTORY OF PRESENT ILLNESS
[FreeTextEntry1] : This is a 63 Y/O gentleman PMH: HTN, HLD, CAD/Multivessel stenting, DM followed with Endocrinology, Obesity who presents today in routine cardiac follow up with a CC of CAGE alleviated with rest W/O associated CP/Palpitations/Dizziness/syncope.  he is overweight, does not exercise perhaps element of  deconditioning.  \par \par EKG reviewed no acute ST changes\par VSS\par \par Had Cath 4/2018\par Stress/Echo 5/2018\par Carotid [id number] 2015\par Labs 1/2018 \par \par  \par \par

## 2018-12-14 ENCOUNTER — APPOINTMENT (OUTPATIENT)
Dept: CARDIOLOGY | Facility: CLINIC | Age: 63
End: 2018-12-14
Payer: COMMERCIAL

## 2018-12-14 PROCEDURE — 93978 VASCULAR STUDY: CPT

## 2018-12-18 ENCOUNTER — RX RENEWAL (OUTPATIENT)
Age: 63
End: 2018-12-18

## 2018-12-20 ENCOUNTER — APPOINTMENT (OUTPATIENT)
Dept: CARDIOLOGY | Facility: CLINIC | Age: 63
End: 2018-12-20

## 2018-12-31 ENCOUNTER — RX RENEWAL (OUTPATIENT)
Age: 63
End: 2018-12-31

## 2019-01-18 ENCOUNTER — APPOINTMENT (OUTPATIENT)
Dept: CARDIOLOGY | Facility: CLINIC | Age: 64
End: 2019-01-18

## 2019-01-18 NOTE — ED ADULT TRIAGE NOTE - ESI TRIAGE ACUITY LEVEL, MLM
You have pulmonary a nodules on your CT scan of your chest   you will need a repeat CT scan of year chest in 12 months        Acute Bronchitis   WHAT YOU NEED TO KNOW:   Acute bronchitis is swelling and irritation in the air passages of your lungs  This irritation may cause you to cough or have other breathing problems  Acute bronchitis often starts because of another illness, such as a cold or the flu  The illness spreads from your nose and throat to your windpipe and airways  Bronchitis is often called a chest cold  Acute bronchitis lasts about 3 to 6 weeks and is usually not a serious illness  Your cough can last for several weeks  DISCHARGE INSTRUCTIONS:   Return to the emergency department if:   · You cough up blood  · Your lips or fingernails turn blue  · You feel like you are not getting enough air when you breathe  Contact your healthcare provider if:   · You have a fever  · Your breathing problems do not go away or get worse  · Your cough does not get better within 4 weeks  · You have questions or concerns about your condition or care  Self-care:   · Get more rest   Rest helps your body to heal  Slowly start to do more each day  Rest when you feel it is needed  · Avoid irritants in the air  Avoid chemicals, fumes, and dust  Wear a face mask if you must work around dust or fumes  Stay inside on days when air pollution levels are high  If you have allergies, stay inside when pollen counts are high  Do not use aerosol products, such as spray-on deodorant, bug spray, and hair spray  · Do not smoke or be around others who smoke  Nicotine and other chemicals in cigarettes and cigars damages the cilia that move mucus out of your lungs  Ask your healthcare provider for information if you currently smoke and need help to quit  E-cigarettes or smokeless tobacco still contain nicotine  Talk to your healthcare provider before you use these products  · Drink liquids as directed    Liquids help keep your air passages moist and help you cough up mucus  You may need to drink more liquids when you have acute bronchitis  Ask how much liquid to drink each day and which liquids are best for you  · Use a humidifier or vaporizer  Use a cool mist humidifier or a vaporizer to increase air moisture in your home  This may make it easier for you to breathe and help decrease your cough  Decrease risk for acute bronchitis:   · Get the vaccinations you need  Ask your healthcare provider if you should get vaccinated against the flu or pneumonia  · Prevent the spread of germs  You can decrease your risk of acute bronchitis and other illnesses by doing the following:     [facility] your hands often with soap and water  Carry germ-killing hand lotion or gel with you  You can use the lotion or gel to clean your hands when soap and water are not available  ¨ Do not touch your eyes, nose, or mouth unless you have washed your hands first     ¨ Always cover your mouth when you cough to prevent the spread of germs  It is best to cough into a tissue or your shirt sleeve instead of into your hand  Ask those around you cover their mouths when they cough  ¨ Try to avoid people who have a cold or the flu  If you are sick, stay away from others as much as possible  Medicines: Your healthcare provider may  give you any of the following:  · Ibuprofen or acetaminophen  are medicines that help lower your fever  They are available without a doctor's order  Ask your healthcare provider which medicine is right for you  Ask how much to take and how often to take it  Follow directions  These medicines can cause stomach bleeding if not taken correctly  Ibuprofen can cause kidney damage  Do not take ibuprofen if you have kidney disease, an ulcer, or allergies to aspirin  Acetaminophen can cause liver damage  Do not take more than 4,000 milligrams in 24 hours       · Decongestants  help loosen mucus in your lungs and make it easier to cough up  This can help you breathe easier  · Cough suppressants  decrease your urge to cough  If your cough produces mucus, do not take a cough suppressant unless your healthcare provider tells you to  Your healthcare provider may suggest that you take a cough suppressant at night so you can rest     · Inhalers  may be given  Your healthcare provider may give you one or more inhalers to help you breathe easier and cough less  An inhaler gives your medicine to open your airways  Ask your healthcare provider to show you how to use your inhaler correctly  · Take your medicine as directed  Contact your healthcare provider if you think your medicine is not helping or if you have side effects  Tell him of her if you are allergic to any medicine  Keep a list of the medicines, vitamins, and herbs you take  Include the amounts, and when and why you take them  Bring the list or the pill bottles to follow-up visits  Carry your medicine list with you in case of an emergency  Follow up with your healthcare provider as directed:  Write down questions you have so you will remember to ask them during your follow-up visits  © 2017 [employer]  Information is for End User's use only and may not be sold, redistributed or otherwise used for commercial purposes  All illustrations and images included in CareNotes® are the copyrighted property of [employer]  or [employer]  The above information is an  only  It is not intended as medical advice for individual conditions or treatments  Talk to your doctor, nurse or pharmacist before following any medical regimen to see if it is safe and effective for you  Bronchospasm   WHAT YOU NEED TO KNOW:   Bronchospasm is a narrowing of the airway that usually comes and goes  You may be at risk for bronchospasm if you have a chest cold or allergies   You may also be at risk if you are bothered by air pollution, certain medicines, cold, dry air, smoke, or strong odors  Exercise may worsen your symptoms  Bronchospasms may make it hard for you to breathe  DISCHARGE INSTRUCTIONS:   Medicines: You may need any of the following:  · Bronchodilators  help expand your airway for easier breathing  Some of these medicines may help prevent future spasms  · Inhaled steroids  help reduce swelling in your airway and soothe your breathing  These are used for long-term control  · Anticholinergics  help relax and open your airway  · Take your medicine as directed  Contact your healthcare provider if you think your medicine is not helping or if you have side effects  Tell him of her if you are allergic to any medicine  Keep a list of the medicines, vitamins, and herbs you take  Include the amounts, and when and why you take them  Bring the list or the pill bottles to follow-up visits  Carry your medicine list with you in case of an emergency  Follow up with your healthcare provider as directed: You may need more tests to find the cause of your condition  Write down your questions so you remember to ask them during your visits  Self-care:   · Avoid triggers  · Warm up before you exercise  Ask your healthcare provider about the best exercise plan for you  · Try to avoid people who are sick  Ask your healthcare provider if you need a flu or pneumonia vaccine  · Breathe through your nose when you are in cold, dry air or weather  This may help reduce lung irritation by warming the air before it reaches your lungs  Contact your healthcare provider if:   · You have a fever  · You have a cough that will not go away  · Your wheezing worsens  · You have questions or concerns about your condition or care  Return to the emergency department if:   · You cough or spit up blood  · You have trouble breathing  · You have blue fingernails or toenails  · You have chest pain  · You have a fast or uneven heartbeat    © 2017 [facility] [address] is for End User's use only and may not be sold, redistributed or otherwise used for commercial purposes  All illustrations and images included in CareNotes® are the copyrighted property of [employer] M [employer] Inc  or [employer]  The above information is an  only  It is not intended as medical advice for individual conditions or treatments  Talk to your doctor, nurse or pharmacist before following any medical regimen to see if it is safe and effective for you  Pulmonary Nodules   WHAT YOU NEED TO KNOW:   Pulmonary nodules are areas of abnormal tissue in your lungs  You may not have any symptoms, or you may have chest tightness, a cough, chest pain, or shortness of breath  Nodules are usually found with an x-ray or CT scan  Most nodules are not cancerous  However, it is still important for you to return for follow-up testing to monitor your condition  DISCHARGE INSTRUCTIONS:   Call 911 for any of the following:   · You have severe shortness of breath or trouble breathing  · Your lips or nails look blue or pale  Return to the emergency department if:   · You cough up blood  · You suddenly feel lightheaded or are short of breath  · You have chest pain when you take a deep breath or cough  · You cannot think clearly  Contact your healthcare provider if:   · Your symptoms do not improve  · You have new symptoms  · You have questions or concerns about your condition or care  Follow up with your healthcare provider:  Your healthcare provider will refer you to a pulmonologist  Your pulmonologist will monitor your nodules for any change or growth  Nodules that grow quickly may require a biopsy to check for cancer  You may need to be monitored for 1 to 3 years  Write down your questions so you remember to ask them during your visits  Do not smoke:  Nicotine and other chemicals in cigarettes and cigars can cause lung damage or cancer   Stay away from others who smoke  Ask your healthcare provider for information if you or someone close to you currently smokes and needs help to quit  E-cigarettes or smokeless tobacco still contain nicotine  Talk to your healthcare provider before you use these products  © 2017 [address] [employer] Information is for End User's use only and may not be sold, redistributed or otherwise used for commercial purposes  All illustrations and images included in CareNotes® are the copyrighted property of A [employer] M , Inc  or [employer]  The above information is an  only  It is not intended as medical advice for individual conditions or treatments  Talk to your doctor, nurse or pharmacist before following any medical regimen to see if it is safe and effective for you  2

## 2019-01-30 ENCOUNTER — MEDICATION RENEWAL (OUTPATIENT)
Age: 64
End: 2019-01-30

## 2019-03-04 ENCOUNTER — RX RENEWAL (OUTPATIENT)
Age: 64
End: 2019-03-04

## 2019-03-05 ENCOUNTER — RX RENEWAL (OUTPATIENT)
Age: 64
End: 2019-03-05

## 2019-04-17 ENCOUNTER — APPOINTMENT (OUTPATIENT)
Dept: CARDIOLOGY | Facility: CLINIC | Age: 64
End: 2019-04-17
Payer: COMMERCIAL

## 2019-04-17 ENCOUNTER — NON-APPOINTMENT (OUTPATIENT)
Age: 64
End: 2019-04-17

## 2019-04-17 VITALS
HEART RATE: 84 BPM | HEIGHT: 73 IN | WEIGHT: 310 LBS | OXYGEN SATURATION: 99 % | DIASTOLIC BLOOD PRESSURE: 84 MMHG | SYSTOLIC BLOOD PRESSURE: 146 MMHG | BODY MASS INDEX: 41.08 KG/M2

## 2019-04-17 DIAGNOSIS — Z87.898 PERSONAL HISTORY OF OTHER SPECIFIED CONDITIONS: ICD-10-CM

## 2019-04-17 DIAGNOSIS — I11.9 HYPERTENSIVE HEART DISEASE W/OUT HEART FAILURE: ICD-10-CM

## 2019-04-17 DIAGNOSIS — R06.09 OTHER FORMS OF DYSPNEA: ICD-10-CM

## 2019-04-17 DIAGNOSIS — M25.473 EFFUSION, UNSPECIFIED ANKLE: ICD-10-CM

## 2019-04-17 DIAGNOSIS — R07.89 OTHER CHEST PAIN: ICD-10-CM

## 2019-04-17 PROCEDURE — 99215 OFFICE O/P EST HI 40 MIN: CPT | Mod: 25

## 2019-04-17 PROCEDURE — 93000 ELECTROCARDIOGRAM COMPLETE: CPT

## 2019-04-25 ENCOUNTER — RX RENEWAL (OUTPATIENT)
Age: 64
End: 2019-04-25

## 2019-04-30 ENCOUNTER — RX RENEWAL (OUTPATIENT)
Age: 64
End: 2019-04-30

## 2019-05-02 PROBLEM — R06.09 DOE (DYSPNEA ON EXERTION): Status: RESOLVED | Noted: 2018-01-23 | Resolved: 2019-05-02

## 2019-05-02 PROBLEM — R07.89 ATYPICAL CHEST PAIN: Status: RESOLVED | Noted: 2017-05-22 | Resolved: 2019-05-02

## 2019-05-02 NOTE — HISTORY OF PRESENT ILLNESS
[FreeTextEntry1] : Presents with c/o of CP and exertional SOB for last 3-4 weeks Denies palpitations, orthopnea, paroxysmal nocturnal dyspnea, claudication, dizziness,  lightheadedness, presyncopal, or syncopal symptoms.\par .

## 2019-05-02 NOTE — CARDIOLOGY SUMMARY
[Normal] : normal [No Ischemia] : no Ischemia [___] : [unfilled] [LVEF ___%] : LVEF [unfilled]% [Mild] : mild mitral regurgitation

## 2019-05-02 NOTE — DISCUSSION/SUMMARY
[FreeTextEntry1] : Chest pain/CAD/Prior PCI: Start Ranexa and titrate metoprolol.  If not better, may need cath at f/u.\par HTN: Suboptimal control. Increase metoprolol. Low Sodium diet discussed.\par Hyperlipidemia: Blood work at followup.  Low fat diet.\par F/u in 2 weeks.

## 2019-05-02 NOTE — REASON FOR VISIT
[Follow-Up - Clinic] : a clinic follow-up of [Coronary Artery Disease] : coronary artery disease [Dyspnea] : dyspnea [Hyperlipidemia] : hyperlipidemia [Hypertension] : hypertension

## 2019-05-02 NOTE — PHYSICAL EXAM
[General Appearance - Well Nourished] : well nourished [General Appearance - Well Developed] : well developed [Normal Oral Mucosa] : normal oral mucosa [Normal Conjunctiva] : the conjunctiva exhibited no abnormalities [Normal Jugular Venous A Waves Present] : normal jugular venous A waves present [Normal Oropharynx] : normal oropharynx [Normal Jugular Venous V Waves Present] : normal jugular venous V waves present [5th Left ICS - MCL] : palpated at the 5th LICS in the midclavicular line [Auscultation Breath Sounds / Voice Sounds] : lungs were clear to auscultation bilaterally [Normal] : normal [Rhythm Regular] : regular [Normal S1] : normal S1 [I] : a grade 1 [Normal S2] : normal S2 [2+] : left 2+ [No Pitting Edema] : no pitting edema present [Bowel Sounds] : normal bowel sounds [Abdomen Soft] : soft [Abnormal Walk] : normal gait [Nail Clubbing] : no clubbing of the fingernails [] : no rash [Cyanosis, Localized] : no localized cyanosis [Oriented To Time, Place, And Person] : oriented to person, place, and time [No Anxiety] : not feeling anxious

## 2019-05-03 ENCOUNTER — APPOINTMENT (OUTPATIENT)
Dept: CARDIOLOGY | Facility: CLINIC | Age: 64
End: 2019-05-03
Payer: COMMERCIAL

## 2019-05-03 VITALS
OXYGEN SATURATION: 99 % | BODY MASS INDEX: 39.76 KG/M2 | SYSTOLIC BLOOD PRESSURE: 175 MMHG | HEIGHT: 73 IN | DIASTOLIC BLOOD PRESSURE: 95 MMHG | WEIGHT: 300 LBS | HEART RATE: 57 BPM

## 2019-05-03 PROCEDURE — 99214 OFFICE O/P EST MOD 30 MIN: CPT | Mod: 25

## 2019-05-03 PROCEDURE — 85610 PROTHROMBIN TIME: CPT | Mod: QW

## 2019-05-03 PROCEDURE — 93000 ELECTROCARDIOGRAM COMPLETE: CPT

## 2019-05-03 PROCEDURE — 36415 COLL VENOUS BLD VENIPUNCTURE: CPT

## 2019-05-03 NOTE — H&P ADULT - NSHPPHYSICALEXAM_GEN_ALL_CORE
GENERAL: NAD, well-groomed, well-developed  HEAD:  Atraumatic, Normocephalic  EYES: EOMI, PERRLA, conjunctiva and sclera clear  ENMT: No tonsillar erythema, exudates, or enlargement; Moist mucous membranes, Good dentition, No lesions  NECK: Supple, No JVD, Normal thyroid  NERVOUS SYSTEM:  Alert & Oriented X3, Good concentration; Motor Strength 5/5 B/L upper and lower extremities; DTRs 2+ intact and symmetric  CHEST/LUNG: Clear to auscultation bilaterally; No rales, rhonchi, wheezing, or rubs  HEART: Regular rate and rhythm; No murmurs, rubs, or gallops  ABDOMEN: Soft, Nontender, Nondistended; Bowel sounds present  EXTREMITIES:  2+ Peripheral Pulses, No clubbing, cyanosis, or edema  LYMPH: No lymphadenopathy noted  SKIN: No rashes or lesions Vital Signs : /82      HR  64     RR  17      O2sat: 98%         Constitutional: well developed, well nourished, no deformities and no acute distress    Neurological: Alert & Oriented x 3, BAEZ, no focal deficits    HEENT: NC/AT, PERRLA, EOMI,  Neck supple.    Respiratory: CTA B/L, No wheezing/crackles/rhonchi    Cardiovascular: (+) S1 & S2, RRR, No m/r/g    Gastrointestinal: soft, NT, nondistended, (+) BS    Genitourinary: non distended bladder, voiding freely    Extremities: +1 pedal edema B/L, No clubbing, No cyanosis    Skin:  normal skin color and pigmentation, no skin lesions

## 2019-05-03 NOTE — H&P ADULT - HISTORY OF PRESENT ILLNESS
63yr old male PMH HTN, HLD, T2DM, CAD, stent, 63yr old male PMH HTN, HLD, T2DM, CAD, s/p multiple stents (last stent in 4/2019) who c/o continued chest pain/ angina and sob with exertion although starting Ranexa and increased BP dose presented for cardiac cath with possible PCI. (Bleeding risk: 0.8%)

## 2019-05-03 NOTE — H&P ADULT - NSHPREVIEWOFSYSTEMS_GEN_ALL_CORE
CONSTITUTIONAL: No fever, weight loss, or fatigue  EYES: No eye pain, visual disturbances, or discharge  ENMT:  No difficulty hearing, tinnitus, vertigo; No sinus or throat pain  NECK: No pain or stiffness  BREASTS: No pain, masses, or nipple discharge  RESPIRATORY: No cough, wheezing, chills or hemoptysis; No shortness of breath  CARDIOVASCULAR: No chest pain, palpitations, dizziness, or leg swelling  GASTROINTESTINAL: No abdominal or epigastric pain. No nausea, vomiting, or hematemesis; No diarrhea or constipation. No melena or hematochezia.  GENITOURINARY: No dysuria, frequency, hematuria, or incontinence  NEUROLOGICAL: No headaches, memory loss, loss of strength, numbness, or tremors  SKIN: No itching, burning, rashes, or lesions   LYMPH NODES: No enlarged glands  ENDOCRINE: No heat or cold intolerance; No hair loss  MUSCULOSKELETAL: No joint pain or swelling; No muscle, back, or extremity pain  PSYCHIATRIC: No depression, anxiety, mood swings, or difficulty sleeping  HEME/LYMPH: No easy bruising, or bleeding gums  ALLERGY AND IMMUNOLOGIC: No hives or eczema General: Pt denies recent weight loss/fever/chills    Neurological: denies numbness or  sensation loss    Cardiovascular: + chest pain with exertion denies palpitations    Respiratory and Thorax: + CAGE, denies cough/wheezing    Gastrointestinal: denies abdominal pain/diarrhea/constipation/bloody stool    Genitourinary: denies urinary frequency/urgency/ dysuria    Musculoskeletal: denies joint pain or swelling, denies restricted motion    Hematologic: denies abnormal bleeding

## 2019-05-03 NOTE — H&P ADULT - NSICDXPASTSURGICALHX_GEN_ALL_CORE_FT
PAST SURGICAL HISTORY:  History of coronary artery stent placement Last stent was 2 weeks ago    History of lumbar discectomy     History of lumbar laminectomy     S/P coronary artery stent placement multiple stents,

## 2019-05-03 NOTE — H&P ADULT - NSHPLABSRESULTS_GEN_ALL_CORE
EKG: SB at 59 bpm, non-specific ST depression   Stress test (7/12/17): medium sized, mild to moderate defects in basal inferior, mid inferior walls, c/w infarct, ef: 52%  Echo 95/30/18): EF: 53%, moderately dilated LA, mild diastolic dysfunction (stage I).

## 2019-05-03 NOTE — H&P ADULT - ASSESSMENT
63yr old male PMH HTN, HLD, T2DM, CAD, s/p multiple stents (last stent in 4/2019) who c/o continued chest pain/ angina and sob with exertion although starting Ranexa and increased BP dose presented for cardiac cath with possible PCI. (Bleeding risk: 0.8%)   # CAD, s/p multiple stents   - plan for cardiac cath with possible PCI   - risks and benefits of procedure reviewed, all questions answered   - informed consent obtained, pt verbalized understanding.

## 2019-05-03 NOTE — H&P ADULT - NSICDXPASTMEDICALHX_GEN_ALL_CORE_FT
PAST MEDICAL HISTORY:  BPH (benign prostatic hyperplasia)     CAD (coronary artery disease)     Diabetes     Dyslipidemia     Gout     Hypertension     Morbid obesity

## 2019-05-03 NOTE — H&P ADULT - NSICDXFAMILYHX_GEN_ALL_CORE_FT
FAMILY HISTORY:  Father  Still living? Unknown  Family history of acute myocardial infarction, Age at diagnosis: Age Unknown

## 2019-05-03 NOTE — PHYSICAL EXAM
[General Appearance - Well Developed] : well developed [General Appearance - Well Nourished] : well nourished [Normal Conjunctiva] : the conjunctiva exhibited no abnormalities [Normal Oral Mucosa] : normal oral mucosa [Normal Oropharynx] : normal oropharynx [Normal Jugular Venous A Waves Present] : normal jugular venous A waves present [Normal Jugular Venous V Waves Present] : normal jugular venous V waves present [Auscultation Breath Sounds / Voice Sounds] : lungs were clear to auscultation bilaterally [Bowel Sounds] : normal bowel sounds [Abdomen Soft] : soft [Cyanosis, Localized] : no localized cyanosis [Abnormal Walk] : normal gait [Nail Clubbing] : no clubbing of the fingernails [] : no rash [No Anxiety] : not feeling anxious [Oriented To Time, Place, And Person] : oriented to person, place, and time [Normal] : normal [5th Left ICS - MCL] : palpated at the 5th LICS in the midclavicular line [Rhythm Regular] : regular [Normal S1] : normal S1 [I] : a grade 1 [Normal S2] : normal S2 [No Pitting Edema] : no pitting edema present [2+] : left 2+

## 2019-05-04 LAB
ALBUMIN SERPL ELPH-MCNC: 4.6 G/DL
ALP BLD-CCNC: 101 U/L
ALT SERPL-CCNC: 20 U/L
ANION GAP SERPL CALC-SCNC: 13 MMOL/L
AST SERPL-CCNC: 16 U/L
BASOPHILS # BLD AUTO: 0.07 K/UL
BASOPHILS NFR BLD AUTO: 0.9 %
BILIRUB SERPL-MCNC: 0.7 MG/DL
BUN SERPL-MCNC: 12 MG/DL
CALCIUM SERPL-MCNC: 9.9 MG/DL
CHLORIDE SERPL-SCNC: 98 MMOL/L
CO2 SERPL-SCNC: 26 MMOL/L
CREAT SERPL-MCNC: 0.75 MG/DL
EOSINOPHIL # BLD AUTO: 0.12 K/UL
EOSINOPHIL NFR BLD AUTO: 1.6 %
GLUCOSE SERPL-MCNC: 115 MG/DL
HCT VFR BLD CALC: 44.7 %
HGB BLD-MCNC: 14.5 G/DL
IMM GRANULOCYTES NFR BLD AUTO: 0.3 %
INR PPP: 0.96 RATIO
LYMPHOCYTES # BLD AUTO: 1.52 K/UL
LYMPHOCYTES NFR BLD AUTO: 19.8 %
MAN DIFF?: NORMAL
MCHC RBC-ENTMCNC: 27.9 PG
MCHC RBC-ENTMCNC: 32.4 GM/DL
MCV RBC AUTO: 86 FL
MONOCYTES # BLD AUTO: 0.6 K/UL
MONOCYTES NFR BLD AUTO: 7.8 %
NEUTROPHILS # BLD AUTO: 5.34 K/UL
NEUTROPHILS NFR BLD AUTO: 69.6 %
PLATELET # BLD AUTO: 294 K/UL
POTASSIUM SERPL-SCNC: 3.9 MMOL/L
PROT SERPL-MCNC: 7.6 G/DL
PT BLD: 11 SEC
RBC # BLD: 5.2 M/UL
RBC # FLD: 14.2 %
SODIUM SERPL-SCNC: 137 MMOL/L
WBC # FLD AUTO: 7.67 K/UL

## 2019-05-06 ENCOUNTER — OUTPATIENT (OUTPATIENT)
Dept: OUTPATIENT SERVICES | Facility: HOSPITAL | Age: 64
LOS: 1 days | Discharge: HOME | End: 2019-05-06
Payer: COMMERCIAL

## 2019-05-06 VITALS
TEMPERATURE: 97 F | OXYGEN SATURATION: 99 % | SYSTOLIC BLOOD PRESSURE: 170 MMHG | DIASTOLIC BLOOD PRESSURE: 74 MMHG | HEART RATE: 61 BPM | WEIGHT: 290.57 LBS | RESPIRATION RATE: 17 BRPM

## 2019-05-06 DIAGNOSIS — R06.02 SHORTNESS OF BREATH: ICD-10-CM

## 2019-05-06 DIAGNOSIS — Z98.890 OTHER SPECIFIED POSTPROCEDURAL STATES: Chronic | ICD-10-CM

## 2019-05-06 DIAGNOSIS — Z95.5 PRESENCE OF CORONARY ANGIOPLASTY IMPLANT AND GRAFT: Chronic | ICD-10-CM

## 2019-05-06 DIAGNOSIS — I20.9 ANGINA PECTORIS, UNSPECIFIED: ICD-10-CM

## 2019-05-06 DIAGNOSIS — I25.110 ATHEROSCLEROTIC HEART DISEASE OF NATIVE CORONARY ARTERY WITH UNSTABLE ANGINA PECTORIS: ICD-10-CM

## 2019-05-06 LAB
ANION GAP SERPL CALC-SCNC: 7 MMOL/L — SIGNIFICANT CHANGE UP (ref 5–17)
BLD GP AB SCN SERPL QL: SIGNIFICANT CHANGE UP
BUN SERPL-MCNC: 16 MG/DL — SIGNIFICANT CHANGE UP (ref 7–23)
CALCIUM SERPL-MCNC: 8.6 MG/DL — SIGNIFICANT CHANGE UP (ref 8.5–10.1)
CHLORIDE SERPL-SCNC: 100 MMOL/L — SIGNIFICANT CHANGE UP (ref 96–108)
CO2 SERPL-SCNC: 28 MMOL/L — SIGNIFICANT CHANGE UP (ref 22–31)
CREAT SERPL-MCNC: 0.87 MG/DL — SIGNIFICANT CHANGE UP (ref 0.5–1.3)
GLUCOSE SERPL-MCNC: 200 MG/DL — HIGH (ref 70–99)
HCV AB S/CO SERPL IA: 0.09 S/CO — SIGNIFICANT CHANGE UP (ref 0–0.99)
HCV AB SERPL-IMP: SIGNIFICANT CHANGE UP
POTASSIUM SERPL-MCNC: 3.3 MMOL/L — LOW (ref 3.5–5.3)
POTASSIUM SERPL-SCNC: 3.3 MMOL/L — LOW (ref 3.5–5.3)
SODIUM SERPL-SCNC: 135 MMOL/L — SIGNIFICANT CHANGE UP (ref 135–145)
TYPE + AB SCN PNL BLD: SIGNIFICANT CHANGE UP

## 2019-05-06 PROCEDURE — 92928 PRQ TCAT PLMT NTRAC ST 1 LES: CPT | Mod: RC

## 2019-05-06 PROCEDURE — 99152 MOD SED SAME PHYS/QHP 5/>YRS: CPT | Mod: 59

## 2019-05-06 PROCEDURE — 93010 ELECTROCARDIOGRAM REPORT: CPT

## 2019-05-06 PROCEDURE — 93458 L HRT ARTERY/VENTRICLE ANGIO: CPT | Mod: 26,59

## 2019-05-06 PROCEDURE — 93571 IV DOP VEL&/PRESS C FLO 1ST: CPT | Mod: 26,RC

## 2019-05-06 PROCEDURE — 92929: CPT | Mod: RC

## 2019-05-06 RX ORDER — ASPIRIN/CALCIUM CARB/MAGNESIUM 324 MG
81 TABLET ORAL DAILY
Qty: 0 | Refills: 0 | Status: DISCONTINUED | OUTPATIENT
Start: 2019-05-06 | End: 2019-05-06

## 2019-05-06 RX ORDER — CLOPIDOGREL BISULFATE 75 MG/1
75 TABLET, FILM COATED ORAL DAILY
Qty: 0 | Refills: 0 | Status: DISCONTINUED | OUTPATIENT
Start: 2019-05-07 | End: 2019-05-07

## 2019-05-06 RX ORDER — LOSARTAN POTASSIUM 100 MG/1
100 TABLET, FILM COATED ORAL DAILY
Qty: 0 | Refills: 0 | Status: DISCONTINUED | OUTPATIENT
Start: 2019-05-07 | End: 2019-05-07

## 2019-05-06 RX ORDER — ACETAMINOPHEN 500 MG
650 TABLET ORAL EVERY 6 HOURS
Qty: 0 | Refills: 0 | Status: DISCONTINUED | OUTPATIENT
Start: 2019-05-06 | End: 2019-05-07

## 2019-05-06 RX ORDER — SODIUM CHLORIDE 9 MG/ML
1000 INJECTION INTRAMUSCULAR; INTRAVENOUS; SUBCUTANEOUS
Qty: 0 | Refills: 0 | Status: DISCONTINUED | OUTPATIENT
Start: 2019-05-06 | End: 2019-05-07

## 2019-05-06 RX ORDER — HYDROCHLOROTHIAZIDE 25 MG
12.5 TABLET ORAL DAILY
Qty: 0 | Refills: 0 | Status: DISCONTINUED | OUTPATIENT
Start: 2019-05-07 | End: 2019-05-07

## 2019-05-06 RX ORDER — ATORVASTATIN CALCIUM 80 MG/1
80 TABLET, FILM COATED ORAL AT BEDTIME
Qty: 0 | Refills: 0 | Status: DISCONTINUED | OUTPATIENT
Start: 2019-05-06 | End: 2019-05-07

## 2019-05-06 RX ORDER — AMLODIPINE BESYLATE 2.5 MG/1
2.5 TABLET ORAL DAILY
Qty: 0 | Refills: 0 | Status: DISCONTINUED | OUTPATIENT
Start: 2019-05-07 | End: 2019-05-07

## 2019-05-06 RX ORDER — METOPROLOL TARTRATE 50 MG
25 TABLET ORAL
Qty: 0 | Refills: 0 | Status: DISCONTINUED | OUTPATIENT
Start: 2019-05-06 | End: 2019-05-07

## 2019-05-06 RX ORDER — ASPIRIN/CALCIUM CARB/MAGNESIUM 324 MG
81 TABLET ORAL DAILY
Qty: 0 | Refills: 0 | Status: DISCONTINUED | OUTPATIENT
Start: 2019-05-07 | End: 2019-05-07

## 2019-05-06 RX ADMIN — Medication 25 MILLIGRAM(S): at 19:55

## 2019-05-06 RX ADMIN — ATORVASTATIN CALCIUM 80 MILLIGRAM(S): 80 TABLET, FILM COATED ORAL at 22:27

## 2019-05-06 NOTE — ASU PATIENT PROFILE, ADULT - PSH
History of coronary artery stent placement  Last stent was 2 weeks ago  History of lumbar discectomy    History of lumbar laminectomy    S/P coronary artery stent placement  x 1 to Prox circ & IVUS

## 2019-05-06 NOTE — PROGRESS NOTE ADULT - SUBJECTIVE AND OBJECTIVE BOX
HPI:  63yr old male PMH HTN, HLD, T2DM, CAD, s/p multiple stents (last stent in 4/2019) who c/o continued chest pain/ angina and sob with exertion although starting Ranexa and increased BP dose presented for cardiac cath with possible PCI. (Bleeding risk: 0.8%)   Now, pt is s/p PCI with BULL x2 to distal RCA and RPL.     ROS: denies chest pain/ pressure, SOB or palpitation     Physical exam:   General: awake, no acute distress   HEENT: NCAT, neck supple   CV: RRR, normal S1S2, no murmur/ rub   Pulmonary: clear, no wheezing or rales   GI: +BS, soft, non-tender, non-distended   : voiding freely   Extremities: no edema, + pedal pulses   Skin: no rashes or lesion. Rt. radial access site with radial band (to be removed at 5 pm): no hematoma or bleeding     # s/p PCI with BULL x2 to distal RCA and RPL.   - CICU monitor   - IV hydration  - Labs and EKG in am   - post procedure, outcome and follow up care reviewed with patient by Dr. Ferreira   - continue ASA and Plavix   - continue Losartan  - continue metoprolol  - continue atorvastatin   - hold Metformin for 48 hour post procedure, resume on 5/9.   - possible discharge home in am if medically stable  - follow up with Cardiologist in 4-7 days

## 2019-05-06 NOTE — ASU PATIENT PROFILE, ADULT - PMH
Delivery Summary    Patient: Drew Mendoza MRN: [id number]  SSN: [id number]    YOB: 1994  Age: 30 y.o. Sex: female       Information for the patient's :  Nayeli White [[id number]]       Labor Events:    Labor: No   Rupture Date: 2019   Rupture Time: 7:40 AM   Rupture Type AROM   Amniotic Fluid Volume: Moderate    Amniotic Fluid Description: Clear None   Induction: AROM; Oxytocin       Augmentation: None   Labor Events: None     Cervical Ripening:     None     Delivery Events:  Episiotomy:     Laceration(s):       Repaired:      Number of Repair Packets:     Suture Type and Size:       Estimated Blood Loss (ml):  ml       Delivery Date: 2019    Delivery Time: 5:16 PM  Delivery Type: Vaginal, Spontaneous  Sex:  Male     Gestational Age: 43w3d   Delivery Clinician:  Marisel Castañeda  Living Status: Living   Delivery Location: [address]          APGARS  One minute Five minutes Ten minutes   Skin color: 0   1        Heart rate: 2   2        Grimace: 2   2        Muscle tone: 2   2        Breathin   2        Totals: 8   9            Presentation: Vertex    Position: Left Occiput Anterior  Resuscitation Method:  Tactile Stimulation;Suctioning-bulb     Meconium Stained:        Cord Information: 3 Vessels  Complications: None  Cord around:    Delayed cord clamping? Yes  Cord clamped date/time:   Disposition of Cord Blood: Lab    Blood Gases Sent?: Yes    Placenta:  Date/Time: 2019  5:23 PM  Removal: Spontaneous      Appearance: Normal;Intact      Measurements:  Birth Weight: 3.86 kg      Birth Length: 53.5 cm      Head Circumference: 35 cm      Chest Circumference: 36.5 cm     Abdominal Girth:       Other Providers:   Wendy Monday LAWANDA BURDICK;MING EVANS;CAROLYN TENA;DARIEN STRANGE, Obstetrician;Primary Nurse;Scrub Tech;Charge Nurse;Staff Nurse           Group B Strep:   Lab Results   Component Value Date/Time Calderon, External negative 2019     Information for the patient's :  Aayush Gore [[id number]]   No results found for: ABORH, PCTABR, PCTDIG, BILI, ABORHEXT, ABORH    No results for input(s): PCO2CB, PO2CB, HCO3I, SO2I, IBD, PTEMPI, SPECTI, PHICB, ISITE, IDEV, IALLEN in the last 72 hours. Head of the infant delivered over an intact perineum, oropharynx and nares were bulb suctioned. The remainder of the infant delivered without difficulty. The cord was cross clamped and divided and the infant handed to awaiting personnel. Cord blood was then obtained for pH and  studies. The placenta then delivered spontaneously, intact with firm fundus and minimal lochia appreciated.     Left labial lac hemostatic posterior superficial vaginal lac repaired with 3-0 vicryl with excellent cosmesis and hemostasis BPH (benign prostatic hyperplasia)    CAD (coronary artery disease)    Diabetes    Dyslipidemia    Gout    Hypertension    Morbid obesity

## 2019-05-07 ENCOUNTER — TRANSCRIPTION ENCOUNTER (OUTPATIENT)
Age: 64
End: 2019-05-07

## 2019-05-07 VITALS — HEART RATE: 74 BPM | WEIGHT: 302.69 LBS | SYSTOLIC BLOOD PRESSURE: 97 MMHG | DIASTOLIC BLOOD PRESSURE: 77 MMHG

## 2019-05-07 DIAGNOSIS — I25.10 ATHEROSCLEROTIC HEART DISEASE OF NATIVE CORONARY ARTERY WITHOUT ANGINA PECTORIS: ICD-10-CM

## 2019-05-07 LAB
BASOPHILS # BLD AUTO: 0.03 K/UL — SIGNIFICANT CHANGE UP (ref 0–0.2)
BASOPHILS NFR BLD AUTO: 0.3 % — SIGNIFICANT CHANGE UP (ref 0–2)
EOSINOPHIL # BLD AUTO: 0.12 K/UL — SIGNIFICANT CHANGE UP (ref 0–0.5)
EOSINOPHIL NFR BLD AUTO: 1.2 % — SIGNIFICANT CHANGE UP (ref 0–6)
HCT VFR BLD CALC: 39.7 % — SIGNIFICANT CHANGE UP (ref 39–50)
HGB BLD-MCNC: 13.4 G/DL — SIGNIFICANT CHANGE UP (ref 13–17)
IMM GRANULOCYTES NFR BLD AUTO: 0.3 % — SIGNIFICANT CHANGE UP (ref 0–1.5)
LYMPHOCYTES # BLD AUTO: 1.27 K/UL — SIGNIFICANT CHANGE UP (ref 1–3.3)
LYMPHOCYTES # BLD AUTO: 12.4 % — LOW (ref 13–44)
MCHC RBC-ENTMCNC: 28.3 PG — SIGNIFICANT CHANGE UP (ref 27–34)
MCHC RBC-ENTMCNC: 33.8 GM/DL — SIGNIFICANT CHANGE UP (ref 32–36)
MCV RBC AUTO: 83.9 FL — SIGNIFICANT CHANGE UP (ref 80–100)
MONOCYTES # BLD AUTO: 0.85 K/UL — SIGNIFICANT CHANGE UP (ref 0–0.9)
MONOCYTES NFR BLD AUTO: 8.3 % — SIGNIFICANT CHANGE UP (ref 2–14)
NEUTROPHILS # BLD AUTO: 7.91 K/UL — HIGH (ref 1.8–7.4)
NEUTROPHILS NFR BLD AUTO: 77.5 % — HIGH (ref 43–77)
NRBC # BLD: 0 /100 WBCS — SIGNIFICANT CHANGE UP (ref 0–0)
PLATELET # BLD AUTO: 231 K/UL — SIGNIFICANT CHANGE UP (ref 150–400)
RBC # BLD: 4.73 M/UL — SIGNIFICANT CHANGE UP (ref 4.2–5.8)
RBC # FLD: 14.2 % — SIGNIFICANT CHANGE UP (ref 10.3–14.5)
WBC # BLD: 10.21 K/UL — SIGNIFICANT CHANGE UP (ref 3.8–10.5)
WBC # FLD AUTO: 10.21 K/UL — SIGNIFICANT CHANGE UP (ref 3.8–10.5)

## 2019-05-07 PROCEDURE — 93010 ELECTROCARDIOGRAM REPORT: CPT

## 2019-05-07 PROCEDURE — 99232 SBSQ HOSP IP/OBS MODERATE 35: CPT

## 2019-05-07 RX ORDER — METFORMIN HYDROCHLORIDE 850 MG/1
1 TABLET ORAL
Qty: 0 | Refills: 0 | COMMUNITY

## 2019-05-07 RX ORDER — POTASSIUM CHLORIDE 20 MEQ
40 PACKET (EA) ORAL ONCE
Qty: 0 | Refills: 0 | Status: COMPLETED | OUTPATIENT
Start: 2019-05-07 | End: 2019-05-07

## 2019-05-07 RX ORDER — CLOPIDOGREL BISULFATE 75 MG/1
1 TABLET, FILM COATED ORAL
Qty: 30 | Refills: 11
Start: 2019-05-07 | End: 2020-04-30

## 2019-05-07 RX ADMIN — AMLODIPINE BESYLATE 2.5 MILLIGRAM(S): 2.5 TABLET ORAL at 05:33

## 2019-05-07 RX ADMIN — Medication 25 MILLIGRAM(S): at 05:33

## 2019-05-07 RX ADMIN — LOSARTAN POTASSIUM 100 MILLIGRAM(S): 100 TABLET, FILM COATED ORAL at 05:33

## 2019-05-07 RX ADMIN — Medication 12.5 MILLIGRAM(S): at 05:33

## 2019-05-07 RX ADMIN — CLOPIDOGREL BISULFATE 75 MILLIGRAM(S): 75 TABLET, FILM COATED ORAL at 09:09

## 2019-05-07 RX ADMIN — Medication 81 MILLIGRAM(S): at 09:09

## 2019-05-07 RX ADMIN — Medication 40 MILLIEQUIVALENT(S): at 09:37

## 2019-05-07 NOTE — DISCHARGE NOTE PROVIDER - CARE PROVIDER_API CALL
Jay Guerra (MD)  Cardiovascular Disease  [address] [place] [address]  Phone: [phone]  Fax: [phone]  Follow Up Time: 1 week

## 2019-05-07 NOTE — DISCHARGE NOTE NURSING/CASE MANAGEMENT/SOCIAL WORK - NSDCPEWEB_GEN_ALL_CORE
[facility] for Tobacco Control website --- [email or web address] website --- [email or web address]

## 2019-05-07 NOTE — DISCHARGE NOTE NURSING/CASE MANAGEMENT/SOCIAL WORK - NSDCDPATPORTLINK_GEN_ALL_CORE
You can access the [facility] Portal, offered by [facility], by registering with the following website: [email or web address]

## 2019-05-07 NOTE — PROGRESS NOTE ADULT - SUBJECTIVE AND OBJECTIVE BOX
Nurse Practitioner Progress note:   s/p  PCI on 5/6/19.  Denies chest pain, shortness of breath, dizziness or palpitations at this time    PHYSICAL EXAM:    Constitutional: NAD  Neuro: Alert and oriented x 3 Gait steady Speech clear No focal deficits  Respiratory: CTAB  Cardiovascular: S1 and S2, RRR,   Gastrointestinal: BS+, soft, NT/ND  Extremities: No clubbing cyanosis or edema No varicosities  Vascular: 2+ peripheral pulses  Psychiatric: Normal mood, normal affect  Musculoskeletal: 5/5 strength b/l upper and lower extremities  Right radial dsg removed. Procedure site CDI, no bleeding, no hematoma, able to move all digits with capillary refill < 3 seconds, fingers warm      T(C): 36.1 (05-07-19 @ 05:27), Max: 36.1 (05-06-19 @ 21:26)  HR: 69 (05-07-19 @ 05:00) (60 - 75)  BP: 135/61 (05-07-19 @ 05:00) (131/61 - 164/71)  RR: 10 (05-07-19 @ 05:00) (9 - 21)  SpO2: 94% (05-06-19 @ 19:00) (94% - 99%)    EKG: NSR  Tele: no arrhythmias     CBC Full  -  ( 07 May 2019 05:32 )  WBC Count : 10.21 K/uL  RBC Count : 4.73 M/uL  Hemoglobin : 13.4 g/dL  Hematocrit : 39.7 %  Platelet Count - Automated : 231 K/uL  Mean Cell Volume : 83.9 fl  Mean Cell Hemoglobin : 28.3 pg  Mean Cell Hemoglobin Concentration : 33.8 gm/dL  Auto Neutrophil # : 7.91 K/uL  Auto Lymphocyte # : 1.27 K/uL  Auto Monocyte # : 0.85 K/uL  Auto Eosinophil # : 0.12 K/uL  Auto Basophil # : 0.03 K/uL  Auto Neutrophil % : 77.5 %  Auto Lymphocyte % : 12.4 %  Auto Monocyte % : 8.3 %  Auto Eosinophil % : 1.2 %  Auto Basophil % : 0.3 %    05-06135  |  100  |  16  ----------------------------<  200<H>  3.3<L>   |  28  |  0.87    Ca    8.6      06 May 2019 19:13    MEDICATIONS  (STANDING):  amLODIPine   Tablet 2.5 milliGRAM(s) Oral daily  aspirin  chewable 81 milliGRAM(s) Oral daily  atorvastatin 80 milliGRAM(s) Oral at bedtime  clopidogrel Tablet 75 milliGRAM(s) Oral daily  hydrochlorothiazide 12.5 milliGRAM(s) Oral daily  losartan 100 milliGRAM(s) Oral daily  metoprolol tartrate 25 milliGRAM(s) Oral two times a day  sodium chloride 0.9%. 1000 milliLiter(s) (100 mL/Hr) IV Continuous <Continuous>    MEDICATIONS  (PRN):  acetaminophen   Tablet .. 650 milliGRAM(s) Oral every 6 hours PRN Temp greater or equal to 38C (100.4F), Moderate Pain (4 - 6)  aluminum hydroxide/magnesium hydroxide/simethicone Suspension 30 milliLiter(s) Oral every 6 hours PRN Dyspepsia        HPI:  63yr old male PMH HTN, HLD, T2DM, CAD, s/p multiple stents (last stent in 4/2019) who c/o continued chest pain/ angina and sob with exertion although starting Ranexa and increased BP dose presented for cardiac cath with possible PCI.  s/p LHC < from: Cardiac Cath Lab - Adult (05.06.19 @ 13:47) >  3 vessel CAD with new lession (LAD) and instent restenosis in RCA with   Borderline Low NL LV FX.     Interventional Conclusions     Successful Coronary Intervention BULL of RCA.    < end of copied text >      ASSESSMENT/PLAN:    Coronary artery disease  -Discharge home today  -VS, labs, diet, activity as per PCI orders  -Encourage PO fluids  -Aspirin 81mg PO daily  -Plavix 75mg PO daily-Rx sent  -Continue statin, ARB and Toprol  -Plan of care discussed with patient  -Discussed therapeutic lifestyle changes to reduce risk factors such as following a cardiac diet, weight loss, maintaining a healthy weight, exercise, smoking cessation, medication compliance, and regular follow-up  with MD

## 2019-05-07 NOTE — DISCHARGE NOTE PROVIDER - NSDCCPCAREPLAN_GEN_ALL_CORE_FT
PRINCIPAL DISCHARGE DIAGNOSIS  Diagnosis: CAD (coronary artery disease)  Assessment and Plan of Treatment: Aspirin, Plavix

## 2019-05-07 NOTE — PROGRESS NOTE ADULT - PROBLEM SELECTOR PLAN 1
s/p PCI of RCA.  Meds adjusted yesterday and will continue on this regime for CAD and better control of BP/HTN.  BS control has been slightly off and he follow up with blood work at f/u visit in 2 weeks and then if further adjustments are needed this will be addressed.

## 2019-05-07 NOTE — DISCHARGE NOTE NURSING/CASE MANAGEMENT/SOCIAL WORK - NSDCPEEMAIL_GEN_ALL_CORE
[facility] email [email or web address]

## 2019-05-07 NOTE — PROGRESS NOTE ADULT - SUBJECTIVE AND OBJECTIVE BOX
63yr old male PMH HTN, HLD, T2DM, CAD, s/p multiple stents (last stent in 2019) who c/o continued chest pain/ angina and sob with exertion although starting Ranexa and increased BP dose presented for cardiac cath with possible PCI. Unstable angina with CAD hx and multivessel PCI in past with PCI of RCA yesterday. s/p LHC with instent restenosis in RCA with Borderline Low NL LV FX. Successful Coronary Intervention BULL of RCA.    19: Doing well today and feels better without chest pain or SOB.    PHYSICAL EXAM:    Constitutional: NAD  Neuro: Alert and oriented x 3 Gait steady Speech clear No focal deficits  Respiratory: CTAB  Cardiovascular: S1 and S2, RRR,   Gastrointestinal: BS+, soft, NT/ND  Extremities: No clubbing cyanosis or edema No varicosities  Vascular: 2+ peripheral pulses  Psychiatric: Normal mood, normal affect  Musculoskeletal: 5/5 strength b/l upper and lower extremities  Right radial procedure site no bleeding, no hematoma, able to move all digits with capillary refill < 3 seconds, fingers warm    EKG: NSR    MEDICATIONS  (STANDING):  amLODIPine   Tablet 2.5 milliGRAM(s) Oral daily  aspirin  chewable 81 milliGRAM(s) Oral daily  atorvastatin 80 milliGRAM(s) Oral at bedtime  clopidogrel Tablet 75 milliGRAM(s) Oral daily  hydrochlorothiazide 12.5 milliGRAM(s) Oral daily  losartan 100 milliGRAM(s) Oral daily  metoprolol tartrate 25 milliGRAM(s) Oral two times a day  sodium chloride 0.9%. 1000 milliLiter(s) (100 mL/Hr) IV Continuous <Continuous>    MEDICATIONS  (PRN):  acetaminophen   Tablet .. 650 milliGRAM(s) Oral every 6 hours PRN Temp greater or equal to 38C (100.4F), Moderate Pain (4 - 6)  aluminum hydroxide/magnesium hydroxide/simethicone Suspension 30 milliLiter(s) Oral every 6 hours PRN Dyspepsia      Vital Signs Last 24 Hrs  T(C): 36.1 (07 May 2019 05:27), Max: 36.1 (06 May 2019 21:26)  T(F): 96.9 (07 May 2019 05:27), Max: 96.9 (06 May 2019 21:26)  HR: 74 (07 May 2019 08:00) (60 - 75)  BP: 97/77 (07 May 2019 08:00) (97/77 - 164/71)  BP(mean): 82 (07 May 2019 08:00) (76 - 109)  RR: 10 (07 May 2019 05:00) (9 - 21)  SpO2: 94% (06 May 2019 19:00) (94% - 99%)    I&O's Detail    06 May 2019 07:01  -  07 May 2019 07:00  --------------------------------------------------------  IN:  Total IN: 0 mL    OUT:    Voided: 1000 mL  Total OUT: 1000 mL    Total NET: -1000 mL          Daily     Daily Weight in k.3 (07 May 2019 08:00)                          13.4   10.21 )-----------( 231      ( 07 May 2019 05:32 )             39.7     05-06    135  |  100  |  16  ----------------------------<  200<H>  3.3<L>   |  28  |  0.87    Ca    8.6      06 May 2019 19:13

## 2019-05-07 NOTE — DISCHARGE NOTE PROVIDER - HOSPITAL COURSE
63yr old male PMH HTN, HLD, T2DM, CAD, s/p multiple stents (last stent in 4/2019) who c/o continued chest pain/ angina and sob with exertion although starting Ranexa and increased BP dose presented for cardiac cath with possible PCI. (Bleeding risk: 0.8%)      s/p PCI with BULL x2 to distal RCA and RPL on 5/6/19

## 2019-05-09 DIAGNOSIS — N40.0 BENIGN PROSTATIC HYPERPLASIA WITHOUT LOWER URINARY TRACT SYMPTOMS: ICD-10-CM

## 2019-05-09 DIAGNOSIS — Z82.49 FAMILY HISTORY OF ISCHEMIC HEART DISEASE AND OTHER DISEASES OF THE CIRCULATORY SYSTEM: ICD-10-CM

## 2019-05-09 DIAGNOSIS — R07.89 OTHER CHEST PAIN: ICD-10-CM

## 2019-05-09 DIAGNOSIS — Z88.5 ALLERGY STATUS TO NARCOTIC AGENT: ICD-10-CM

## 2019-05-09 DIAGNOSIS — Z95.5 PRESENCE OF CORONARY ANGIOPLASTY IMPLANT AND GRAFT: ICD-10-CM
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
## 2019-05-09 DIAGNOSIS — Z79.02 LONG TERM (CURRENT) USE OF ANTITHROMBOTICS/ANTIPLATELETS: ICD-10-CM

## 2019-05-09 DIAGNOSIS — E78.00 PURE HYPERCHOLESTEROLEMIA, UNSPECIFIED: ICD-10-CM

## 2019-05-09 DIAGNOSIS — Z87.891 PERSONAL HISTORY OF NICOTINE DEPENDENCE: ICD-10-CM

## 2019-05-09 DIAGNOSIS — M10.9 GOUT, UNSPECIFIED: ICD-10-CM

## 2019-05-09 DIAGNOSIS — I20.0 UNSTABLE ANGINA: ICD-10-CM

## 2019-05-09 DIAGNOSIS — T82.855A STENOSIS OF CORONARY ARTERY STENT, INITIAL ENCOUNTER: ICD-10-CM

## 2019-05-09 DIAGNOSIS — I10 ESSENTIAL (PRIMARY) HYPERTENSION: ICD-10-CM

## 2019-05-09 DIAGNOSIS — I25.110 ATHEROSCLEROTIC HEART DISEASE OF NATIVE CORONARY ARTERY WITH UNSTABLE ANGINA PECTORIS: ICD-10-CM

## 2019-05-09 DIAGNOSIS — Z79.84 LONG TERM (CURRENT) USE OF ORAL HYPOGLYCEMIC DRUGS: ICD-10-CM

## 2019-05-09 DIAGNOSIS — Z79.82 LONG TERM (CURRENT) USE OF ASPIRIN: ICD-10-CM

## 2019-05-09 DIAGNOSIS — E11.9 TYPE 2 DIABETES MELLITUS WITHOUT COMPLICATIONS: ICD-10-CM

## 2019-05-09 DIAGNOSIS — I25.2 OLD MYOCARDIAL INFARCTION: ICD-10-CM
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
## 2019-05-09 DIAGNOSIS — E66.01 MORBID (SEVERE) OBESITY DUE TO EXCESS CALORIES: ICD-10-CM

## 2019-05-22 ENCOUNTER — APPOINTMENT (OUTPATIENT)
Dept: CARDIOLOGY | Facility: CLINIC | Age: 64
End: 2019-05-22
Payer: COMMERCIAL

## 2019-05-22 ENCOUNTER — NON-APPOINTMENT (OUTPATIENT)
Age: 64
End: 2019-05-22

## 2019-05-22 VITALS
DIASTOLIC BLOOD PRESSURE: 80 MMHG | SYSTOLIC BLOOD PRESSURE: 162 MMHG | HEART RATE: 61 BPM | BODY MASS INDEX: 40.56 KG/M2 | HEIGHT: 73 IN | WEIGHT: 306 LBS | OXYGEN SATURATION: 99 %

## 2019-05-22 PROCEDURE — 99214 OFFICE O/P EST MOD 30 MIN: CPT | Mod: 25

## 2019-05-22 PROCEDURE — 93000 ELECTROCARDIOGRAM COMPLETE: CPT

## 2019-05-22 RX ORDER — TAMSULOSIN HYDROCHLORIDE 0.4 MG/1
0.4 CAPSULE ORAL
Qty: 90 | Refills: 3 | Status: DISCONTINUED | COMMUNITY
Start: 2018-09-06

## 2019-05-22 NOTE — PHYSICAL EXAM
[General Appearance - Well Developed] : well developed [General Appearance - Well Nourished] : well nourished [Normal Conjunctiva] : the conjunctiva exhibited no abnormalities [Normal Oral Mucosa] : normal oral mucosa [Normal Oropharynx] : normal oropharynx [Normal Jugular Venous A Waves Present] : normal jugular venous A waves present [Normal Jugular Venous V Waves Present] : normal jugular venous V waves present [Auscultation Breath Sounds / Voice Sounds] : lungs were clear to auscultation bilaterally [Bowel Sounds] : normal bowel sounds [Abdomen Soft] : soft [Abnormal Walk] : normal gait [Nail Clubbing] : no clubbing of the fingernails [Cyanosis, Localized] : no localized cyanosis [] : no rash [Oriented To Time, Place, And Person] : oriented to person, place, and time [No Anxiety] : not feeling anxious [5th Left ICS - MCL] : palpated at the 5th LICS in the midclavicular line [Normal] : normal [Rhythm Regular] : regular [Normal S1] : normal S1 [Normal S2] : normal S2 [I] : a grade 1 [2+] : left 2+ [No Pitting Edema] : no pitting edema present

## 2019-05-24 NOTE — DISCUSSION/SUMMARY
[FreeTextEntry1] : Crescendo angina/Chest pain/CAD/Prior PCI: Continue current medication regimen. currently no symptoms.  \par HTN: Suboptimal control. Will increase amlodipine to 5mg twice daily, will continue other meds.  Low Sodium diet discussed. \par \par OV in 6 weeks .\par \par \par

## 2019-05-24 NOTE — HISTORY OF PRESENT ILLNESS
[FreeTextEntry1] : S/pP cath on 5/6/19. PCI to RCA,and LAD. Right radial cath site clean and intact. pluses present. Denies chest pain, shortness of breath, dyspnea on exertion, palpitations, orthopnea, paroxysmal nocturnal dyspnea, claudication, dizziness, lightheadedness, presyncopal or syncopal symptoms. \par BP still running high. .

## 2019-06-04 RX ORDER — LOSARTAN POTASSIUM AND HYDROCHLOROTHIAZIDE 12.5; 5 MG/1; MG/1
50-12.5 TABLET ORAL
Qty: 90 | Refills: 3 | Status: DISCONTINUED | COMMUNITY
Start: 2018-06-14 | End: 2019-06-04

## 2019-06-05 ENCOUNTER — MEDICATION RENEWAL (OUTPATIENT)
Age: 64
End: 2019-06-05

## 2019-07-24 ENCOUNTER — NON-APPOINTMENT (OUTPATIENT)
Age: 64
End: 2019-07-24

## 2019-07-24 ENCOUNTER — APPOINTMENT (OUTPATIENT)
Dept: CARDIOLOGY | Facility: CLINIC | Age: 64
End: 2019-07-24
Payer: COMMERCIAL

## 2019-07-24 VITALS
HEART RATE: 69 BPM | TEMPERATURE: 98.5 F | SYSTOLIC BLOOD PRESSURE: 143 MMHG | HEIGHT: 73 IN | BODY MASS INDEX: 40.69 KG/M2 | OXYGEN SATURATION: 98 % | RESPIRATION RATE: 18 BRPM | DIASTOLIC BLOOD PRESSURE: 72 MMHG | WEIGHT: 307 LBS

## 2019-07-24 PROCEDURE — 93000 ELECTROCARDIOGRAM COMPLETE: CPT

## 2019-07-24 PROCEDURE — 99214 OFFICE O/P EST MOD 30 MIN: CPT | Mod: 25

## 2019-08-06 ENCOUNTER — MEDICATION RENEWAL (OUTPATIENT)
Age: 64
End: 2019-08-06

## 2019-08-17 NOTE — PHYSICAL EXAM
[General Appearance - Well Developed] : well developed [General Appearance - Well Nourished] : well nourished [Normal Conjunctiva] : the conjunctiva exhibited no abnormalities [Normal Oral Mucosa] : normal oral mucosa [Normal Oropharynx] : normal oropharynx [Normal Jugular Venous A Waves Present] : normal jugular venous A waves present [Normal Jugular Venous V Waves Present] : normal jugular venous V waves present [Auscultation Breath Sounds / Voice Sounds] : lungs were clear to auscultation bilaterally [Bowel Sounds] : normal bowel sounds [Abdomen Soft] : soft [Abnormal Walk] : normal gait [Nail Clubbing] : no clubbing of the fingernails [Cyanosis, Localized] : no localized cyanosis [] : no rash [Oriented To Time, Place, And Person] : oriented to person, place, and time [No Anxiety] : not feeling anxious [5th Left ICS - MCL] : palpated at the 5th LICS in the midclavicular line [Normal] : normal [Rhythm Regular] : regular [Normal S1] : normal S1 [Normal S2] : normal S2 [I] : a grade 1 [2+] : left 2+ [___ +] : bilateral [unfilled]U+ pretibial pitting edema

## 2019-08-17 NOTE — HISTORY OF PRESENT ILLNESS
[FreeTextEntry1] : BP better after adjustment in meds. Denies chest pain, shortness of breath, dyspnea on exertion, palpitations, orthopnea, paroxysmal nocturnal dyspnea, claudication, dizziness, lightheadedness, presyncopal or syncopal symptoms. \par

## 2019-08-17 NOTE — DISCUSSION/SUMMARY
[FreeTextEntry1] : Crescendo angina/Chest pain/CAD/Prior PCI: Stable and doing well, s/p PCI. Continue current medical regime.   \par HTN: Improved but still suboptimal control. Will add Lasix to address this and lower extremity edema.  Low Sodium diet discussed. \par Hyperlipidemia: Labs when he returns. Low fat diet discussed.\par F/u in 2 months.\par \par \par

## 2019-09-25 ENCOUNTER — APPOINTMENT (OUTPATIENT)
Dept: CARDIOLOGY | Facility: CLINIC | Age: 64
End: 2019-09-25
Payer: COMMERCIAL

## 2019-09-25 ENCOUNTER — NON-APPOINTMENT (OUTPATIENT)
Age: 64
End: 2019-09-25

## 2019-09-25 VITALS — BODY MASS INDEX: 40.42 KG/M2 | HEIGHT: 73 IN | WEIGHT: 305 LBS

## 2019-09-25 VITALS — DIASTOLIC BLOOD PRESSURE: 73 MMHG | HEART RATE: 68 BPM | SYSTOLIC BLOOD PRESSURE: 138 MMHG | OXYGEN SATURATION: 95 %

## 2019-09-25 PROCEDURE — 93000 ELECTROCARDIOGRAM COMPLETE: CPT

## 2019-09-25 PROCEDURE — 99215 OFFICE O/P EST HI 40 MIN: CPT | Mod: 25

## 2019-09-26 NOTE — DISCUSSION/SUMMARY
[FreeTextEntry1] : Cough: c/w Bronchitis: RX for Zpack given.  If not improving he will either see pulmonary sooner or contact me.\par Crescendo angina/Chest pain/CAD/Prior PCI: Stable and doing well, s/p PCI. Continue current medical regime.   \par HTN: Much improved. Low Sodium diet discussed. \par Hyperlipidemia: Labs when he returns. Low fat diet discussed.\par F/u in 3 weeks.\par \par \par

## 2019-09-26 NOTE — PHYSICAL EXAM
[General Appearance - Well Developed] : well developed [General Appearance - Well Nourished] : well nourished [Normal Conjunctiva] : the conjunctiva exhibited no abnormalities [Normal Oral Mucosa] : normal oral mucosa [Normal Oropharynx] : normal oropharynx [Normal Jugular Venous A Waves Present] : normal jugular venous A waves present [Normal Jugular Venous V Waves Present] : normal jugular venous V waves present [Bowel Sounds] : normal bowel sounds [Abdomen Soft] : soft [Abnormal Walk] : normal gait [Nail Clubbing] : no clubbing of the fingernails [Cyanosis, Localized] : no localized cyanosis [] : no rash [Oriented To Time, Place, And Person] : oriented to person, place, and time [No Anxiety] : not feeling anxious [5th Left ICS - MCL] : palpated at the 5th LICS in the midclavicular line [Normal] : normal [Rhythm Regular] : regular [Normal S1] : normal S1 [Normal S2] : normal S2 [I] : a grade 1 [2+] : left 2+ [___ +] : bilateral [unfilled]U+ pretibial pitting edema [FreeTextEntry1] : left base with slight expiratory ronchi. otherwsie CTAP elsewhere.

## 2019-09-26 NOTE — HISTORY OF PRESENT ILLNESS
[FreeTextEntry1] : Cough for last 3-4 days, no chest pain, no fever, no chills, or rapid heart beats. He states he feels like this when he is getting a bronchitis.  Discussed with him seeing pulmonary as he needs to start treating his sleep apnea as well as follow up nodules that had been seen in the past. No orthopnea, PND, or LE edema,

## 2019-10-16 ENCOUNTER — APPOINTMENT (OUTPATIENT)
Dept: CARDIOLOGY | Facility: CLINIC | Age: 64
End: 2019-10-16
Payer: COMMERCIAL

## 2019-10-16 ENCOUNTER — NON-APPOINTMENT (OUTPATIENT)
Age: 64
End: 2019-10-16

## 2019-10-16 VITALS — SYSTOLIC BLOOD PRESSURE: 138 MMHG | DIASTOLIC BLOOD PRESSURE: 72 MMHG | HEART RATE: 67 BPM | OXYGEN SATURATION: 97 %

## 2019-10-16 VITALS — HEIGHT: 73 IN | BODY MASS INDEX: 40.69 KG/M2 | WEIGHT: 307 LBS

## 2019-10-16 DIAGNOSIS — G47.33 OBSTRUCTIVE SLEEP APNEA (ADULT) (PEDIATRIC): ICD-10-CM

## 2019-10-16 DIAGNOSIS — Z87.09 PERSONAL HISTORY OF OTHER DISEASES OF THE RESPIRATORY SYSTEM: ICD-10-CM

## 2019-10-16 PROCEDURE — 99214 OFFICE O/P EST MOD 30 MIN: CPT | Mod: 25

## 2019-10-16 PROCEDURE — 93000 ELECTROCARDIOGRAM COMPLETE: CPT

## 2019-10-16 NOTE — DISCUSSION/SUMMARY
[FreeTextEntry1] : Bronchitis resolved with abx.\par Sleep apnea: seeing pulmonary and mask to come next week. \par Crescendo angina/Chest pain/CAD/Prior PCI: Stable and doing well, s/p PCI. Continue current medical regime.   \par ADHF: Continue current regime. Use diuretic as much as possible.\par HTN: Much better and controlled. Low Sodium diet discussed. \par Hyperlipidemia: Labs when he returns. Low fat diet discussed.\par F/u in 3 months.\par \par \par

## 2019-10-16 NOTE — PHYSICAL EXAM
[General Appearance - Well Developed] : well developed [General Appearance - Well Nourished] : well nourished [Normal Conjunctiva] : the conjunctiva exhibited no abnormalities [Normal Oral Mucosa] : normal oral mucosa [Normal Oropharynx] : normal oropharynx [Normal Jugular Venous A Waves Present] : normal jugular venous A waves present [Normal Jugular Venous V Waves Present] : normal jugular venous V waves present [FreeTextEntry1] : left base with slight expiratory ronchi. otherwsie CTAP elsewhere. [Bowel Sounds] : normal bowel sounds [Abdomen Soft] : soft [Abnormal Walk] : normal gait [Nail Clubbing] : no clubbing of the fingernails [Cyanosis, Localized] : no localized cyanosis [] : no rash [Oriented To Time, Place, And Person] : oriented to person, place, and time [No Anxiety] : not feeling anxious [5th Left ICS - MCL] : palpated at the 5th LICS in the midclavicular line [Normal] : normal [Rhythm Regular] : regular [Normal S1] : normal S1 [Normal S2] : normal S2 [I] : a grade 1 [2+] : left 2+ [___ +] : bilateral [unfilled]U+ pretibial pitting edema

## 2019-10-16 NOTE — HISTORY OF PRESENT ILLNESS
[FreeTextEntry1] : Much better after recovering from bronchitis and BP better on this regime. still with edema but cannot take it everyday due to his job and logistics but still helping and keeping him under control.

## 2019-10-18 ENCOUNTER — MEDICATION RENEWAL (OUTPATIENT)
Age: 64
End: 2019-10-18

## 2019-10-18 DIAGNOSIS — E55.9 VITAMIN D DEFICIENCY, UNSPECIFIED: ICD-10-CM

## 2019-10-18 LAB
25(OH)D3 SERPL-MCNC: 24 NG/ML
ALBUMIN SERPL ELPH-MCNC: 4.4 G/DL
ALP BLD-CCNC: 148 U/L
ALT SERPL-CCNC: 22 U/L
ANION GAP SERPL CALC-SCNC: 21 MMOL/L
AST SERPL-CCNC: 17 U/L
BASOPHILS # BLD AUTO: 0.06 K/UL
BASOPHILS NFR BLD AUTO: 0.8 %
BILIRUB SERPL-MCNC: 0.4 MG/DL
BUN SERPL-MCNC: 16 MG/DL
CALCIUM SERPL-MCNC: 9.3 MG/DL
CHLORIDE SERPL-SCNC: 103 MMOL/L
CHOLEST SERPL-MCNC: 131 MG/DL
CHOLEST/HDLC SERPL: 3.5 RATIO
CK SERPL-CCNC: 91 U/L
CO2 SERPL-SCNC: 15 MMOL/L
CREAT SERPL-MCNC: 0.91 MG/DL
EOSINOPHIL # BLD AUTO: 0.16 K/UL
EOSINOPHIL NFR BLD AUTO: 2.2 %
ESTIMATED AVERAGE GLUCOSE: 200 MG/DL
GLUCOSE SERPL-MCNC: 328 MG/DL
HBA1C MFR BLD HPLC: 8.6 %
HCT VFR BLD CALC: 40.3 %
HDLC SERPL-MCNC: 37 MG/DL
HGB BLD-MCNC: 13.1 G/DL
IMM GRANULOCYTES NFR BLD AUTO: 0.4 %
LDLC SERPL CALC-MCNC: 54 MG/DL
LDLC SERPL DIRECT ASSAY-MCNC: 79 MG/DL
LYMPHOCYTES # BLD AUTO: 1.19 K/UL
LYMPHOCYTES NFR BLD AUTO: 16.1 %
MAN DIFF?: NORMAL
MCHC RBC-ENTMCNC: 28.1 PG
MCHC RBC-ENTMCNC: 32.5 GM/DL
MCV RBC AUTO: 86.5 FL
MONOCYTES # BLD AUTO: 0.55 K/UL
MONOCYTES NFR BLD AUTO: 7.5 %
NEUTROPHILS # BLD AUTO: 5.39 K/UL
NEUTROPHILS NFR BLD AUTO: 73 %
PLATELET # BLD AUTO: 295 K/UL
POTASSIUM SERPL-SCNC: 4.2 MMOL/L
PROT SERPL-MCNC: 7.1 G/DL
RBC # BLD: 4.66 M/UL
RBC # FLD: 14.1 %
SODIUM SERPL-SCNC: 139 MMOL/L
TRIGL SERPL-MCNC: 200 MG/DL
TSH SERPL-ACNC: 1.19 UIU/ML
WBC # FLD AUTO: 7.38 K/UL

## 2019-10-18 RX ORDER — ERGOCALCIFEROL (VITAMIN D2) 1250 MCG
50000 CAPSULE ORAL
Refills: 0 | Status: DISCONTINUED | COMMUNITY
End: 2019-10-18

## 2019-10-18 RX ORDER — MULTIVIT-MIN/FOLIC/VIT K/LYCOP 400-300MCG
25 MCG TABLET ORAL DAILY
Qty: 90 | Refills: 0 | Status: ACTIVE | COMMUNITY
Start: 2019-10-18

## 2019-10-21 ENCOUNTER — RX RENEWAL (OUTPATIENT)
Age: 64
End: 2019-10-21

## 2019-11-07 NOTE — DISCHARGE NOTE ADULT - ABILITY TO HEAR (WITH HEARING AID OR HEARING APPLIANCE IF NORMALLY USED):
Quality 226: Preventive Care And Screening: Tobacco Use: Screening And Cessation Intervention: Patient screened for tobacco use and is an ex/non-smoker Quality 111:Pneumonia Vaccination Status For Older Adults: Pneumococcal Vaccination Previously Received Quality 110: Preventive Care And Screening: Influenza Immunization: Influenza Immunization previously received during influenza season Quality 431: Preventive Care And Screening: Unhealthy Alcohol Use - Screening: Patient screened for unhealthy alcohol use using a single question and scores less than 2 times per year Detail Level: Detailed Adequate: hears normal conversation without difficulty

## 2019-11-08 ENCOUNTER — RX RENEWAL (OUTPATIENT)
Age: 64
End: 2019-11-08

## 2019-11-08 RX ORDER — EZETIMIBE 10 MG/1
10 TABLET ORAL
Qty: 30 | Refills: 11 | Status: ACTIVE | COMMUNITY
Start: 2018-11-16 | End: 1900-01-01

## 2019-11-21 ENCOUNTER — RX RENEWAL (OUTPATIENT)
Age: 64
End: 2019-11-21

## 2019-11-21 ENCOUNTER — MEDICATION RENEWAL (OUTPATIENT)
Age: 64
End: 2019-11-21

## 2019-12-20 ENCOUNTER — RX RENEWAL (OUTPATIENT)
Age: 64
End: 2019-12-20

## 2020-02-10 ENCOUNTER — INPATIENT (INPATIENT)
Facility: HOSPITAL | Age: 65
LOS: 1 days | Discharge: HOME | DRG: 251 | End: 2020-02-12
Attending: INTERNAL MEDICINE | Admitting: INTERNAL MEDICINE
Payer: COMMERCIAL

## 2020-02-10 VITALS
DIASTOLIC BLOOD PRESSURE: 83 MMHG | RESPIRATION RATE: 18 BRPM | SYSTOLIC BLOOD PRESSURE: 169 MMHG | HEART RATE: 90 BPM | TEMPERATURE: 98 F | OXYGEN SATURATION: 99 %

## 2020-02-10 DIAGNOSIS — Z98.890 OTHER SPECIFIED POSTPROCEDURAL STATES: Chronic | ICD-10-CM

## 2020-02-10 DIAGNOSIS — Z95.5 PRESENCE OF CORONARY ANGIOPLASTY IMPLANT AND GRAFT: Chronic | ICD-10-CM

## 2020-02-10 DIAGNOSIS — R07.9 CHEST PAIN, UNSPECIFIED: ICD-10-CM

## 2020-02-10 LAB
ALBUMIN SERPL ELPH-MCNC: 4.4 G/DL — SIGNIFICANT CHANGE UP (ref 3.3–5)
ALP SERPL-CCNC: 105 U/L — SIGNIFICANT CHANGE UP (ref 40–120)
ALT FLD-CCNC: 20 U/L — SIGNIFICANT CHANGE UP (ref 10–45)
ANION GAP SERPL CALC-SCNC: 13 MMOL/L — SIGNIFICANT CHANGE UP (ref 5–17)
AST SERPL-CCNC: 11 U/L — SIGNIFICANT CHANGE UP (ref 10–40)
BILIRUB SERPL-MCNC: 0.4 MG/DL — SIGNIFICANT CHANGE UP (ref 0.2–1.2)
BUN SERPL-MCNC: 15 MG/DL — SIGNIFICANT CHANGE UP (ref 7–23)
CALCIUM SERPL-MCNC: 9.8 MG/DL — SIGNIFICANT CHANGE UP (ref 8.4–10.5)
CHLORIDE SERPL-SCNC: 102 MMOL/L — SIGNIFICANT CHANGE UP (ref 96–108)
CO2 SERPL-SCNC: 23 MMOL/L — SIGNIFICANT CHANGE UP (ref 22–31)
CREAT SERPL-MCNC: 0.78 MG/DL — SIGNIFICANT CHANGE UP (ref 0.5–1.3)
GAS PNL BLDV: SIGNIFICANT CHANGE UP
GLUCOSE SERPL-MCNC: 192 MG/DL — HIGH (ref 70–99)
HCT VFR BLD CALC: 40.8 % — SIGNIFICANT CHANGE UP (ref 39–50)
HGB BLD-MCNC: 13 G/DL — SIGNIFICANT CHANGE UP (ref 13–17)
LIDOCAIN IGE QN: 25 U/L — SIGNIFICANT CHANGE UP (ref 7–60)
MAGNESIUM SERPL-MCNC: 2.1 MG/DL — SIGNIFICANT CHANGE UP (ref 1.6–2.6)
MCHC RBC-ENTMCNC: 27.1 PG — SIGNIFICANT CHANGE UP (ref 27–34)
MCHC RBC-ENTMCNC: 31.9 GM/DL — LOW (ref 32–36)
MCV RBC AUTO: 85 FL — SIGNIFICANT CHANGE UP (ref 80–100)
NRBC # BLD: 0 /100 WBCS — SIGNIFICANT CHANGE UP (ref 0–0)
NT-PROBNP SERPL-SCNC: 96 PG/ML — SIGNIFICANT CHANGE UP (ref 0–300)
PLATELET # BLD AUTO: 276 K/UL — SIGNIFICANT CHANGE UP (ref 150–400)
POTASSIUM SERPL-MCNC: 3.8 MMOL/L — SIGNIFICANT CHANGE UP (ref 3.5–5.3)
POTASSIUM SERPL-SCNC: 3.8 MMOL/L — SIGNIFICANT CHANGE UP (ref 3.5–5.3)
PROT SERPL-MCNC: 7.2 G/DL — SIGNIFICANT CHANGE UP (ref 6–8.3)
RBC # BLD: 4.8 M/UL — SIGNIFICANT CHANGE UP (ref 4.2–5.8)
RBC # FLD: 14.3 % — SIGNIFICANT CHANGE UP (ref 10.3–14.5)
SODIUM SERPL-SCNC: 138 MMOL/L — SIGNIFICANT CHANGE UP (ref 135–145)
TROPONIN T, HIGH SENSITIVITY RESULT: 11 NG/L — SIGNIFICANT CHANGE UP (ref 0–51)
TROPONIN T, HIGH SENSITIVITY RESULT: 11 NG/L — SIGNIFICANT CHANGE UP (ref 0–51)
WBC # BLD: 7.64 K/UL — SIGNIFICANT CHANGE UP (ref 3.8–10.5)
WBC # FLD AUTO: 7.64 K/UL — SIGNIFICANT CHANGE UP (ref 3.8–10.5)

## 2020-02-10 PROCEDURE — 93010 ELECTROCARDIOGRAM REPORT: CPT | Mod: NC

## 2020-02-10 PROCEDURE — 99223 1ST HOSP IP/OBS HIGH 75: CPT

## 2020-02-10 PROCEDURE — 99284 EMERGENCY DEPT VISIT MOD MDM: CPT

## 2020-02-10 PROCEDURE — 71046 X-RAY EXAM CHEST 2 VIEWS: CPT | Mod: 26

## 2020-02-10 RX ORDER — ASPIRIN/CALCIUM CARB/MAGNESIUM 324 MG
243 TABLET ORAL ONCE
Refills: 0 | Status: COMPLETED | OUTPATIENT
Start: 2020-02-10 | End: 2020-02-10

## 2020-02-10 RX ADMIN — Medication 243 MILLIGRAM(S): at 21:37

## 2020-02-10 NOTE — H&P ADULT - PROBLEM SELECTOR PLAN 1
Patient currently asymptomatic.  Cardiac enzymes currently not suggestive of ACS  EKG currently without ischemic changes  No ectopy thus far on tele  Trend CE  Check TTE  Per ED team, cardiology fellow assessed patient: no plan of urgent cath or need for heparin gtt at this time. F/U Full cardiology consult  Continue Asprin  Continue Plavix  Continue Ranexa

## 2020-02-10 NOTE — H&P ADULT - NSHPLABSRESULTS_GEN_ALL_CORE
Personally reviewed labs and noted in detail below: HsTrop 11 and 11. low BNP 96    Personally reviewed CXR: no appreciable consolidations or pulmonary edema    Personally reviewed EKG: SR 66 bpm QTc 547 no ST segment change or  Twave changes

## 2020-02-10 NOTE — H&P ADULT - ASSESSMENT
65 yo man with PMH of HTN, HLD, DMT2, CAD with reported 23 stents (last stent in May 2019), pulmonary nodules (being monitored with outpt ct) presents from home in setting of chest pain.

## 2020-02-10 NOTE — H&P ADULT - HISTORY OF PRESENT ILLNESS
65 yo man with PMH of HTN, HLD, DMT2, CAD with reported 23 stents, pulmonary nodules (being monitored with outpt ct) presents from home in setting of chest pain. 65 yo man with PMH of HTN, HLD, DMT2, CAD with reported 23 stents (last stent in May 2019), pulmonary nodules (being monitored with outpt ct) presents from home in setting of chest pain. Patient states that he had ~6-7 episodes of sharp/electric-like left sided chest pain which last for a few seconds throughout the course of the day. Pain was not related with exertion. Denies radiation of pain. Endorses associated diaphoresis. Denies shortness of breath or palpitations. Denies symptoms of nausea/emesis, dizziness. Patient states this sensation is new and different from previous chest pain presentations. Patient states he chronically has symptoms of chest pressure with mild radiation to both arm and typically prompted with exertion. Also endorses dyspnea on exertion. Patient also has chronic LE edema for which he started taking Furosemide the past few weeks. Patient states he is not always compliant as he sometimes forgets to take it. He states that LE edema is improved. Denies orthopnea. Patient is compliant with Aspirin and Plavix. Patient called his PMD and was referred to go to the ED for further evaluation

## 2020-02-10 NOTE — ED PROVIDER NOTE - NOTES
cards fellow alejo aguirre consulted in regards to pt. discussed case. she states pt trop neg and is unclear why pt needs consult. pt only has trop x 1 at this time. advised pt cardiologist is aware of pt and will likely cath him given history. pt tba medicine. she states will see pt "because attending wants"

## 2020-02-10 NOTE — H&P ADULT - PROBLEM SELECTOR PLAN 4
Per patient: outpatient doctors prescribed:  Lipitor 160 mg qday and 80 mg qhs  Zetia 10 daily  Confirm med rec with outpatient doc/pharmacy  Will continue with Lipitor 80 and Zetia for now  Check lipid panel Per patient: outpatient doctors prescribed:  Lipitor 160 mg qday and 80 mg qhs   Clarify dosing with PMD  Will continue with Lipitor 80 qHS for now  Zetia 10 daily  Confirm med rec with outpatient doc/pharmacy  Will continue with Lipitor 80 and Zetia for now  Check lipid panel

## 2020-02-10 NOTE — ED PROVIDER NOTE - ATTENDING CONTRIBUTION TO CARE
Attending MD Guardado:   I personally have seen and examined this patient.  ACP, Resident, medical student note reviewed and agree on plan of care and except where noted.     64y M PMH HTN, HLD, CAD s/p multiple stents presents with dyspnea on exertion, chest pain, LE edema.     on exam patient well apearing, vitals wnl, rrr s1s2, lungs clear, abdomen soft, no pitting peripheral edema, A+Ox3, fluid speech.    High risk cardiac patient. Concern for ACS, CHF. Will obtain labs, imaging, cards consult, likely admit for cardiac rule out.

## 2020-02-10 NOTE — H&P ADULT - ATTENDING COMMENTS
Dr. Fatimah Mcgee accepted patient's case from the ED and requested in house hospitalist team to complete admission. Patient was previously unknown to me. Patient was assigned to me by hospitalist in charge. My involvement in this case consisted only of the initial history, physical and management plan. Dr. Mcgee to assume care in AM and thereafter. Case discussed in detail with overnight medicine NP/NOAH Adams Dr. Fatimah Mcgee accepted patient's case from the ED and requested in house hospitalist team to complete admission. Patient was previously unknown to me. Patient was assigned to me by hospitalist in charge. My involvement in this case consisted only of the initial history, physical and management plan. Dr. Mcgee to assume care in AM and thereafter. Case discussed in detail with overnight medicine NP/PA Bryan [id number]

## 2020-02-10 NOTE — ED ADULT NURSE NOTE - PSH
History of coronary artery stent placement  Last stent was 2 weeks ago  History of lumbar discectomy    History of lumbar laminectomy    S/P coronary artery stent placement  multiple stents,

## 2020-02-10 NOTE — ED ADULT NURSE NOTE - NS ED NURSE RECORD ANOTHER VITAL SIGN
MEDICATION FOR FEVER OR PAIN:   Acetaminophen liquid (for example Tylenol or Tempra) may be given every 4 hours as needed for pain or fever. Acetaminophen liquid is less concentrated than the infant dropper bottle type.      INFANT Tylenol/Acetaminophen  Drops (160 mg/5 mL)    Child’s Weight: Dose:  18 - 23 pounds:   120 mg (3.75 mL (3/4 Teaspoon))  23 - 27 pounds:   160 mg (5.0 mL (1 Teaspoon))    CHILDREN’S Tylenol/Acetaminophen  (160 mg/5 mL)    Child’s Weight: Dose:  18 - 23 pounds:   120 mg (3.75 mL (3/4 Teaspoon))  23 - 27 pounds:   160 mg (5.0 mL (1 Teaspoon))      CHILDREN'S Ibuprofen (100 mg/5 mL) liquid (for example Advil or Motrin) may be given every 6 hours as needed for pain or fever.    Child’s Weight: Dose:  18 - 23 pounds:   1/2 - 1 Teaspoon  23 - 27 pounds:   100 mg (1 Teaspoon)    If Kethan is outside these weight ranges, call your pediatrician's office for advice.           There is no immunization history on file for this patient.     Yes

## 2020-02-10 NOTE — ED PROVIDER NOTE - PROGRESS NOTE DETAILS
Spoke to pt cards Dr. Guerra, discusssed case. he recommends admission for likely cath. Asks for house cards to see and to admit pt to wayne Guzman PA-C

## 2020-02-10 NOTE — H&P ADULT - NSHPREVIEWOFSYSTEMS_GEN_ALL_CORE
REVIEW OF SYSTEMS:  CONSTITUTIONAL: No fever, chills or sweats  EYES: No eye pain or  visual disturbances  ENMT: No sinus congestions, rhinorrhea, or throat pain  RESPIRATORY: +intermittent dry cough. No SOB at rest  CARDIOVASCULAR: See HPI  GASTROINTESTINAL: No abdominal pain. No nausea, vomiting, or hematemesis; No diarrhea or constipation. No melena or hematochezia.  GENITOURINARY: No dysuria, or change of frequency  NEUROLOGICAL: No headaches, memory loss, loss of strength, numbness, or tremors  SKIN: No rashes or lesions   LYMPH NODES: No enlarged glands  ENDOCRINE: No heat or cold intolerance  MUSCULOSKELETAL: No joint pain or swelling; No muscle, back, or extremity pain  PSYCHIATRIC: No depression or anxiety  HEME/LYMPH: No easy bruising, or bleeding gums

## 2020-02-10 NOTE — H&P ADULT - PROBLEM SELECTOR PLAN 3
per patient directed to take:  Amlodipine 10mg qAM & 15 mg qPM  Metoprolol 50 mg BID  Losartan 50 qAM  Primary day team to confirm with outpatient doc/pharmacy per patient directed to take:  Amlodipine 10mg qAM & 15 mg qPM  Metoprolol 50 mg BID  Losartan 50 qAM  Primary day team to confirm with outpatient doc/pharmacy  Will continue with Amlodipine 15 mg qHS only, Metoprolol 50 BID and Losartan 50 in AM

## 2020-02-10 NOTE — ED PROVIDER NOTE - RAPID ASSESSMENT
64y M with PMHx of CAD S/P MI, 23 cardiac stents, HTN, HLD c/o 8 episodes of sharp chest pressure radiating down B/L arms with SOB which started today. Reports B/L leg edema and orthopnea, which has been occurring for a few months. Had appt to see PMD on 2/12, but came to ED for evaluation sooner instead. Used to smoke cigarettes until  12/28/2008 and quit since then. Denies alcohol use. Denies abdominal pain, cough, F/C, or N/V.     Rx: Metoprolol, Plavix, Tray ASA, Losartan, HCTZ, Ranolazine, Atorvastatin, Metformin, Amlodipine, Plavix.    **Pt seen in waiting room by Gus RODNEY), documentation completed by Vanesas Millan. Pt to be sent to main ED for further evaluation - all orders placed to be followed by MD in the main ED** 64y M with PMHx of CAD S/P MI, 23 cardiac stents, HTN, HLD c/o 8 episodes of sharp chest pressure radiating down B/L arms with SOB which started today. Reports B/L leg edema and orthopnea, which has been occurring for a few months. +CAGE with minimal exertion, +exertional CP. Had appt to see PMD on 2/12, but came to ED for evaluation sooner instead. Used to smoke cigarettes until  12/28/2008 and quit since then. Denies alcohol use. Denies abdominal pain, cough, F/C, or N/V. Sxs similar to those experienced prior to previous stent. Not on AC.    Rx: Metoprolol, Plavix, Tray ASA, Losartan, HCTZ, Ranolazine, Atorvastatin, Metformin, Amlodipine, Plavix.    **Pt seen in waiting room by Gus RODNEY), documentation completed by Vanessa Millan. Pt to be sent to main ED for further evaluation - all orders placed to be followed by MD in the main ED**

## 2020-02-10 NOTE — ED ADULT NURSE NOTE - NSHOSCREENINGQ1_ED_ALL_ED
----- Message from Marzena Crocker MD sent at 4/15/2017 12:53 PM CDT -----  Please notify CBC normal. Wbc had improved.
Called patient to inform him of his results per Dr. Vanessa Christine  No answer left a message
No

## 2020-02-10 NOTE — ED ADULT NURSE NOTE - OBJECTIVE STATEMENT
63 YO male PMHx CAD s/p 23 stents, last put in May 2019 c/o mid-sternal intermittent CP, non-radiating. Patient reports that pain intensifies with minimal exertion with associated air hunger and SOB. Patient reports that he also has increased swelling to b/l LE's, non-compliant with diuretic. Patient is A&OX3, well appearing, ambulatory speaking in full sentences. Placed on CM, NSR in 60's. denies HA, N/V/D, abdominal pain, fever/chills, urinary symptoms, hematuria, weakness, dizziness, numbness, tinging. Peripheral pulses present b/l. Skin warm, dry and pink. Pt placed in position of comfort. Pt educated on call bell system and provided call bell. Bed in lowest position, wheels locked, appropriate side rails raised. Pt denies needs at this time.

## 2020-02-10 NOTE — ED PROVIDER NOTE - PHYSICAL EXAMINATION
CONSTITUTIONAL: Patient is awake, alert and oriented x 3. Patient is well appearing and in no acute distress.  HEAD: NCAT,   NECK: supple  LUNGS: CTA B/L,  HEART: RRR.+S1S2 no   ABDOMEN: Soft nd/nt+bs no rebound or guarding.   EXTREMITY: no edema or calf tenderness b/l, FROM upper and lower ext b/l  NEURO: No focal deficits

## 2020-02-10 NOTE — H&P ADULT - NSHPPHYSICALEXAM_GEN_ALL_CORE
Vital Signs Last 24 Hrs  T(C): 36.7 (10 Feb 2020 19:33), Max: 36.7 (10 Feb 2020 19:33)  T(F): 98 (10 Feb 2020 19:33), Max: 98 (10 Feb 2020 19:33)  HR: 77 (10 Feb 2020 19:36) (77 - 90)  BP: 158/84 (10 Feb 2020 19:36) (158/84 - 169/83)  BP(mean): --  RR: 18 (10 Feb 2020 19:36) (18 - 18)  SpO2: 99% (10 Feb 2020 19:36) (99% - 99%)    TELE  SR 60s      PHYSICAL EXAM:  GENERAL: NAD, well-groomed, well-developed  HEAD:  Atraumatic, Normocephalic  EYES: EOMI, PERRLA, conjunctiva and sclera clear  NERVOUS SYSTEM:  Alert & Oriented X3, Good concentration; Motor Strength 5/5 B/L upper and lower extremities  CHEST/LUNG: Clear to percussion bilaterally; No rales, rhonchi, or wheezing   HEART: Regular rate and rhythm +S1 S2; No murmurs, rubs, or gallops  ABDOMEN: Soft, Nontender, Nondistended; Bowel sounds present  EXTREMITIES:  2+ radial pulses, No clubbing, cyanosis. +1 pitting edema up to ankles  LYMPH: No cervical or supraclavicular lymphadenopathy noted  SKIN: Warm and Dry. No rashes or lesions

## 2020-02-10 NOTE — ED PROVIDER NOTE - OBJECTIVE STATEMENT
64 year old male with pmhx CAD s/p 23 cardiac stents most recent stents x 2 5/6/19 presents to ED c/o chest pain 64 year old male with pmhx CAD s/p 23 cardiac stents most recent stents x 2 5/6/19 presents to ED c/o chest pain today. Patient reports that he has had 6-8 episodes sharp left sided chest pressure associated w/ SOB and radiation down b/l arms today. He states over past few months has had progressive worsening CAGE as well. He has noticed b/l LE edema which waxes and wanes and admits to non-compliance with his diuretics. Pt scheduled to see PCP 2/12 in regards to SOB but came to ED after chest pain today. Denies HA, dizziness, fevers, chills, abd pain, n/v

## 2020-02-11 ENCOUNTER — TRANSCRIPTION ENCOUNTER (OUTPATIENT)
Age: 65
End: 2020-02-11

## 2020-02-11 DIAGNOSIS — I10 ESSENTIAL (PRIMARY) HYPERTENSION: ICD-10-CM

## 2020-02-11 DIAGNOSIS — E78.5 HYPERLIPIDEMIA, UNSPECIFIED: ICD-10-CM

## 2020-02-11 DIAGNOSIS — R07.9 CHEST PAIN, UNSPECIFIED: ICD-10-CM

## 2020-02-11 DIAGNOSIS — Z29.9 ENCOUNTER FOR PROPHYLACTIC MEASURES, UNSPECIFIED: ICD-10-CM

## 2020-02-11 DIAGNOSIS — E11.9 TYPE 2 DIABETES MELLITUS WITHOUT COMPLICATIONS: ICD-10-CM

## 2020-02-11 LAB
ANION GAP SERPL CALC-SCNC: 11 MMOL/L — SIGNIFICANT CHANGE UP (ref 5–17)
BASOPHILS # BLD AUTO: 0.04 K/UL — SIGNIFICANT CHANGE UP (ref 0–0.2)
BASOPHILS NFR BLD AUTO: 0.6 % — SIGNIFICANT CHANGE UP (ref 0–2)
BLD GP AB SCN SERPL QL: NEGATIVE — SIGNIFICANT CHANGE UP
BUN SERPL-MCNC: 13 MG/DL — SIGNIFICANT CHANGE UP (ref 7–23)
CALCIUM SERPL-MCNC: 9.5 MG/DL — SIGNIFICANT CHANGE UP (ref 8.4–10.5)
CHLORIDE SERPL-SCNC: 103 MMOL/L — SIGNIFICANT CHANGE UP (ref 96–108)
CHOLEST SERPL-MCNC: 132 MG/DL — SIGNIFICANT CHANGE UP (ref 10–199)
CK MB BLD-MCNC: 1.8 % — SIGNIFICANT CHANGE UP (ref 0–3.5)
CK MB CFR SERPL CALC: 1.4 NG/ML — SIGNIFICANT CHANGE UP (ref 0–6.7)
CK MB CFR SERPL CALC: 1.5 NG/ML — SIGNIFICANT CHANGE UP (ref 0–6.7)
CK SERPL-CCNC: 66 U/L — SIGNIFICANT CHANGE UP (ref 30–200)
CK SERPL-CCNC: 77 U/L — SIGNIFICANT CHANGE UP (ref 30–200)
CO2 SERPL-SCNC: 23 MMOL/L — SIGNIFICANT CHANGE UP (ref 22–31)
CREAT SERPL-MCNC: 0.72 MG/DL — SIGNIFICANT CHANGE UP (ref 0.5–1.3)
EOSINOPHIL # BLD AUTO: 0.13 K/UL — SIGNIFICANT CHANGE UP (ref 0–0.5)
EOSINOPHIL NFR BLD AUTO: 1.9 % — SIGNIFICANT CHANGE UP (ref 0–6)
GLUCOSE BLDC GLUCOMTR-MCNC: 111 MG/DL — HIGH (ref 70–99)
GLUCOSE BLDC GLUCOMTR-MCNC: 172 MG/DL — HIGH (ref 70–99)
GLUCOSE BLDC GLUCOMTR-MCNC: 189 MG/DL — HIGH (ref 70–99)
GLUCOSE BLDC GLUCOMTR-MCNC: 199 MG/DL — HIGH (ref 70–99)
GLUCOSE SERPL-MCNC: 182 MG/DL — HIGH (ref 70–99)
HBA1C BLD-MCNC: 8.3 % — HIGH (ref 4–5.6)
HCT VFR BLD CALC: 43 % — SIGNIFICANT CHANGE UP (ref 39–50)
HDLC SERPL-MCNC: 33 MG/DL — LOW
HGB BLD-MCNC: 13.9 G/DL — SIGNIFICANT CHANGE UP (ref 13–17)
IMM GRANULOCYTES NFR BLD AUTO: 0.3 % — SIGNIFICANT CHANGE UP (ref 0–1.5)
LIPID PNL WITH DIRECT LDL SERPL: 65 MG/DL — SIGNIFICANT CHANGE UP
LYMPHOCYTES # BLD AUTO: 1.37 K/UL — SIGNIFICANT CHANGE UP (ref 1–3.3)
LYMPHOCYTES # BLD AUTO: 20.1 % — SIGNIFICANT CHANGE UP (ref 13–44)
MAGNESIUM SERPL-MCNC: 2.2 MG/DL — SIGNIFICANT CHANGE UP (ref 1.6–2.6)
MCHC RBC-ENTMCNC: 27.8 PG — SIGNIFICANT CHANGE UP (ref 27–34)
MCHC RBC-ENTMCNC: 32.3 GM/DL — SIGNIFICANT CHANGE UP (ref 32–36)
MCV RBC AUTO: 86 FL — SIGNIFICANT CHANGE UP (ref 80–100)
MONOCYTES # BLD AUTO: 0.54 K/UL — SIGNIFICANT CHANGE UP (ref 0–0.9)
MONOCYTES NFR BLD AUTO: 7.9 % — SIGNIFICANT CHANGE UP (ref 2–14)
NEUTROPHILS # BLD AUTO: 4.73 K/UL — SIGNIFICANT CHANGE UP (ref 1.8–7.4)
NEUTROPHILS NFR BLD AUTO: 69.2 % — SIGNIFICANT CHANGE UP (ref 43–77)
NRBC # BLD: 0 /100 WBCS — SIGNIFICANT CHANGE UP (ref 0–0)
PHOSPHATE SERPL-MCNC: 3.4 MG/DL — SIGNIFICANT CHANGE UP (ref 2.5–4.5)
PLATELET # BLD AUTO: 279 K/UL — SIGNIFICANT CHANGE UP (ref 150–400)
POTASSIUM SERPL-MCNC: 3.9 MMOL/L — SIGNIFICANT CHANGE UP (ref 3.5–5.3)
POTASSIUM SERPL-SCNC: 3.9 MMOL/L — SIGNIFICANT CHANGE UP (ref 3.5–5.3)
RBC # BLD: 5 M/UL — SIGNIFICANT CHANGE UP (ref 4.2–5.8)
RBC # FLD: 14.3 % — SIGNIFICANT CHANGE UP (ref 10.3–14.5)
RH IG SCN BLD-IMP: NEGATIVE — SIGNIFICANT CHANGE UP
SODIUM SERPL-SCNC: 137 MMOL/L — SIGNIFICANT CHANGE UP (ref 135–145)
TOTAL CHOLESTEROL/HDL RATIO MEASUREMENT: 4 RATIO — SIGNIFICANT CHANGE UP (ref 3.4–9.6)
TRIGL SERPL-MCNC: 172 MG/DL — HIGH (ref 10–149)
TROPONIN T, HIGH SENSITIVITY RESULT: 12 NG/L — SIGNIFICANT CHANGE UP (ref 0–51)
TSH SERPL-MCNC: 2.35 UIU/ML — SIGNIFICANT CHANGE UP (ref 0.27–4.2)
WBC # BLD: 6.83 K/UL — SIGNIFICANT CHANGE UP (ref 3.8–10.5)
WBC # FLD AUTO: 6.83 K/UL — SIGNIFICANT CHANGE UP (ref 3.8–10.5)

## 2020-02-11 PROCEDURE — 93010 ELECTROCARDIOGRAM REPORT: CPT

## 2020-02-11 PROCEDURE — 99232 SBSQ HOSP IP/OBS MODERATE 35: CPT

## 2020-02-11 PROCEDURE — 99233 SBSQ HOSP IP/OBS HIGH 50: CPT | Mod: GC

## 2020-02-11 PROCEDURE — 99233 SBSQ HOSP IP/OBS HIGH 50: CPT | Mod: 25

## 2020-02-11 PROCEDURE — 99223 1ST HOSP IP/OBS HIGH 75: CPT | Mod: GC

## 2020-02-11 PROCEDURE — 93306 TTE W/DOPPLER COMPLETE: CPT | Mod: 26

## 2020-02-11 RX ORDER — EZETIMIBE 10 MG/1
10 TABLET ORAL DAILY
Refills: 0 | Status: DISCONTINUED | OUTPATIENT
Start: 2020-02-11 | End: 2020-02-12

## 2020-02-11 RX ORDER — HEPARIN SODIUM 5000 [USP'U]/ML
5000 INJECTION INTRAVENOUS; SUBCUTANEOUS EVERY 12 HOURS
Refills: 0 | Status: DISCONTINUED | OUTPATIENT
Start: 2020-02-11 | End: 2020-02-11

## 2020-02-11 RX ORDER — INSULIN LISPRO 100/ML
VIAL (ML) SUBCUTANEOUS
Refills: 0 | Status: DISCONTINUED | OUTPATIENT
Start: 2020-02-11 | End: 2020-02-12

## 2020-02-11 RX ORDER — RANOLAZINE 500 MG/1
500 TABLET, FILM COATED, EXTENDED RELEASE ORAL
Refills: 0 | Status: DISCONTINUED | OUTPATIENT
Start: 2020-02-11 | End: 2020-02-12

## 2020-02-11 RX ORDER — GLUCAGON INJECTION, SOLUTION 0.5 MG/.1ML
1 INJECTION, SOLUTION SUBCUTANEOUS ONCE
Refills: 0 | Status: DISCONTINUED | OUTPATIENT
Start: 2020-02-11 | End: 2020-02-12

## 2020-02-11 RX ORDER — ERGOCALCIFEROL 1.25 MG/1
1 CAPSULE ORAL
Qty: 0 | Refills: 0 | DISCHARGE

## 2020-02-11 RX ORDER — DEXTROSE 50 % IN WATER 50 %
12.5 SYRINGE (ML) INTRAVENOUS ONCE
Refills: 0 | Status: DISCONTINUED | OUTPATIENT
Start: 2020-02-11 | End: 2020-02-12

## 2020-02-11 RX ORDER — RANOLAZINE 500 MG/1
500 TABLET, EXTENDED RELEASE ORAL
Qty: 180 | Refills: 3 | Status: ACTIVE | COMMUNITY
Start: 2019-04-17 | End: 1900-01-01

## 2020-02-11 RX ORDER — ATORVASTATIN CALCIUM 80 MG/1
80 TABLET, FILM COATED ORAL AT BEDTIME
Refills: 0 | Status: DISCONTINUED | OUTPATIENT
Start: 2020-02-11 | End: 2020-02-12

## 2020-02-11 RX ORDER — INSULIN LISPRO 100/ML
VIAL (ML) SUBCUTANEOUS AT BEDTIME
Refills: 0 | Status: DISCONTINUED | OUTPATIENT
Start: 2020-02-11 | End: 2020-02-12

## 2020-02-11 RX ORDER — INFLUENZA VIRUS VACCINE 15; 15; 15; 15 UG/.5ML; UG/.5ML; UG/.5ML; UG/.5ML
0.5 SUSPENSION INTRAMUSCULAR ONCE
Refills: 0 | Status: DISCONTINUED | OUTPATIENT
Start: 2020-02-11 | End: 2020-02-12

## 2020-02-11 RX ORDER — DEXTROSE 50 % IN WATER 50 %
25 SYRINGE (ML) INTRAVENOUS ONCE
Refills: 0 | Status: DISCONTINUED | OUTPATIENT
Start: 2020-02-11 | End: 2020-02-12

## 2020-02-11 RX ORDER — OMEGA-3 ACID ETHYL ESTERS 1 G
1 CAPSULE ORAL
Qty: 0 | Refills: 0 | DISCHARGE

## 2020-02-11 RX ORDER — AMLODIPINE BESYLATE 2.5 MG/1
10 TABLET ORAL DAILY
Refills: 0 | Status: DISCONTINUED | OUTPATIENT
Start: 2020-02-11 | End: 2020-02-12

## 2020-02-11 RX ORDER — METFORMIN HYDROCHLORIDE 850 MG/1
1 TABLET ORAL
Qty: 0 | Refills: 0 | DISCHARGE

## 2020-02-11 RX ORDER — LOSARTAN POTASSIUM 100 MG/1
50 TABLET, FILM COATED ORAL DAILY
Refills: 0 | Status: DISCONTINUED | OUTPATIENT
Start: 2020-02-11 | End: 2020-02-12

## 2020-02-11 RX ORDER — AMLODIPINE BESYLATE 2.5 MG/1
1 TABLET ORAL
Qty: 0 | Refills: 0 | DISCHARGE
Start: 2020-02-11

## 2020-02-11 RX ORDER — DEXTROSE 50 % IN WATER 50 %
15 SYRINGE (ML) INTRAVENOUS ONCE
Refills: 0 | Status: DISCONTINUED | OUTPATIENT
Start: 2020-02-11 | End: 2020-02-12

## 2020-02-11 RX ORDER — METOPROLOL TARTRATE 50 MG
50 TABLET ORAL
Refills: 0 | Status: DISCONTINUED | OUTPATIENT
Start: 2020-02-11 | End: 2020-02-12

## 2020-02-11 RX ORDER — AMLODIPINE BESYLATE 2.5 MG/1
15 TABLET ORAL AT BEDTIME
Refills: 0 | Status: DISCONTINUED | OUTPATIENT
Start: 2020-02-11 | End: 2020-02-11

## 2020-02-11 RX ORDER — FUROSEMIDE 40 MG
40 TABLET ORAL DAILY
Refills: 0 | Status: DISCONTINUED | OUTPATIENT
Start: 2020-02-11 | End: 2020-02-12

## 2020-02-11 RX ORDER — AMLODIPINE BESYLATE 2.5 MG/1
3 TABLET ORAL
Qty: 0 | Refills: 0 | DISCHARGE

## 2020-02-11 RX ORDER — ASPIRIN/CALCIUM CARB/MAGNESIUM 324 MG
81 TABLET ORAL DAILY
Refills: 0 | Status: DISCONTINUED | OUTPATIENT
Start: 2020-02-11 | End: 2020-02-12

## 2020-02-11 RX ORDER — CLOPIDOGREL BISULFATE 75 MG/1
75 TABLET, FILM COATED ORAL DAILY
Refills: 0 | Status: DISCONTINUED | OUTPATIENT
Start: 2020-02-11 | End: 2020-02-12

## 2020-02-11 RX ORDER — SODIUM CHLORIDE 9 MG/ML
1000 INJECTION, SOLUTION INTRAVENOUS
Refills: 0 | Status: DISCONTINUED | OUTPATIENT
Start: 2020-02-11 | End: 2020-02-12

## 2020-02-11 RX ADMIN — AMLODIPINE BESYLATE 15 MILLIGRAM(S): 2.5 TABLET ORAL at 01:48

## 2020-02-11 RX ADMIN — Medication 50 MILLIGRAM(S): at 01:48

## 2020-02-11 RX ADMIN — EZETIMIBE 10 MILLIGRAM(S): 10 TABLET ORAL at 21:57

## 2020-02-11 RX ADMIN — Medication 40 MILLIGRAM(S): at 05:58

## 2020-02-11 RX ADMIN — Medication 1: at 09:01

## 2020-02-11 RX ADMIN — ATORVASTATIN CALCIUM 80 MILLIGRAM(S): 80 TABLET, FILM COATED ORAL at 21:57

## 2020-02-11 RX ADMIN — Medication 50 MILLIGRAM(S): at 05:58

## 2020-02-11 RX ADMIN — CLOPIDOGREL BISULFATE 75 MILLIGRAM(S): 75 TABLET, FILM COATED ORAL at 05:58

## 2020-02-11 RX ADMIN — LOSARTAN POTASSIUM 50 MILLIGRAM(S): 100 TABLET, FILM COATED ORAL at 05:58

## 2020-02-11 RX ADMIN — Medication 50 MILLIGRAM(S): at 19:40

## 2020-02-11 RX ADMIN — RANOLAZINE 500 MILLIGRAM(S): 500 TABLET, FILM COATED, EXTENDED RELEASE ORAL at 05:58

## 2020-02-11 RX ADMIN — ATORVASTATIN CALCIUM 80 MILLIGRAM(S): 80 TABLET, FILM COATED ORAL at 01:48

## 2020-02-11 RX ADMIN — AMLODIPINE BESYLATE 10 MILLIGRAM(S): 2.5 TABLET ORAL at 19:39

## 2020-02-11 RX ADMIN — Medication 81 MILLIGRAM(S): at 05:58

## 2020-02-11 RX ADMIN — RANOLAZINE 500 MILLIGRAM(S): 500 TABLET, FILM COATED, EXTENDED RELEASE ORAL at 01:47

## 2020-02-11 RX ADMIN — RANOLAZINE 500 MILLIGRAM(S): 500 TABLET, FILM COATED, EXTENDED RELEASE ORAL at 19:39

## 2020-02-11 NOTE — DISCHARGE NOTE PROVIDER - NSDCFUADDINST_GEN_ALL_CORE_FT
Continue your medications. Do not stop Aspirin or Plavix unless instructed by your cardiologist.  No heavy lifting or pushing/pulling or strenuous activity with procedure arm for 2 weeks. No driving for 2 days. You may shower 24 hours following procedure but no soaking of your wrist in water (such as in a pool, sink, or tub) for 1 week. Check wrist site for bleeding and/or swelling daily following procedure. Call your doctor/cardiologist immediately for bleeding or swelling or if you have increased/persistent pain or drainage at the wrist site or if you have numbness, tingling or blue or white coloring of your hand or fingers.  Follow up with your cardiologist in 1- 2 weeks. You may call [facility] at [phone] or [phone] after office hours and weekends with any questions or concerns following your procedure.

## 2020-02-11 NOTE — DISCHARGE NOTE PROVIDER - NSDCCPCAREPLAN_GEN_ALL_CORE_FT
PRINCIPAL DISCHARGE DIAGNOSIS  Diagnosis: Chest pain  Assessment and Plan of Treatment: s/p laser/balloon angioplasty to distal RCA via RRA. Continue all medications as ordered.

## 2020-02-11 NOTE — CHART NOTE - NSCHARTNOTEFT_GEN_A_CORE
Removal of Radial Band    Pulses in the right upper extremity are palpable. The patient remained in the sitting position. The insertion site was identified and the band deflated per protocol. The radial band was removed slowly. Direct pressure was applied for  __3____ minutes.     Monitoring of the right wrist and both upper extremities including neuro-vascular checks and vital signs every 15 minutes x 4.    Complications: None    Comments: Patient tolerated well. Gauze and Tegaderm dressing applied. Patient instructed not to bear weight on the wrist and to call for help when getting out of bed for the first time after band removal.    Elvie Gama [facility]  Invasive Cardiology  [phone]

## 2020-02-11 NOTE — CONSULT NOTE ADULT - ATTENDING COMMENTS
63 year old man with numerous stents, recurrent symptoms, recent increase presents for coronary angiography by Dr. Guerra planned this evening.

## 2020-02-11 NOTE — DISCHARGE NOTE PROVIDER - NSDCMRMEDTOKEN_GEN_ALL_CORE_FT
amLODIPine 5 mg oral tablet: 2 tab(s) orally once a day  amLODIPine 5 mg oral tablet: 3 tab(s) orally once a day (at bedtime)  aspirin 81 mg oral tablet: 1 tab(s) orally once a day  atorvastatin 80 mg oral tablet: 2 tab(s) orally once a day  atorvastatin 80 mg oral tablet: 1 tab(s) orally once a day (at bedtime)  furosemide 40 mg oral tablet: 1 tab(s) orally once a day  losartan 50 mg oral tablet: 1 tab(s) orally once a day  metFORMIN 500 mg oral tablet: 1 tab(s) orally 2 times a day. RESTART ON  2/14/20  metoprolol tartrate 25 mg oral tablet: 2 tab(s) orally 2 times a day  Plavix 75 mg oral tablet: 1 tab(s) orally once a day  Ranexa 500 mg oral tablet, extended release: 1 tab(s) orally 2 times a day  Zetia 10 mg oral tablet: 1 tab(s) orally once a day amLODIPine 10 mg oral tablet: 1 tab(s) orally once a day  aspirin 81 mg oral tablet: 1 tab(s) orally once a day  atorvastatin 80 mg oral tablet: 1 tab(s) orally once a day (at bedtime)  furosemide 40 mg oral tablet: 1 tab(s) orally once a day  losartan 50 mg oral tablet: 1 tab(s) orally once a day  metFORMIN 500 mg oral tablet: 1 tab(s) orally 2 times a day. RESTART ON  2/14/20  metoprolol tartrate 25 mg oral tablet: 2 tab(s) orally 2 times a day  Plavix 75 mg oral tablet: 1 tab(s) orally once a day  Ranexa 500 mg oral tablet, extended release: 1 tab(s) orally 2 times a day  Zetia 10 mg oral tablet: 1 tab(s) orally once a day

## 2020-02-11 NOTE — PATIENT PROFILE ADULT - FUNCTIONAL SCREEN CURRENT LEVEL: BATHING, MLM
From: Esther Collier  To: Bridgette Torrez MD  Sent: 9/19/2019 8:19 AM CDT  Subject: Medication Question    I am very concerned about taking Ranitidine that you prescribed for me. There was a report that Zantac generics contain carcinogens. I have been taking this for several months now and I am very concerned about my health. I am going to stop taking it, however what do I need to do to get Tested for NDMA. I've already had cancer once I don't want to get it again!    Please Advise!!!  
Looks like this was prescribed by Dr. Chua. This is a new finding and at this point we do not know the consequences. If she is concerned, would recommend switching to famotidine 40mg QHS. #30, 6 refills  
Message to AMY Noe RN 09/20/19 10:11 AM      
Patient notified.  Medication ordered.  Gay Catherine RN 09/23/19 3:50 PM      
0 = independent

## 2020-02-11 NOTE — PROGRESS NOTE ADULT - PROBLEM SELECTOR PLAN 1
recurrent chest pain s/p PCI of RCA.  Continue dual antiplatlet therapy that he is currently using.  Probable dc in am.  outpt. fu with me in 2 weeks.

## 2020-02-11 NOTE — DISCHARGE NOTE PROVIDER - CARE PROVIDER_API CALL
Jay Guerra (MD)  Cardiovascular Disease  [address] [place] [address]  Phone: [phone]  Fax: [phone]  Follow Up Time:

## 2020-02-11 NOTE — DISCHARGE NOTE PROVIDER - HOSPITAL COURSE
65 yo man with PMH of HTN, HLD, DMT2, CAD with reported 23 stents (last stent in May 2019), pulmonary nodules (being monitored with outpt ct) presents from home in setting of chest pain. Patient states that he had ~6-7 episodes of sharp/electric-like left sided chest pain which last for a few seconds throughout the course of the day. Pain was not related with exertion. Denies radiation of pain. Endorses associated diaphoresis. Denies shortness of breath or palpitations. Denies symptoms of nausea/emesis, dizziness. Patient states this sensation is new and different from previous chest pain presentations. Patient states he chronically has symptoms of chest pressure with mild radiation to both arm and typically prompted with exertion. Also endorses dyspnea on exertion. Patient also has chronic LE edema for which he started taking Furosemide the past few weeks. Patient states he is not always compliant as he sometimes forgets to take it. He states that LE edema is improved. Denies orthopnea. Patient is compliant with Aspirin and Plavix. Patient called his PMD and was referred to go to the ED for further evaluation        2/11: laser/POBA to RCA via RRA. Continue aspirin and Plavix.    2/12: RRA site benign; patient stable for discharge home and will follow up with Dr. Guerra in 2 weeks.

## 2020-02-11 NOTE — CONSULT NOTE ADULT - ASSESSMENT
63yr old male PMH HTN, HLD, T2DM, CAD, s/p multiple stents (last stent in 5/2019) who c/o continued chest pain/ angina and sob with exertion although starting Ranexa and increased BP dose presented for cardiac cath with possible PCI. Unstable angina with CAD hx and multivessel PCI in past with instent restenosis with successful PCI of RCA most recently in 5/2019 now with borderline Low NL LV FX.    #Chest Pain  -Change in character in comparison to exertional chest pressure he has been experiencing for the past 4-6 months.  -According to his OP cardiologist, his anginal equivalent for pain has changed prior to each of his LHC  -Has total of 23 stents, according to wife at bedside, for significant MVD  -Patient has been having chest pains since this AM with presenting troponin 11  -Euvolemic on exam with no electrical instability or active chest pain at the time of my evaluation; was HD stable  -Continue ASA, Plavix; continue Norvasc, Lipitor, Lasix 40mg PO daily, and losartan 50mg PO daily  -Will explore option for repeat LHC in the AM    Cardiology will follow.    Lolly Crowder MD PGY-4  Cardiology Fellow

## 2020-02-11 NOTE — PROGRESS NOTE ADULT - SUBJECTIVE AND OBJECTIVE BOX
no cp/sob  REVIEW OF SYSTEMS:  GEN: no fever,    no chills  RESP: no SOB,   no cough  CVS: no chest pain,   no palpitations  GI: no abdominal pain,   no nausea,   no vomiting,   no constipation,   no diarrhea  : no dysuria,   no frequency  NEURO: no headache,   no dizziness  PSYCH: no depression,   not anxious  Derm : no rash    MEDICATIONS  (STANDING):  amLODIPine   Tablet 15 milliGRAM(s) Oral at bedtime  aspirin enteric coated 81 milliGRAM(s) Oral daily  atorvastatin 80 milliGRAM(s) Oral at bedtime  clopidogrel Tablet 75 milliGRAM(s) Oral daily  dextrose 5%. 1000 milliLiter(s) (50 mL/Hr) IV Continuous <Continuous>  dextrose 50% Injectable 12.5 Gram(s) IV Push once  dextrose 50% Injectable 25 Gram(s) IV Push once  dextrose 50% Injectable 25 Gram(s) IV Push once  ezetimibe 10 milliGRAM(s) Oral daily  furosemide    Tablet 40 milliGRAM(s) Oral daily  influenza   Vaccine 0.5 milliLiter(s) IntraMuscular once  insulin lispro (HumaLOG) corrective regimen sliding scale   SubCutaneous three times a day before meals  insulin lispro (HumaLOG) corrective regimen sliding scale   SubCutaneous at bedtime  losartan 50 milliGRAM(s) Oral daily  metoprolol tartrate 50 milliGRAM(s) Oral two times a day  ranolazine 500 milliGRAM(s) Oral two times a day    MEDICATIONS  (PRN):  dextrose 40% Gel 15 Gram(s) Oral once PRN Blood Glucose LESS THAN 70 milliGRAM(s)/deciliter  glucagon  Injectable 1 milliGRAM(s) IntraMuscular once PRN Glucose LESS THAN 70 milligrams/deciliter      Vital Signs Last 24 Hrs  T(C): 36.4 (11 Feb 2020 05:59), Max: 36.7 (10 Feb 2020 19:33)  T(F): 97.6 (11 Feb 2020 05:59), Max: 98.1 (11 Feb 2020 01:12)  HR: 62 (11 Feb 2020 05:59) (61 - 90)  BP: 131/62 (11 Feb 2020 05:59) (130/75 - 169/83)  BP(mean): --  RR: 18 (11 Feb 2020 05:59) (18 - 18)  SpO2: 97% (11 Feb 2020 05:59) (96% - 99%)  CAPILLARY BLOOD GLUCOSE      POCT Blood Glucose.: 199 mg/dL (11 Feb 2020 08:36)    I&O's Summary    10 Feb 2020 07:01  -  11 Feb 2020 07:00  --------------------------------------------------------  IN: 120 mL / OUT: 0 mL / NET: 120 mL        PHYSICAL EXAM:  HEAD:  Atraumatic, Normocephalic  NECK: Supple, No   JVD  CHEST/LUNG:   no     rales,     no,    rhonchi  HEART: Regular rate and rhythm;         murmur  ABDOMEN: Soft, Nontender, ;   EXTREMITIES:        edema  NEUROLOGY:  alert    LABS:                        13.9   6.83  )-----------( 279      ( 11 Feb 2020 05:59 )             43.0     02-11    137  |  103  |  13  ----------------------------<  182<H>  3.9   |  23  |  0.72    Ca    9.5      11 Feb 2020 05:59  Phos  3.4     02-11  Mg     2.2     02-11    TPro  7.2  /  Alb  4.4  /  TBili  0.4  /  DBili  x   /  AST  11  /  ALT  20  /  AlkPhos  105  02-10      CARDIAC MARKERS ( 11 Feb 2020 05:59 )  x     / x     / 66 U/L / x     / 1.5 ng/mL  CARDIAC MARKERS ( 10 Feb 2020 23:27 )  x     / x     / 77 U/L / x     / 1.4 ng/mL            02-10 @ 21:58  3.6  48              Consultant(s) Notes Reviewed:      Care Discussed with Consultants/Other Providers:

## 2020-02-11 NOTE — CHART NOTE - NSCHARTNOTEFT_GEN_A_CORE
Patient underwent a PCI procedure and is being admitted as they are at increased risk for major adverse cardiac and vascular events if discharged due to the following high risk characteristics:      Pre-procedure Clinical Criteria  Major: ACS    s/p Laser/POBA to distal RCA lesion via RRA. Patient arrived to [facility] stable and chest pain free.    Elvie Gama [facility]  Invasive Cardiology  [phone]

## 2020-02-11 NOTE — CONSULT NOTE ADULT - SUBJECTIVE AND OBJECTIVE BOX
In-House Cardiology Consult Note  ---------------------------------------------    Patient seen and evaluated at bedside in ED.    HPI:  63yr old male PMH HTN, HLD, T2DM, CAD, s/p multiple stents (last stent in 5/2019) who c/o continued chest pain/ angina and sob with exertion although starting Ranexa and increased BP dose presented for cardiac cath with possible PCI. Unstable angina with CAD hx and multivessel PCI in past with instent restenosis with successful PCI of RCA most recently in 5/2019 now with borderline Low NL LV FX.    Patient states that he had ~6-7 episodes of sharp/electric-like left sided chest pain which last for a few seconds throughout the course of the day. Pain was not related with exertion. Denies radiation of pain. Endorses associated diaphoresis. Denies shortness of breath or palpitations. Denies symptoms of nausea/emesis, dizziness. Patient states this sensation is new and different from previous chest pain presentations. Patient states he chronically has symptoms of chest pressure with mild radiation to both arm and typically prompted with exertion. Also endorses dyspnea on exertion. Patient also has chronic LE edema for which he started taking Furosemide the past few weeks. Patient states he is not always compliant as he sometimes forgets to take it. He states that LE edema is improved. Denies orthopnea. Patient is compliant with Aspirin and Plavix. Patient called his PMD and was referred to go to the ED for further evaluation.    PMHx:   Diabetes  Gout  Hypertension  Morbid obesity  Dyslipidemia  BPH (benign prostatic hyperplasia)  CAD (coronary artery disease)    PSHx:   History of lumbar laminectomy  History of lumbar discectomy  S/P coronary artery stent placement  History of coronary artery stent placement    Allergies:  Levaquin (Joint Pain)    amLODIPine   Tablet 15 milliGRAM(s) Oral at bedtime  aspirin enteric coated 81 milliGRAM(s) Oral daily  atorvastatin 80 milliGRAM(s) Oral at bedtime  clopidogrel Tablet 75 milliGRAM(s) Oral daily  dextrose 40% Gel 15 Gram(s) Oral once PRN  dextrose 5%. 1000 milliLiter(s) IV Continuous <Continuous>  dextrose 50% Injectable 12.5 Gram(s) IV Push once  dextrose 50% Injectable 25 Gram(s) IV Push once  dextrose 50% Injectable 25 Gram(s) IV Push once  ezetimibe 10 milliGRAM(s) Oral daily  furosemide    Tablet 40 milliGRAM(s) Oral daily  glucagon  Injectable 1 milliGRAM(s) IntraMuscular once PRN  influenza   Vaccine 0.5 milliLiter(s) IntraMuscular once  insulin lispro (HumaLOG) corrective regimen sliding scale   SubCutaneous three times a day before meals  insulin lispro (HumaLOG) corrective regimen sliding scale   SubCutaneous at bedtime  losartan 50 milliGRAM(s) Oral daily  metoprolol tartrate 50 milliGRAM(s) Oral two times a day  ranolazine 500 milliGRAM(s) Oral two times a day    FAMILY HISTORY:  Family history of acute myocardial infarction    REVIEW OF SYSTEMS:  CONSTITUTIONAL: No weakness, fevers or chills  EYES/ENT: No visual changes;  No dysphagia  NECK: No pain or stiffness  RESPIRATORY: No cough, wheezing, hemoptysis; No shortness of breath  CARDIOVASCULAR: No chest pain or palpitations; No lower extremity edema  GASTROINTESTINAL: No abdominal or epigastric pain. No nausea, vomiting, or hematemesis; No diarrhea or constipation. No melena or hematochezia.  GENITOURINARY: No dysuria, frequency or hematuria  NEUROLOGICAL: No numbness or weakness  SKIN: No itching, burning, rashes, or lesions   All other review of systems is negative unless indicated above.    Physical Exam:  T(F): 97.6 (02-11), Max: 98.1 (02-11)  HR: 66 (02-11) (61 - 90)  BP: 139/73 (02-11) (130/75 - 169/83)  RR: 18 (02-11)  SpO2: 96% (02-11)  GENERAL: No acute distress, well-developed  HEAD:  Atraumatic, Normocephalic  ENT: EOMI, PERRLA, conjunctiva and sclera clear, Neck supple, No JVD, moist mucosa  CHEST/LUNG: Clear to auscultation bilaterally; No wheeze, equal breath sounds bilaterally   HEART: Regular rate and rhythm; No murmurs, rubs, or gallops  ABDOMEN: Soft, Nontender, Nondistended; Bowel sounds present  EXTREMITIES:  No clubbing, cyanosis, or edema  PSYCH: Nl behavior, nl affect  NEUROLOGY: AAOx3, non-focal, cranial nerves intact  SKIN: Normal color, No rashes or lesions    CXR: Personally reviewed    Labs: Personally reviewed                        13.0   7.64  )-----------( 276      ( 10 Feb 2020 21:58 )             40.8     02-10    138  |  102  |  15  ----------------------------<  192<H>  3.8   |  23  |  0.78    Ca    9.8      10 Feb 2020 21:58  Mg     2.1     02-10    TPro  7.2  /  Alb  4.4  /  TBili  0.4  /  DBili  x   /  AST  11  /  ALT  20  /  AlkPhos  105  02-10        CARDIAC MARKERS ( 10 Feb 2020 23:27 )  11 ng/L / x     / x     / 77 U/L / x     / 1.4 ng/mL  CARDIAC MARKERS ( 10 Feb 2020 21:58 )  11 ng/L / x     / x     / x     / x     / x            Serum Pro-Brain Natriuretic Peptide: 96 pg/mL (02-10 @ 21:58)

## 2020-02-11 NOTE — PROGRESS NOTE ADULT - ASSESSMENT
63yr old male        h/o   HTN, HLD, T2DM, CAD,  with TVD,  s/p multiple stents ,  last stent in 5/2019, to  RCA        c/o continued chest pain/ angina and sob with exertion          Unstable angina with CAD hx and multivessel PCI in past with in stent restenosis with successful PCI of RCA most recently in 5/2019 now with borderline Low NL LV FX.       admitted  with cp   pe r pt's  cardiologist, his anginal equivalent for pain has changed prior to each of his LHC  pt with    total of 23 stents, according to wife   on     ASA, Plavix;    Norvasc, Lipitor, Lasix/  cozaar    C   today  by gareth GATES, follow   fs        < from: Cardiac Cath Lab - Adult (05.06.19 @ 13:47) >   Diagnostic Conclusions   3 vessel CAD with new lession (LAD) and instent restenosis in RCA with   Borderline Low NL LV FX.     Interventional Conclusions   Successful Coronary Intervention BULL of RCA.   Recommendations   Manage with medical therapy  < end of copied text > 63yr old male        h/o   HTN, HLD, T2DM, CAD,  with TVD,  s/p multiple stents ,  last stent in 5/2019, to  RCA        c/o continued chest pain/ angina and sob with exertion          Unstable angina with CAD hx and multivessel PCI in past with in stent restenosis with successful PCI of RCA most recently in 5/2019 now with borderline Low NL LV FX.       admitted  with cp   pe r pt's  cardiologist, his anginal equivalent for pain has changed prior to each of his [facility]  pt with    total of 23 stents, according to wife   on     ASA, Plavix;    Norvasc, Lipitor, Lasix/  cozaar/  ranexa  norvasc dose  changed  to 10  mg/ from  15  mg     [facility]   today  by dr katy GATES, follow   fs        < from: Cardiac Cath Lab - Adult (05.06.19 @ 13:47) >   Diagnostic Conclusions   3 vessel CAD with new lession (LAD) and instent restenosis in RCA with   Borderline Low NL LV FX.     Interventional Conclusions   Successful Coronary Intervention BULL of RCA.   Recommendations   Manage with medical therapy  < end of copied text >

## 2020-02-11 NOTE — PROGRESS NOTE ADULT - SUBJECTIVE AND OBJECTIVE BOX
HPI:  63yr old male PMH HTN, HLD, T2DM, CAD, s/p multiple stents (last stent in 5/2019) who c/o continued chest pain/ angina and sob with exertion although starting Ranexa and increased BP dose presented for cardiac cath with possible PCI. Unstable angina with CAD hx and multivessel PCI in past with instent restenosis with successful PCI of RCA most recently in 5/2019 now with borderline Low NL LV FX.    Patient states that he had ~6-7 episodes of sharp/electric-like left sided chest pain which last for a few seconds throughout the course of the day. Pain was not related with exertion. Denies radiation of pain. Endorses associated diaphoresis. Denies shortness of breath or palpitations. Denies symptoms of nausea/emesis, dizziness. Patient states this sensation is new and different from previous chest pain presentations. Patient states he chronically has symptoms of chest pressure with mild radiation to both arm and typically prompted with exertion. Also endorses dyspnea on exertion. Patient also has chronic LE edema for which he started taking Furosemide the past few weeks. Patient states he is not always compliant as he sometimes forgets to take it. He states that LE edema is improved. Denies orthopnea. Patient is compliant with Aspirin and Plavix. Patient called his PMD and was referred to go to the ED for further evaluation.    Underwerent cc: showed instent restenosis of RCA.  Had laser athrectomy and PTCA of RCA.  Cp free post procedure.    PMHx:   Diabetes  Gout  Hypertension  Morbid obesity  Dyslipidemia  BPH (benign prostatic hyperplasia)  CAD (coronary artery disease)    PSHx:   History of lumbar laminectomy  History of lumbar discectomy  S/P coronary artery stent placement  History of coronary artery stent placement    Allergies:  Levaquin (Joint Pain)    amLODIPine   Tablet 15 milliGRAM(s) Oral at bedtime  aspirin enteric coated 81 milliGRAM(s) Oral daily  atorvastatin 80 milliGRAM(s) Oral at bedtime  clopidogrel Tablet 75 milliGRAM(s) Oral daily  dextrose 40% Gel 15 Gram(s) Oral once PRN  dextrose 5%. 1000 milliLiter(s) IV Continuous <Continuous>  dextrose 50% Injectable 12.5 Gram(s) IV Push once  dextrose 50% Injectable 25 Gram(s) IV Push once  dextrose 50% Injectable 25 Gram(s) IV Push once  ezetimibe 10 milliGRAM(s) Oral daily  furosemide    Tablet 40 milliGRAM(s) Oral daily  glucagon  Injectable 1 milliGRAM(s) IntraMuscular once PRN  influenza   Vaccine 0.5 milliLiter(s) IntraMuscular once  insulin lispro (HumaLOG) corrective regimen sliding scale   SubCutaneous three times a day before meals  insulin lispro (HumaLOG) corrective regimen sliding scale   SubCutaneous at bedtime  losartan 50 milliGRAM(s) Oral daily  metoprolol tartrate 50 milliGRAM(s) Oral two times a day  ranolazine 500 milliGRAM(s) Oral two times a day    FAMILY HISTORY:  Family history of acute myocardial infarction    Vital Signs Last 24 Hrs  T(C): 36.4 (12 Feb 2020 05:38), Max: 36.7 (11 Feb 2020 20:15)  T(F): 97.6 (12 Feb 2020 05:38), Max: 98 (11 Feb 2020 20:15)  HR: 68 (12 Feb 2020 05:38) (58 - 69)  BP: 130/68 (12 Feb 2020 05:38) (99/54 - 152/69)  BP(mean): --  RR: 18 (12 Feb 2020 05:38) (16 - 18)  SpO2: 95% (12 Feb 2020 05:38) (95% - 99%)    GENERAL: No acute distress, well-developed  HEAD:  Atraumatic, Normocephalic  ENT: EOMI, PERRLA, conjunctiva and sclera clear, Neck supple, No JVD, moist mucosa  CHEST/LUNG: Clear to auscultation bilaterally; No wheeze, equal breath sounds bilaterally   HEART: Regular rate and rhythm; No murmurs, rubs, or gallops  ABDOMEN: Soft, Nontender, Nondistended; Bowel sounds present  EXTREMITIES:  No clubbing, cyanosis, or edema  PSYCH: Nl behavior, nl affect  NEUROLOGY: AAOx3, non-focal, cranial nerves intact  SKIN: Normal color, No rashes or lesions    CXR: Personally reviewed    Labs:                         13.0   6.94  )-----------( 280      ( 12 Feb 2020 01:13 )             40.5     02-12    138  |  101  |  17  ----------------------------<  163<H>  3.7   |  24  |  0.87    Ca    8.9      12 Feb 2020 01:13  Phos  3.4     02-11  Mg     2.2     02-11    TPro  7.2  /  Alb  4.4  /  TBili  0.4  /  DBili  x   /  AST  11  /  ALT  20  /  AlkPhos  105  02-10    CARDIAC MARKERS ( 11 Feb 2020 05:59 )  x     / x     / 66 U/L / x     / 1.5 ng/mL  CARDIAC MARKERS ( 10 Feb 2020 23:27 )  x     / x     / 77 U/L / x     / 1.4 ng/mL      LIVER FUNCTIONS - ( 10 Feb 2020 21:58 )  Alb: 4.4 g/dL / Pro: 7.2 g/dL / ALK PHOS: 105 U/L / ALT: 20 U/L / AST: 11 U/L / GGT: x             02-11 Chol 132 LDL 65 HDL 33<L> Trig 172<H>      < from: Cardiac Cath Lab - Adult (02.11.20 @ 17:50) >  VENTRICLES: Global left ventricular function was normal. EF estimated was  60 %.  VALVES: AORTIC VALVE: There was no aortic stenosis. MITRAL VALVE: The  mitral valve exhibited no regurgitation.  CORONARY VESSELS: The coronary circulation is right dominant.  LM:   --  LM: Angiography showed mild atherosclerosis with no flow limiting  lesions.  LAD:   --  Proximal LAD: Angiography showed mild atherosclerosis with no  flow limiting lesions. There was a 0 % stenosis at the site of a prior  stent.  --  Mid LAD: Angiography showed mild atherosclerosis with no flow limiting  lesions. There was a tubular 0 % stenosis at the site of a prior stent.  CX:   --  Proximal circumflex: Angiography showed mild atherosclerosis with  no flow limiting lesions. There was a tubular 0 % stenosis at the site of  a prior stent.  --  Mid circumflex: There was a tubular 0 % stenosis at the site of a prior  stent.  RCA:   --  Mid RCA: There was a tubular 0 % stenosis at the site of a prior  stent.  --  Distal RCA: There was a diffuse 90 % stenosis at the site of a prior  stent, in-stent.  --  RPL1: There was a tubular 0 % stenosis at the site of a prior stent.  COMPLICATIONS: There were no complications.  DIAGNOSTIC IMPRESSIONS: 3 Vessel CAD with patent stents in LAD and LCX and  instent restenosis in RCA and NL LV FX.  DIAGNOSTIC RECOMMENDATIONS: PCI of RCA  INTERVENTIONAL IMPRESSIONS: Successful PTCA and Laser atherectomy of RCA  INTERVENTIONAL RECOMMENDATIONS: Manage with medical therapy    < end of copied text >

## 2020-02-12 ENCOUNTER — APPOINTMENT (OUTPATIENT)
Dept: CARDIOLOGY | Facility: CLINIC | Age: 65
End: 2020-02-12

## 2020-02-12 ENCOUNTER — TRANSCRIPTION ENCOUNTER (OUTPATIENT)
Age: 65
End: 2020-02-12

## 2020-02-12 VITALS
HEART RATE: 69 BPM | SYSTOLIC BLOOD PRESSURE: 119 MMHG | RESPIRATION RATE: 17 BRPM | DIASTOLIC BLOOD PRESSURE: 59 MMHG | TEMPERATURE: 98 F | OXYGEN SATURATION: 96 %

## 2020-02-12 DIAGNOSIS — I20.0 UNSTABLE ANGINA: ICD-10-CM

## 2020-02-12 LAB
ANION GAP SERPL CALC-SCNC: 13 MMOL/L — SIGNIFICANT CHANGE UP (ref 5–17)
BASOPHILS # BLD AUTO: 0.03 K/UL — SIGNIFICANT CHANGE UP (ref 0–0.2)
BASOPHILS NFR BLD AUTO: 0.4 % — SIGNIFICANT CHANGE UP (ref 0–2)
BUN SERPL-MCNC: 17 MG/DL — SIGNIFICANT CHANGE UP (ref 7–23)
CALCIUM SERPL-MCNC: 8.9 MG/DL — SIGNIFICANT CHANGE UP (ref 8.4–10.5)
CHLORIDE SERPL-SCNC: 101 MMOL/L — SIGNIFICANT CHANGE UP (ref 96–108)
CO2 SERPL-SCNC: 24 MMOL/L — SIGNIFICANT CHANGE UP (ref 22–31)
CREAT SERPL-MCNC: 0.87 MG/DL — SIGNIFICANT CHANGE UP (ref 0.5–1.3)
EOSINOPHIL # BLD AUTO: 0.13 K/UL — SIGNIFICANT CHANGE UP (ref 0–0.5)
EOSINOPHIL NFR BLD AUTO: 1.9 % — SIGNIFICANT CHANGE UP (ref 0–6)
GLUCOSE BLDC GLUCOMTR-MCNC: 191 MG/DL — HIGH (ref 70–99)
GLUCOSE SERPL-MCNC: 163 MG/DL — HIGH (ref 70–99)
HCT VFR BLD CALC: 40.5 % — SIGNIFICANT CHANGE UP (ref 39–50)
HGB BLD-MCNC: 13 G/DL — SIGNIFICANT CHANGE UP (ref 13–17)
IMM GRANULOCYTES NFR BLD AUTO: 0.1 % — SIGNIFICANT CHANGE UP (ref 0–1.5)
LYMPHOCYTES # BLD AUTO: 1.35 K/UL — SIGNIFICANT CHANGE UP (ref 1–3.3)
LYMPHOCYTES # BLD AUTO: 19.5 % — SIGNIFICANT CHANGE UP (ref 13–44)
MCHC RBC-ENTMCNC: 27.4 PG — SIGNIFICANT CHANGE UP (ref 27–34)
MCHC RBC-ENTMCNC: 32.1 GM/DL — SIGNIFICANT CHANGE UP (ref 32–36)
MCV RBC AUTO: 85.3 FL — SIGNIFICANT CHANGE UP (ref 80–100)
MONOCYTES # BLD AUTO: 0.59 K/UL — SIGNIFICANT CHANGE UP (ref 0–0.9)
MONOCYTES NFR BLD AUTO: 8.5 % — SIGNIFICANT CHANGE UP (ref 2–14)
NEUTROPHILS # BLD AUTO: 4.83 K/UL — SIGNIFICANT CHANGE UP (ref 1.8–7.4)
NEUTROPHILS NFR BLD AUTO: 69.6 % — SIGNIFICANT CHANGE UP (ref 43–77)
NRBC # BLD: 0 /100 WBCS — SIGNIFICANT CHANGE UP (ref 0–0)
PLATELET # BLD AUTO: 280 K/UL — SIGNIFICANT CHANGE UP (ref 150–400)
POTASSIUM SERPL-MCNC: 3.7 MMOL/L — SIGNIFICANT CHANGE UP (ref 3.5–5.3)
POTASSIUM SERPL-SCNC: 3.7 MMOL/L — SIGNIFICANT CHANGE UP (ref 3.5–5.3)
RBC # BLD: 4.75 M/UL — SIGNIFICANT CHANGE UP (ref 4.2–5.8)
RBC # FLD: 14.3 % — SIGNIFICANT CHANGE UP (ref 10.3–14.5)
SODIUM SERPL-SCNC: 138 MMOL/L — SIGNIFICANT CHANGE UP (ref 135–145)
WBC # BLD: 6.94 K/UL — SIGNIFICANT CHANGE UP (ref 3.8–10.5)
WBC # FLD AUTO: 6.94 K/UL — SIGNIFICANT CHANGE UP (ref 3.8–10.5)

## 2020-02-12 PROCEDURE — 80048 BASIC METABOLIC PNL TOTAL CA: CPT

## 2020-02-12 PROCEDURE — 84484 ASSAY OF TROPONIN QUANT: CPT

## 2020-02-12 PROCEDURE — 82435 ASSAY OF BLOOD CHLORIDE: CPT

## 2020-02-12 PROCEDURE — 84100 ASSAY OF PHOSPHORUS: CPT

## 2020-02-12 PROCEDURE — 83036 HEMOGLOBIN GLYCOSYLATED A1C: CPT

## 2020-02-12 PROCEDURE — 82550 ASSAY OF CK (CPK): CPT

## 2020-02-12 PROCEDURE — 82553 CREATINE MB FRACTION: CPT

## 2020-02-12 PROCEDURE — 71046 X-RAY EXAM CHEST 2 VIEWS: CPT

## 2020-02-12 PROCEDURE — 82947 ASSAY GLUCOSE BLOOD QUANT: CPT

## 2020-02-12 PROCEDURE — 99285 EMERGENCY DEPT VISIT HI MDM: CPT

## 2020-02-12 PROCEDURE — 84295 ASSAY OF SERUM SODIUM: CPT

## 2020-02-12 PROCEDURE — 83690 ASSAY OF LIPASE: CPT

## 2020-02-12 PROCEDURE — 99152 MOD SED SAME PHYS/QHP 5/>YRS: CPT

## 2020-02-12 PROCEDURE — C8929: CPT

## 2020-02-12 PROCEDURE — 83605 ASSAY OF LACTIC ACID: CPT

## 2020-02-12 PROCEDURE — 85014 HEMATOCRIT: CPT

## 2020-02-12 PROCEDURE — 92924 PRQ TRLUML C ATHRC 1 ART&/BR: CPT | Mod: RC

## 2020-02-12 PROCEDURE — 93005 ELECTROCARDIOGRAM TRACING: CPT

## 2020-02-12 PROCEDURE — 86900 BLOOD TYPING SEROLOGIC ABO: CPT

## 2020-02-12 PROCEDURE — 82803 BLOOD GASES ANY COMBINATION: CPT

## 2020-02-12 PROCEDURE — 85027 COMPLETE CBC AUTOMATED: CPT

## 2020-02-12 PROCEDURE — 86901 BLOOD TYPING SEROLOGIC RH(D): CPT

## 2020-02-12 PROCEDURE — 82962 GLUCOSE BLOOD TEST: CPT

## 2020-02-12 PROCEDURE — 86850 RBC ANTIBODY SCREEN: CPT

## 2020-02-12 PROCEDURE — 84443 ASSAY THYROID STIM HORMONE: CPT

## 2020-02-12 PROCEDURE — 84132 ASSAY OF SERUM POTASSIUM: CPT

## 2020-02-12 PROCEDURE — C1887: CPT

## 2020-02-12 PROCEDURE — C1769: CPT

## 2020-02-12 PROCEDURE — 99232 SBSQ HOSP IP/OBS MODERATE 35: CPT

## 2020-02-12 PROCEDURE — 93458 L HRT ARTERY/VENTRICLE ANGIO: CPT | Mod: 59

## 2020-02-12 PROCEDURE — 82330 ASSAY OF CALCIUM: CPT

## 2020-02-12 PROCEDURE — 83735 ASSAY OF MAGNESIUM: CPT

## 2020-02-12 PROCEDURE — C1894: CPT

## 2020-02-12 PROCEDURE — 83880 ASSAY OF NATRIURETIC PEPTIDE: CPT

## 2020-02-12 PROCEDURE — C1885: CPT

## 2020-02-12 PROCEDURE — C1725: CPT

## 2020-02-12 PROCEDURE — 99153 MOD SED SAME PHYS/QHP EA: CPT

## 2020-02-12 PROCEDURE — 80053 COMPREHEN METABOLIC PANEL: CPT

## 2020-02-12 PROCEDURE — 80061 LIPID PANEL: CPT

## 2020-02-12 RX ADMIN — Medication 40 MILLIGRAM(S): at 06:01

## 2020-02-12 RX ADMIN — CLOPIDOGREL BISULFATE 75 MILLIGRAM(S): 75 TABLET, FILM COATED ORAL at 06:01

## 2020-02-12 RX ADMIN — AMLODIPINE BESYLATE 10 MILLIGRAM(S): 2.5 TABLET ORAL at 06:01

## 2020-02-12 RX ADMIN — Medication 1: at 07:54

## 2020-02-12 RX ADMIN — Medication 81 MILLIGRAM(S): at 06:01

## 2020-02-12 RX ADMIN — LOSARTAN POTASSIUM 50 MILLIGRAM(S): 100 TABLET, FILM COATED ORAL at 06:01

## 2020-02-12 RX ADMIN — Medication 50 MILLIGRAM(S): at 06:01

## 2020-02-12 RX ADMIN — RANOLAZINE 500 MILLIGRAM(S): 500 TABLET, FILM COATED, EXTENDED RELEASE ORAL at 06:01

## 2020-02-12 NOTE — PROGRESS NOTE ADULT - SUBJECTIVE AND OBJECTIVE BOX
no  complaints  REVIEW OF SYSTEMS:  GEN: no fever,    no chills  RESP: no SOB,   no cough  CVS: no chest pain,   no palpitations  GI: no abdominal pain,   no nausea,   no vomiting,   no constipation,   no diarrhea  : no dysuria,   no frequency  NEURO: no headache,   no dizziness  PSYCH: no depression,   not anxious  Derm : no rash    MEDICATIONS  (STANDING):  amLODIPine   Tablet 10 milliGRAM(s) Oral daily  aspirin enteric coated 81 milliGRAM(s) Oral daily  atorvastatin 80 milliGRAM(s) Oral at bedtime  clopidogrel Tablet 75 milliGRAM(s) Oral daily  dextrose 5%. 1000 milliLiter(s) (50 mL/Hr) IV Continuous <Continuous>  dextrose 50% Injectable 12.5 Gram(s) IV Push once  dextrose 50% Injectable 25 Gram(s) IV Push once  dextrose 50% Injectable 25 Gram(s) IV Push once  ezetimibe 10 milliGRAM(s) Oral daily  furosemide    Tablet 40 milliGRAM(s) Oral daily  influenza   Vaccine 0.5 milliLiter(s) IntraMuscular once  insulin lispro (HumaLOG) corrective regimen sliding scale   SubCutaneous three times a day before meals  insulin lispro (HumaLOG) corrective regimen sliding scale   SubCutaneous at bedtime  losartan 50 milliGRAM(s) Oral daily  metoprolol tartrate 50 milliGRAM(s) Oral two times a day  ranolazine 500 milliGRAM(s) Oral two times a day    MEDICATIONS  (PRN):  dextrose 40% Gel 15 Gram(s) Oral once PRN Blood Glucose LESS THAN 70 milliGRAM(s)/deciliter  glucagon  Injectable 1 milliGRAM(s) IntraMuscular once PRN Glucose LESS THAN 70 milligrams/deciliter      Vital Signs Last 24 Hrs  T(C): 36.7 (12 Feb 2020 09:09), Max: 36.7 (11 Feb 2020 20:15)  T(F): 98 (12 Feb 2020 09:09), Max: 98 (11 Feb 2020 20:15)  HR: 69 (12 Feb 2020 09:09) (58 - 69)  BP: 119/59 (12 Feb 2020 09:09) (99/54 - 152/69)  BP(mean): --  RR: 17 (12 Feb 2020 09:09) (17 - 18)  SpO2: 96% (12 Feb 2020 09:09) (95% - 99%)  CAPILLARY BLOOD GLUCOSE      POCT Blood Glucose.: 191 mg/dL (12 Feb 2020 07:40)  POCT Blood Glucose.: 189 mg/dL (11 Feb 2020 21:35)  POCT Blood Glucose.: 111 mg/dL (11 Feb 2020 19:05)  POCT Blood Glucose.: 172 mg/dL (11 Feb 2020 11:22)    I&O's Summary    11 Feb 2020 07:01  -  12 Feb 2020 07:00  --------------------------------------------------------  IN: 600 mL / OUT: 0 mL / NET: 600 mL        PHYSICAL EXAM:  HEAD:  Atraumatic, Normocephalic  NECK: Supple, No   JVD  CHEST/LUNG:   no     rales,     no,    rhonchi  HEART: Regular rate and rhythm;         murmur  ABDOMEN: Soft, Nontender, ;   EXTREMITIES:  no      edema  NEUROLOGY:  alert    LABS:                        13.0   6.94  )-----------( 280      ( 12 Feb 2020 01:13 )             40.5     02-12    138  |  101  |  17  ----------------------------<  163<H>  3.7   |  24  |  0.87    Ca    8.9      12 Feb 2020 01:13  Phos  3.4     02-11  Mg     2.2     02-11    TPro  7.2  /  Alb  4.4  /  TBili  0.4  /  DBili  x   /  AST  11  /  ALT  20  /  AlkPhos  105  02-10      CARDIAC MARKERS ( 11 Feb 2020 05:59 )  x     / x     / 66 U/L / x     / 1.5 ng/mL  CARDIAC MARKERS ( 10 Feb 2020 23:27 )  x     / x     / 77 U/L / x     / 1.4 ng/mL            02-10 @ 21:58  3.6  48    Hemoglobin A1C, Whole Blood: 8.3 % (02-11 @ 10:08)    Thyroid Stimulating Hormone, Serum: 2.35 uIU/mL (02-11 @ 12:18)          Consultant(s) Notes Reviewed:      Care Discussed with Consultants/Other Providers:

## 2020-02-12 NOTE — PROGRESS NOTE ADULT - ASSESSMENT
63yr old male        h/o   HTN, HLD, T2DM, CAD,  with TVD,  s/p multiple stents ,  last stent in 5/2019, to  RCA        c/o continued chest pain/ angina and sob with exertion          Unstable angina with CAD hx and multivessel PCI in past with in stent restenosis with successful PCI of RCA most recently in 5/2019 now with borderline Low NL LV FX.       admitted  with cp   pe r pt's  cardiologist, his anginal equivalent for pain has changed prior to each of his C  pt with    total of 23 stents, according to wife   on     ASA, Plavix;    Norvasc, Lipitor, Lasix/  cozaar/  ranexa      C  ,  DM, follow   fs  plan d/c  today/  f/p  with s r emily        < from: Cardiac Cath Lab - Adult (05.06.19 @ 13:47) >   Diagnostic Conclusions   3 vessel CAD with new lession (LAD) and instent restenosis in RCA with   Borderline Low NL LV FX.     Interventional Conclusions   Successful Coronary Intervention BULL of RCA.   Recommendations   Manage with medical therapy  < end of copied text >

## 2020-02-12 NOTE — DISCHARGE NOTE NURSING/CASE MANAGEMENT/SOCIAL WORK - PATIENT PORTAL LINK FT
You can access the FollowMyHealth Patient Portal offered by [facility] by registering at the following website: [email or web address]. By joining [facility]’s FollowMyHealth portal, you will also be able to view your health information using other applications (apps) compatible with our system.

## 2020-02-28 ENCOUNTER — APPOINTMENT (OUTPATIENT)
Dept: CARDIOLOGY | Facility: CLINIC | Age: 65
End: 2020-02-28
Payer: COMMERCIAL

## 2020-02-28 ENCOUNTER — NON-APPOINTMENT (OUTPATIENT)
Age: 65
End: 2020-02-28

## 2020-02-28 VITALS — OXYGEN SATURATION: 97 % | HEART RATE: 63 BPM | DIASTOLIC BLOOD PRESSURE: 74 MMHG | SYSTOLIC BLOOD PRESSURE: 120 MMHG

## 2020-02-28 VITALS — HEIGHT: 73 IN | BODY MASS INDEX: 39.76 KG/M2 | WEIGHT: 300 LBS

## 2020-02-28 PROCEDURE — 93000 ELECTROCARDIOGRAM COMPLETE: CPT

## 2020-02-28 PROCEDURE — 99214 OFFICE O/P EST MOD 30 MIN: CPT | Mod: 25

## 2020-02-28 NOTE — REASON FOR VISIT
[Follow-Up - From Hospitalization] : follow-up of a recent hospitalization for [Coronary Artery Disease] : coronary artery disease [Hyperlipidemia] : hyperlipidemia [Hypertension] : hypertension

## 2020-02-28 NOTE — DISCUSSION/SUMMARY
[FreeTextEntry1] : Crescendo angina/Chest pain/CAD/Prior PCI:  s/p PCI. Continue dual platelet therapy. Discussed importance to loss weight. \par ADHF: Continue current regime. Use diuretic as much as possible.\par HTN:  controlled.Continue current regimen Low Sodium diet discussed. \par Hyperlipidemia:Continue current medication regimen,. . Low fat diet discussed.\par F/u in 3 months.\par \par \par

## 2020-02-28 NOTE — HISTORY OF PRESENT ILLNESS
[FreeTextEntry1] : Presented to office s/p cardiac cath 2/11/2020. PCI to RCA (restenosis 90%)Laser atherectomy. patient feels fine. denies chest pain, SOB. Cath site right wrist dry and intact. Blood pressure controlled. Patient lost 7 lbs since 4 months. \par \par

## 2020-02-28 NOTE — CARDIOLOGY SUMMARY
[No Ischemia] : no Ischemia [LVEF ___%] : LVEF [unfilled]% [Mild] : mild mitral regurgitation [___] : [unfilled] [___] : [unfilled]

## 2020-02-28 NOTE — PHYSICAL EXAM
[General Appearance - Well Developed] : well developed [General Appearance - Well Nourished] : well nourished [Normal Conjunctiva] : the conjunctiva exhibited no abnormalities [Normal Oral Mucosa] : normal oral mucosa [Normal Oropharynx] : normal oropharynx [Normal Jugular Venous A Waves Present] : normal jugular venous A waves present [Normal Jugular Venous V Waves Present] : normal jugular venous V waves present [Bowel Sounds] : normal bowel sounds [Abdomen Soft] : soft [Abnormal Walk] : normal gait [Nail Clubbing] : no clubbing of the fingernails [Cyanosis, Localized] : no localized cyanosis [] : no rash [Oriented To Time, Place, And Person] : oriented to person, place, and time [No Anxiety] : not feeling anxious [5th Left ICS - MCL] : palpated at the 5th LICS in the midclavicular line [Normal] : normal [Rhythm Regular] : regular [Normal S1] : normal S1 [Normal S2] : normal S2 [I] : a grade 1 [2+] : left 2+ [___ +] : bilateral [unfilled]U+ pretibial pitting edema

## 2020-04-07 ENCOUNTER — RX RENEWAL (OUTPATIENT)
Age: 65
End: 2020-04-07

## 2020-05-07 ENCOUNTER — RX RENEWAL (OUTPATIENT)
Age: 65
End: 2020-05-07

## 2020-05-20 ENCOUNTER — APPOINTMENT (OUTPATIENT)
Dept: CARDIOLOGY | Facility: CLINIC | Age: 65
End: 2020-05-20

## 2020-05-29 RX ORDER — ATORVASTATIN CALCIUM 80 MG/1
80 TABLET, FILM COATED ORAL
Qty: 90 | Refills: 3 | Status: ACTIVE | COMMUNITY
Start: 1900-01-01 | End: 1900-01-01

## 2020-07-29 ENCOUNTER — APPOINTMENT (OUTPATIENT)
Dept: CARDIOLOGY | Facility: CLINIC | Age: 65
End: 2020-07-29
Payer: COMMERCIAL

## 2020-07-29 ENCOUNTER — NON-APPOINTMENT (OUTPATIENT)
Age: 65
End: 2020-07-29

## 2020-07-29 VITALS
SYSTOLIC BLOOD PRESSURE: 162 MMHG | OXYGEN SATURATION: 97 % | HEART RATE: 74 BPM | DIASTOLIC BLOOD PRESSURE: 82 MMHG | WEIGHT: 294 LBS | BODY MASS INDEX: 38.97 KG/M2 | HEIGHT: 73 IN

## 2020-07-29 DIAGNOSIS — E66.9 OBESITY, UNSPECIFIED: ICD-10-CM

## 2020-07-29 PROCEDURE — 99215 OFFICE O/P EST HI 40 MIN: CPT

## 2020-07-29 PROCEDURE — 93000 ELECTROCARDIOGRAM COMPLETE: CPT

## 2020-07-29 RX ORDER — AMLODIPINE BESYLATE 2.5 MG/1
2.5 TABLET ORAL
Qty: 30 | Refills: 8 | Status: DISCONTINUED | COMMUNITY
Start: 2019-10-21 | End: 2020-07-29

## 2020-07-29 RX ORDER — AZITHROMYCIN 250 MG/1
250 TABLET, FILM COATED ORAL
Qty: 6 | Refills: 0 | Status: DISCONTINUED | COMMUNITY
Start: 2019-09-25 | End: 2020-07-29

## 2020-08-01 NOTE — HISTORY OF PRESENT ILLNESS
[FreeTextEntry1] : Has started to get some chest tightness again with exertion over the last few weeks and his BP is running high as well.   Denies Palpitations, orthopnea, PND or Le edema.

## 2020-08-01 NOTE — DISCUSSION/SUMMARY
[FreeTextEntry1] : Crescendo angina/Chest pain/CAD/Prior PCI: return of stable angina (BP not controlled versus ischemia),  Increase Norvasc to 5 mg BID.  If not better will need ischemia evaluation.\par Sleep apnea: on treatment.  \par HTN:/ADHD: BP not controlled and meds adjusted to control better..Low Sodium diet discussed. \par Hyperlipidemia: Labs when he returns. Low fat diet discussed.\par F/u in 2 weeks.\par \par \par

## 2020-08-01 NOTE — CARDIOLOGY SUMMARY
[No Ischemia] : no Ischemia [___] : [unfilled] [LVEF ___%] : LVEF [unfilled]% [Mild] : mild mitral regurgitation [___] : [unfilled]

## 2020-08-03 RX ORDER — ALPRAZOLAM 0.25 MG/1
0.25 TABLET ORAL
Qty: 30 | Refills: 0 | Status: ACTIVE | COMMUNITY
Start: 1900-01-01 | End: 1900-01-01

## 2020-08-12 ENCOUNTER — APPOINTMENT (OUTPATIENT)
Dept: CARDIOLOGY | Facility: CLINIC | Age: 65
End: 2020-08-12
Payer: COMMERCIAL

## 2020-08-12 ENCOUNTER — NON-APPOINTMENT (OUTPATIENT)
Age: 65
End: 2020-08-12

## 2020-08-12 VITALS
OXYGEN SATURATION: 99 % | DIASTOLIC BLOOD PRESSURE: 77 MMHG | SYSTOLIC BLOOD PRESSURE: 130 MMHG | BODY MASS INDEX: 38.43 KG/M2 | HEART RATE: 62 BPM | WEIGHT: 290 LBS | HEIGHT: 73 IN

## 2020-08-12 DIAGNOSIS — I25.10 ATHEROSCLEROTIC HEART DISEASE OF NATIVE CORONARY ARTERY W/OUT ANGINA PECTORIS: ICD-10-CM

## 2020-08-12 DIAGNOSIS — E78.5 HYPERLIPIDEMIA, UNSPECIFIED: ICD-10-CM

## 2020-08-12 DIAGNOSIS — E11.9 TYPE 2 DIABETES MELLITUS W/OUT COMPLICATIONS: ICD-10-CM

## 2020-08-12 DIAGNOSIS — I10 ESSENTIAL (PRIMARY) HYPERTENSION: ICD-10-CM

## 2020-08-12 PROCEDURE — 99215 OFFICE O/P EST HI 40 MIN: CPT

## 2020-08-12 PROCEDURE — 93000 ELECTROCARDIOGRAM COMPLETE: CPT

## 2020-08-12 NOTE — HISTORY OF PRESENT ILLNESS
[FreeTextEntry1] : BP better but angina still happening but at moderate intensity activities. No Sob, no orthopnea, and leg edema is minimal but under control with current diuretic dose. No rest symptoms.

## 2020-08-12 NOTE — PHYSICAL EXAM
[General Appearance - Well Developed] : well developed [General Appearance - Well Nourished] : well nourished [Normal Conjunctiva] : the conjunctiva exhibited no abnormalities [Normal Oropharynx] : normal oropharynx [Normal Oral Mucosa] : normal oral mucosa [Normal Jugular Venous V Waves Present] : normal jugular venous V waves present [Normal Jugular Venous A Waves Present] : normal jugular venous A waves present [FreeTextEntry1] : left base with slight expiratory ronchi. otherwsie CTAP elsewhere. [Bowel Sounds] : normal bowel sounds [Abdomen Soft] : soft [Abnormal Walk] : normal gait [Cyanosis, Localized] : no localized cyanosis [Nail Clubbing] : no clubbing of the fingernails [] : no rash [Oriented To Time, Place, And Person] : oriented to person, place, and time [No Anxiety] : not feeling anxious [5th Left ICS - MCL] : palpated at the 5th LICS in the midclavicular line [Normal] : normal [Rhythm Regular] : regular [Normal S1] : normal S1 [Normal S2] : normal S2 [I] : a grade 1 [2+] : left 2+ [___ +] : bilateral [unfilled]U+ pretibial pitting edema

## 2020-08-12 NOTE — DISCUSSION/SUMMARY
[FreeTextEntry1] : Stable angina/Chest pain/CAD/Prior PCI: Still present despite control of BP. Stress test further evaluation.  Wants to try and exercise for it.\par Sleep apnea: on treatment.  \par HTN:/CDHD: Controlled. Low Sodium diet discussed. \par Hyperlipidemia: Labs when he returns. Low fat diet discussed.\par F/u in 4 weeks.\par \par \par

## 2020-09-10 ENCOUNTER — APPOINTMENT (OUTPATIENT)
Dept: CARDIOLOGY | Facility: CLINIC | Age: 65
End: 2020-09-10
Payer: COMMERCIAL

## 2020-09-10 PROCEDURE — 78452 HT MUSCLE IMAGE SPECT MULT: CPT

## 2020-09-10 PROCEDURE — 93015 CV STRESS TEST SUPVJ I&R: CPT

## 2020-09-10 PROCEDURE — A9500: CPT

## 2020-09-23 ENCOUNTER — APPOINTMENT (OUTPATIENT)
Dept: CARDIOLOGY | Facility: CLINIC | Age: 65
End: 2020-09-23

## 2020-09-27 ENCOUNTER — RX RENEWAL (OUTPATIENT)
Age: 65
End: 2020-09-27

## 2020-10-01 DIAGNOSIS — I20.9 ANGINA PECTORIS, UNSPECIFIED: ICD-10-CM

## 2020-10-01 DIAGNOSIS — I25.10 ATHEROSCLEROTIC HEART DISEASE OF NATIVE CORONARY ARTERY W/OUT ANGINA PECTORIS: ICD-10-CM

## 2020-10-24 DIAGNOSIS — Z01.818 ENCOUNTER FOR OTHER PREPROCEDURAL EXAMINATION: ICD-10-CM

## 2020-10-25 ENCOUNTER — APPOINTMENT (OUTPATIENT)
Dept: DISASTER EMERGENCY | Facility: CLINIC | Age: 65
End: 2020-10-25

## 2020-10-25 LAB — SARS-COV-2 N GENE NPH QL NAA+PROBE: NOT DETECTED

## 2020-10-28 ENCOUNTER — TRANSCRIPTION ENCOUNTER (OUTPATIENT)
Age: 65
End: 2020-10-28

## 2020-10-28 ENCOUNTER — INPATIENT (INPATIENT)
Facility: HOSPITAL | Age: 65
LOS: 0 days | Discharge: HOME | DRG: 251 | End: 2020-10-29
Attending: INTERNAL MEDICINE | Admitting: INTERNAL MEDICINE
Payer: COMMERCIAL

## 2020-10-28 VITALS
DIASTOLIC BLOOD PRESSURE: 67 MMHG | WEIGHT: 289.91 LBS | HEART RATE: 64 BPM | SYSTOLIC BLOOD PRESSURE: 142 MMHG | RESPIRATION RATE: 16 BRPM | TEMPERATURE: 98 F

## 2020-10-28 DIAGNOSIS — I25.10 ATHEROSCLEROTIC HEART DISEASE OF NATIVE CORONARY ARTERY WITHOUT ANGINA PECTORIS: ICD-10-CM

## 2020-10-28 DIAGNOSIS — Z98.890 OTHER SPECIFIED POSTPROCEDURAL STATES: Chronic | ICD-10-CM

## 2020-10-28 DIAGNOSIS — I20.9 ANGINA PECTORIS, UNSPECIFIED: ICD-10-CM

## 2020-10-28 DIAGNOSIS — Z95.5 PRESENCE OF CORONARY ANGIOPLASTY IMPLANT AND GRAFT: Chronic | ICD-10-CM

## 2020-10-28 LAB
ALBUMIN SERPL ELPH-MCNC: 4.4 G/DL — SIGNIFICANT CHANGE UP (ref 3.3–5)
ALP SERPL-CCNC: 113 U/L — SIGNIFICANT CHANGE UP (ref 40–120)
ALT FLD-CCNC: 18 U/L — SIGNIFICANT CHANGE UP (ref 10–45)
ANION GAP SERPL CALC-SCNC: 8 MMOL/L — SIGNIFICANT CHANGE UP (ref 5–17)
AST SERPL-CCNC: 9 U/L — LOW (ref 10–40)
BILIRUB SERPL-MCNC: 0.5 MG/DL — SIGNIFICANT CHANGE UP (ref 0.2–1.2)
BUN SERPL-MCNC: 15 MG/DL — SIGNIFICANT CHANGE UP (ref 7–23)
CALCIUM SERPL-MCNC: 9.1 MG/DL — SIGNIFICANT CHANGE UP (ref 8.4–10.5)
CHLORIDE SERPL-SCNC: 106 MMOL/L — SIGNIFICANT CHANGE UP (ref 96–108)
CO2 SERPL-SCNC: 28 MMOL/L — SIGNIFICANT CHANGE UP (ref 22–31)
CREAT SERPL-MCNC: 0.84 MG/DL — SIGNIFICANT CHANGE UP (ref 0.5–1.3)
GLUCOSE BLDC GLUCOMTR-MCNC: 114 MG/DL — HIGH (ref 70–99)
GLUCOSE BLDC GLUCOMTR-MCNC: 128 MG/DL — HIGH (ref 70–99)
GLUCOSE BLDC GLUCOMTR-MCNC: 162 MG/DL — HIGH (ref 70–99)
GLUCOSE SERPL-MCNC: 141 MG/DL — HIGH (ref 70–99)
HCT VFR BLD CALC: 41.7 % — SIGNIFICANT CHANGE UP (ref 39–50)
HGB BLD-MCNC: 13.4 G/DL — SIGNIFICANT CHANGE UP (ref 13–17)
MCHC RBC-ENTMCNC: 28.2 PG — SIGNIFICANT CHANGE UP (ref 27–34)
MCHC RBC-ENTMCNC: 32.1 GM/DL — SIGNIFICANT CHANGE UP (ref 32–36)
MCV RBC AUTO: 87.8 FL — SIGNIFICANT CHANGE UP (ref 80–100)
NRBC # BLD: 0 /100 WBCS — SIGNIFICANT CHANGE UP (ref 0–0)
PLATELET # BLD AUTO: 264 K/UL — SIGNIFICANT CHANGE UP (ref 150–400)
POTASSIUM SERPL-MCNC: 4.2 MMOL/L — SIGNIFICANT CHANGE UP (ref 3.5–5.3)
POTASSIUM SERPL-SCNC: 4.2 MMOL/L — SIGNIFICANT CHANGE UP (ref 3.5–5.3)
PROT SERPL-MCNC: 6.6 G/DL — SIGNIFICANT CHANGE UP (ref 6–8.3)
RBC # BLD: 4.75 M/UL — SIGNIFICANT CHANGE UP (ref 4.2–5.8)
RBC # FLD: 14.7 % — HIGH (ref 10.3–14.5)
SODIUM SERPL-SCNC: 142 MMOL/L — SIGNIFICANT CHANGE UP (ref 135–145)
WBC # BLD: 8.56 K/UL — SIGNIFICANT CHANGE UP (ref 3.8–10.5)
WBC # FLD AUTO: 8.56 K/UL — SIGNIFICANT CHANGE UP (ref 3.8–10.5)

## 2020-10-28 PROCEDURE — 77770 HDR RDNCL NTRSTL/ICAV BRCHTX: CPT | Mod: 26

## 2020-10-28 PROCEDURE — 99221 1ST HOSP IP/OBS SF/LOW 40: CPT | Mod: 25

## 2020-10-28 PROCEDURE — 93010 ELECTROCARDIOGRAM REPORT: CPT

## 2020-10-28 PROCEDURE — 77470 SPECIAL RADIATION TREATMENT: CPT | Mod: 26

## 2020-10-28 PROCEDURE — 93010 ELECTROCARDIOGRAM REPORT: CPT | Mod: 77

## 2020-10-28 PROCEDURE — 77290 THER RAD SIMULAJ FIELD CPLX: CPT | Mod: 26

## 2020-10-28 PROCEDURE — 77263 THER RADIOLOGY TX PLNG CPLX: CPT

## 2020-10-28 PROCEDURE — 77318 BRACHYTX ISODOSE COMPLEX: CPT | Mod: 26

## 2020-10-28 RX ORDER — RANOLAZINE 500 MG/1
500 TABLET, FILM COATED, EXTENDED RELEASE ORAL
Refills: 0 | Status: DISCONTINUED | OUTPATIENT
Start: 2020-10-28 | End: 2020-10-29

## 2020-10-28 RX ORDER — DEXTROSE 50 % IN WATER 50 %
25 SYRINGE (ML) INTRAVENOUS ONCE
Refills: 0 | Status: DISCONTINUED | OUTPATIENT
Start: 2020-10-28 | End: 2020-10-29

## 2020-10-28 RX ORDER — RANOLAZINE 500 MG/1
1 TABLET, FILM COATED, EXTENDED RELEASE ORAL
Qty: 0 | Refills: 0 | DISCHARGE

## 2020-10-28 RX ORDER — EZETIMIBE 10 MG/1
1 TABLET ORAL
Qty: 0 | Refills: 0 | DISCHARGE

## 2020-10-28 RX ORDER — DEXTROSE 50 % IN WATER 50 %
15 SYRINGE (ML) INTRAVENOUS ONCE
Refills: 0 | Status: DISCONTINUED | OUTPATIENT
Start: 2020-10-28 | End: 2020-10-29

## 2020-10-28 RX ORDER — METFORMIN HYDROCHLORIDE 850 MG/1
1 TABLET ORAL
Qty: 0 | Refills: 0 | DISCHARGE

## 2020-10-28 RX ORDER — GLUCAGON INJECTION, SOLUTION 0.5 MG/.1ML
1 INJECTION, SOLUTION SUBCUTANEOUS ONCE
Refills: 0 | Status: DISCONTINUED | OUTPATIENT
Start: 2020-10-28 | End: 2020-10-29

## 2020-10-28 RX ORDER — ASPIRIN/CALCIUM CARB/MAGNESIUM 324 MG
81 TABLET ORAL DAILY
Refills: 0 | Status: DISCONTINUED | OUTPATIENT
Start: 2020-10-28 | End: 2020-10-29

## 2020-10-28 RX ORDER — ATORVASTATIN CALCIUM 80 MG/1
1 TABLET, FILM COATED ORAL
Qty: 0 | Refills: 0 | DISCHARGE

## 2020-10-28 RX ORDER — METOPROLOL TARTRATE 50 MG
50 TABLET ORAL
Refills: 0 | Status: DISCONTINUED | OUTPATIENT
Start: 2020-10-28 | End: 2020-10-29

## 2020-10-28 RX ORDER — AMLODIPINE BESYLATE 2.5 MG/1
10 TABLET ORAL DAILY
Refills: 0 | Status: DISCONTINUED | OUTPATIENT
Start: 2020-10-28 | End: 2020-10-28

## 2020-10-28 RX ORDER — AMLODIPINE BESYLATE 2.5 MG/1
5 TABLET ORAL
Refills: 0 | Status: DISCONTINUED | OUTPATIENT
Start: 2020-10-28 | End: 2020-10-29

## 2020-10-28 RX ORDER — DEXTROSE 50 % IN WATER 50 %
12.5 SYRINGE (ML) INTRAVENOUS ONCE
Refills: 0 | Status: DISCONTINUED | OUTPATIENT
Start: 2020-10-28 | End: 2020-10-29

## 2020-10-28 RX ORDER — METOPROLOL TARTRATE 50 MG
1 TABLET ORAL
Qty: 0 | Refills: 0 | DISCHARGE

## 2020-10-28 RX ORDER — METOPROLOL TARTRATE 50 MG
2 TABLET ORAL
Qty: 0 | Refills: 0 | DISCHARGE

## 2020-10-28 RX ORDER — CLOPIDOGREL BISULFATE 75 MG/1
1 TABLET, FILM COATED ORAL
Qty: 0 | Refills: 0 | DISCHARGE

## 2020-10-28 RX ORDER — AMLODIPINE BESYLATE 2.5 MG/1
15 TABLET ORAL AT BEDTIME
Refills: 0 | Status: DISCONTINUED | OUTPATIENT
Start: 2020-10-28 | End: 2020-10-28

## 2020-10-28 RX ORDER — INSULIN LISPRO 100/ML
VIAL (ML) SUBCUTANEOUS
Refills: 0 | Status: DISCONTINUED | OUTPATIENT
Start: 2020-10-28 | End: 2020-10-29

## 2020-10-28 RX ORDER — AMLODIPINE BESYLATE 2.5 MG/1
0 TABLET ORAL
Qty: 0 | Refills: 0 | DISCHARGE

## 2020-10-28 RX ORDER — CLOPIDOGREL BISULFATE 75 MG/1
75 TABLET, FILM COATED ORAL DAILY
Refills: 0 | Status: DISCONTINUED | OUTPATIENT
Start: 2020-10-28 | End: 2020-10-29

## 2020-10-28 RX ORDER — LOSARTAN POTASSIUM 100 MG/1
1 TABLET, FILM COATED ORAL
Qty: 0 | Refills: 0 | DISCHARGE

## 2020-10-28 RX ORDER — ATORVASTATIN CALCIUM 80 MG/1
80 TABLET, FILM COATED ORAL AT BEDTIME
Refills: 0 | Status: DISCONTINUED | OUTPATIENT
Start: 2020-10-28 | End: 2020-10-29

## 2020-10-28 RX ORDER — FUROSEMIDE 40 MG
1 TABLET ORAL
Qty: 0 | Refills: 0 | DISCHARGE

## 2020-10-28 RX ORDER — LOSARTAN POTASSIUM 100 MG/1
50 TABLET, FILM COATED ORAL DAILY
Refills: 0 | Status: DISCONTINUED | OUTPATIENT
Start: 2020-10-28 | End: 2020-10-29

## 2020-10-28 RX ORDER — SODIUM CHLORIDE 9 MG/ML
1000 INJECTION, SOLUTION INTRAVENOUS
Refills: 0 | Status: DISCONTINUED | OUTPATIENT
Start: 2020-10-28 | End: 2020-10-29

## 2020-10-28 RX ORDER — ASPIRIN/CALCIUM CARB/MAGNESIUM 324 MG
0 TABLET ORAL
Qty: 0 | Refills: 0 | DISCHARGE

## 2020-10-28 RX ADMIN — ATORVASTATIN CALCIUM 80 MILLIGRAM(S): 80 TABLET, FILM COATED ORAL at 21:31

## 2020-10-28 RX ADMIN — Medication 50 MILLIGRAM(S): at 17:21

## 2020-10-28 RX ADMIN — AMLODIPINE BESYLATE 5 MILLIGRAM(S): 2.5 TABLET ORAL at 17:21

## 2020-10-28 RX ADMIN — RANOLAZINE 500 MILLIGRAM(S): 500 TABLET, FILM COATED, EXTENDED RELEASE ORAL at 17:21

## 2020-10-28 NOTE — H&P CARDIOLOGY - PMH
BPH (benign prostatic hyperplasia)    CAD (coronary artery disease)    Diabetes    Dyslipidemia    Gout    Hypertension    Morbid obesity    HAIM (obstructive sleep apnea)

## 2020-10-28 NOTE — H&P CARDIOLOGY - HEIGHT IN FEET
"SUBJECTIVE:   Chief Complaint   Patient presents with   • Diabetes     3 mth follow       48 y.o. female for follow up of diabetes.   BG in -160  Breakfast lunch and dinner mostly from restaurants. She is traveling every other week. Hard to get in a routine, does not like the travel trying to get out of it or lessen it.   She does a lot of walking everywhere with this change in her job.   Dr. Araya with eye exam, thinks 2/2019    Current Outpatient Medications   Medication Sig Dispense Refill   • BD ULTRA-FINE PEN NEEDLES 29G X 12.7MM misc Use to inject 1.2mg daily under the skin daily 100 each 1   • FARXIGA 10 MG tablet TAKE 1 TABLET DAILY 90 tablet 0   • Insulin Pen Needle 29G X 12MM misc Inject 0.6 mg daily under the skin for one week and then inject 1.2 mg a day under the skin 100 each 3   • Liraglutide (VICTOZA) 18 MG/3ML solution pen-injector injection inject 1.2 mg daily under skin 15 pen 3   • lisinopril (PRINIVIL,ZESTRIL) 5 MG tablet TAKE 1 TABLET DAILY 90 tablet 1   • metFORMIN (GLUCOPHAGE) 500 MG tablet TAKE 2 TABLETS TWICE A  tablet 0   • Omega-3 Fatty Acids (FISH OIL) 1000 MG capsule capsule Take 4 capsules by mouth daily.     • pravastatin (PRAVACHOL) 40 MG tablet TAKE 1 TABLET DAILY 90 tablet 1   • diclofenac (VOLTAREN) 75 MG EC tablet      • solifenacin (VESICARE) 10 MG tablet        No current facility-administered medications for this visit.        OBJECTIVE:    Review of Systems   Respiratory: Negative.    Endocrine: Negative.    Neurological: Negative.        Vitals:    08/15/19 0817   BP: 126/72   BP Location: Left arm   Patient Position: Sitting   Cuff Size: Adult   Pulse: 96   Resp: 16   Temp: 98.8 °F (37.1 °C)   TempSrc: Oral   SpO2: 97%   Weight: 128 kg (282 lb 6.4 oz)   Height: 177.8 cm (70\")       Objective   Physical Exam   Constitutional: She is oriented to person, place, and time. She appears well-nourished. No distress.   Eyes: Conjunctivae are normal. Right eye exhibits " no discharge. Left eye exhibits no discharge. No scleral icterus.   Cardiovascular: Normal rate, regular rhythm, normal heart sounds and intact distal pulses. Exam reveals no gallop and no friction rub.   No murmur heard.  Pulmonary/Chest: Effort normal and breath sounds normal. No respiratory distress. She has no wheezes.   Musculoskeletal: She exhibits no edema.   Neurological: She is alert and oriented to person, place, and time.   Psychiatric: She has a normal mood and affect. Her behavior is normal.   Vitals reviewed.      Assessment/Plan   Yu was seen today for diabetes.    Diagnoses and all orders for this visit:    Type 2 diabetes mellitus with hyperglycemia, without long-term current use of insulin (CMS/[facility])  -     Hemoglobin A1c  -     Basic Metabolic Panel    Essential hypertension    Colon cancer screening  -     Cologuard - Stool, Per Rectum; Future    She is about the same. Has not made any major changes to diet or exercise related to her job change and lots of travel.   Her weight is stable.   Blood pressure is well controlled  She is still smoking  Just turned 48. She does not have first degree relative with colon cancer and would like to do screening with cologuard.     Follow up in 3 months.           6

## 2020-10-28 NOTE — DISCHARGE NOTE PROVIDER - NSDCCPCAREPLAN_GEN_ALL_CORE_FT
PRINCIPAL DISCHARGE DIAGNOSIS  Diagnosis: CAD (coronary artery disease)  Assessment and Plan of Treatment: Wound Care:   the day AFTER your procedure remove bandage GENTTLY, and clean using  mild soap and gentle warm, water stream, pat dry. leave OPEN to air. YOU MAY SHOWER   DO NOT apply lotions, creams, ointments, powder, parfumes to your incision site  DO NOT SOAK your site for 1 week ( no baths, no pools, no tubs, etc...)  Check  your groin and /or wirst daily.A small amount of bruising, and soarness are normal  ACTIVITY: for 24 hours   - DO NOT DRIVE  - DO NOT make any important decisions or sign legal documents   - DO NOT operate heavy machinaries   - you may resume sexual activity in 48 hours, unless otherwise instructed by your cardiologist   If your procedure was done through the WRIST: for the NEXT 3DAYS:  - avoid pushing, pulling, with that affected wrist   - avoid repeated movement of that hand and wrist ( eg: typing, hammering)  - DO NOT LIFT anything more than 5 lbs   MEDICATION:   take your medications as explained ( see discharge paperwork)   If you received a STENT, you will be taking antiplatelet medications to KEEP YOUR STENT OPEN ( eg: Aspirin, Plavix, Brilinta, Effient, etc).  Take as prescribed DO NOT STOP taking them without consulting with your cardiologist first.   Follow heart healthy diet reccomended by your doctor, , if you smoke STOP SMOKING ( may call [phone] for center of tobacco control if you need assistance)   CALL your doctor to make appointment in 2 WEEKS   ***CALL YOUR DOCTOR***  if you experience: fever, chills, body aches, or severe pain, swelling, redness, heat or yellow discharge at incision site  If you experience Bleeding or excruciating pain at the procedural site, sweliing ( golf ball size) at your procedural site  If you experience CHEST PAIN  If you experience extremity numbness, tingling, temperature change ( of your procedural site)   If you are unable to reach your doctor, you may contact:   -Cardiology Office at [facility] at [phone] or   - [facility] [phone]  - [facility] [phone]

## 2020-10-28 NOTE — DISCHARGE NOTE PROVIDER - HOSPITAL COURSE
64 yo man with PMH of obesity, former smoker, HTN, HLD, DMT2, CAD with reported 19 stents, pulmonary nodules (being monitored with outpt ct), HAIM who presented to cardiology, Dr. Guerra, for evaluation of exertional chest pan with moderate intensity activities.  Denies SOB/CAGE, dizziness, diaphoresis, palpitations, nausea, vomiting, peripheral edema, recent weight gain, or syncope.  No implanted monitoring devices. NST 9/10/2020 EF 51% There are large, moderate to severe defects in inferior and inferoapical walls that are predominantly fixed, suggestive of infarction with mild edvin infarct ischemia.  Pt presents today for brachy therapy of  RCA via right radial artery. Pt currently stable for discharge.

## 2020-10-28 NOTE — DISCHARGE NOTE PROVIDER - CARE PROVIDER_API CALL
Jay Guerra  CARDIOVASCULAR DISEASE  [address] [place] [address]  Phone: [phone]  Fax: [phone]  Established Patient  Follow Up Time: 2 weeks

## 2020-10-28 NOTE — DISCHARGE NOTE PROVIDER - NSDCCPTREATMENT_GEN_ALL_CORE_FT
PRINCIPAL PROCEDURE  Procedure: PTCA, with intravascular brachytherapy  Findings and Treatment: CORONARY VESSELS: The coronary circulation is right dominant.  LM:   --  LM: Angiography showed mild atherosclerosis with no flow limiting  lesions.  LAD:   --  LAD: Angiography showed mild atherosclerosis with no flow  limiting lesions.  --  Proximal LAD: There was a tubular 0 % stenosis at the site of a prior  stent.  CX:   --  Proximal circumflex: There was a tubular 0 % stenosis at the site  of a prior stent.  RCA:   --  Mid RCA: There was a diffuse 90 % stenosis at the site of a  prior stent.  COMPLICATIONS: There were no complications.  DIAGNOSTIC IMPRESSIONS: 3 vessel CAD with instent restenosis in RCA. Patent  stents in LAD and LCX.  DIAGNOSTIC RECOMMENDATIONS: PTCA and Brachytherapy of RCA  INTERVENTIONAL IMPRESSIONS: Succesful PTCA andBrachytherapy of RCA  INTERVENTIONAL RECOMMENDATIONS: Mange with medical therapy  DISPOSITION: The patient left the catheterization laboratory in stable  condition.

## 2020-10-28 NOTE — H&P CARDIOLOGY - HISTORY OF PRESENT ILLNESS
66 yo man with PMH of obesity, former smoker, HTN, HLD, DMT2, CAD with reported 19 stents, pulmonary nodules (being monitored with outpt ct), HAIM who presented to cardiology, Dr. Guerra, for evaluation of exertional chest pan with moderate intensity activities.  Denies SOB/CAGE, dizziness, diaphoresis, palpitations, nausea, vomiting, peripheral edema, recent weight gain, or syncope.  No implanted monitoring devices. NST 9/10/2020 EF 51% There are large, moderate to severe defects in inferior and inferoapical walls that are predominantly fixed, suggestive of infarction with mild edvin infarct ischemia.

## 2020-10-28 NOTE — DISCHARGE NOTE PROVIDER - NSDCMRMEDTOKEN_GEN_ALL_CORE_FT
amLODIPine 5 mg oral tablet: 2 tabs AM  3 tabs PM  aspirin 81 mg oral tablet: 1 tab(s) orally once a day  atorvastatin 80 mg oral tablet: 1 tab(s) orally once a day  clopidogrel 75 mg oral tablet: 1 tab(s) orally once a day  ezetimibe 10 mg oral tablet: 1 tab(s) orally once a day  hydroCHLOROthiazide 12.5 mg oral tablet: 1 tab(s) orally once a day  losartan 50 mg oral tablet: 1 tab(s) orally once a day  metFORMIN 500 mg oral tablet: 1 tab(s) orally 2 times a day DO NOT TAKE THURS 10/29 or FRIDAY 10/30. MAY RESUME SAT 10/31  Metoprolol Tartrate 50 mg oral tablet: 1 tab(s) orally 2 times a day  Ranexa 500 mg oral tablet, extended release: 1 tab(s) orally 2 times a day

## 2020-10-29 ENCOUNTER — TRANSCRIPTION ENCOUNTER (OUTPATIENT)
Age: 65
End: 2020-10-29

## 2020-10-29 VITALS
DIASTOLIC BLOOD PRESSURE: 70 MMHG | RESPIRATION RATE: 18 BRPM | SYSTOLIC BLOOD PRESSURE: 136 MMHG | HEART RATE: 72 BPM | TEMPERATURE: 99 F

## 2020-10-29 LAB — GLUCOSE BLDC GLUCOMTR-MCNC: 140 MG/DL — HIGH (ref 70–99)

## 2020-10-29 PROCEDURE — C1769: CPT

## 2020-10-29 PROCEDURE — 99232 SBSQ HOSP IP/OBS MODERATE 35: CPT

## 2020-10-29 PROCEDURE — 93005 ELECTROCARDIOGRAM TRACING: CPT

## 2020-10-29 PROCEDURE — 77290 THER RAD SIMULAJ FIELD CPLX: CPT

## 2020-10-29 PROCEDURE — C1725: CPT

## 2020-10-29 PROCEDURE — 77318 BRACHYTX ISODOSE COMPLEX: CPT

## 2020-10-29 PROCEDURE — 82962 GLUCOSE BLOOD TEST: CPT

## 2020-10-29 PROCEDURE — C1887: CPT

## 2020-10-29 PROCEDURE — 77470 SPECIAL RADIATION TREATMENT: CPT

## 2020-10-29 PROCEDURE — C1717: CPT

## 2020-10-29 PROCEDURE — 99153 MOD SED SAME PHYS/QHP EA: CPT

## 2020-10-29 PROCEDURE — C1728: CPT

## 2020-10-29 PROCEDURE — 99152 MOD SED SAME PHYS/QHP 5/>YRS: CPT

## 2020-10-29 PROCEDURE — 80053 COMPREHEN METABOLIC PANEL: CPT

## 2020-10-29 PROCEDURE — 92974 CATH PLACE CARDIO BRACHYTX: CPT

## 2020-10-29 PROCEDURE — C1894: CPT

## 2020-10-29 PROCEDURE — 77770 HDR RDNCL NTRSTL/ICAV BRCHTX: CPT

## 2020-10-29 PROCEDURE — 85027 COMPLETE CBC AUTOMATED: CPT

## 2020-10-29 PROCEDURE — 92920 PRQ TRLUML C ANGIOP 1ART&/BR: CPT | Mod: RC

## 2020-10-29 RX ADMIN — RANOLAZINE 500 MILLIGRAM(S): 500 TABLET, FILM COATED, EXTENDED RELEASE ORAL at 05:14

## 2020-10-29 RX ADMIN — LOSARTAN POTASSIUM 50 MILLIGRAM(S): 100 TABLET, FILM COATED ORAL at 05:14

## 2020-10-29 RX ADMIN — Medication 50 MILLIGRAM(S): at 05:14

## 2020-10-29 RX ADMIN — AMLODIPINE BESYLATE 5 MILLIGRAM(S): 2.5 TABLET ORAL at 05:15

## 2020-10-29 RX ADMIN — Medication 81 MILLIGRAM(S): at 05:15

## 2020-10-29 RX ADMIN — CLOPIDOGREL BISULFATE 75 MILLIGRAM(S): 75 TABLET, FILM COATED ORAL at 05:15

## 2020-10-29 NOTE — PROGRESS NOTE ADULT - SUBJECTIVE AND OBJECTIVE BOX
Pt denies chest pain, shortness of breath, right radial artery access site     Allergies    Levaquin (Joint Pain)    Intolerances        Medications:  amLODIPine   Tablet 5 milliGRAM(s) Oral <User Schedule>  aspirin enteric coated 81 milliGRAM(s) Oral daily  atorvastatin 80 milliGRAM(s) Oral at bedtime  clopidogrel Tablet 75 milliGRAM(s) Oral daily  dextrose 40% Gel 15 Gram(s) Oral once PRN  dextrose 5%. 1000 milliLiter(s) IV Continuous <Continuous>  dextrose 50% Injectable 12.5 Gram(s) IV Push once  dextrose 50% Injectable 25 Gram(s) IV Push once  dextrose 50% Injectable 25 Gram(s) IV Push once  glucagon  Injectable 1 milliGRAM(s) IntraMuscular once PRN  hydrochlorothiazide Oral Tab/Cap - Peds 12.5 milliGRAM(s) Oral daily  insulin lispro (ADMELOG) corrective regimen sliding scale   SubCutaneous three times a day before meals  losartan 50 milliGRAM(s) Oral daily  metoprolol tartrate 50 milliGRAM(s) Oral two times a day  ranolazine 500 milliGRAM(s) Oral two times a day      Vitals:  T(C): 36.4 (10-28-20 @ 11:50), Max: 36.5 (10-28-20 @ 07:49)  HR: 65 (10-28-20 @ 21:15) (61 - 78)  BP: 130/62 (10-28-20 @ 21:15) (124/64 - 163/80)  BP(mean): 92 (10-28-20 @ 07:58) (92 - 92)  RR: 18 (10-28-20 @ 21:15) (16 - 18)  SpO2: 95% (10-28-20 @ 21:15) (95% - 99%)  Wt(kg): --  Daily Height in cm: 185.42 (28 Oct 2020 07:58)    Daily Weight in k.5 (28 Oct 2020 07:58)  I&O's Summary    28 Oct 2020 07:01  -  29 Oct 2020 02:37  --------------------------------------------------------  IN: 0 mL / OUT: 550 mL / NET: -550 mL          Physical Exam:  Appearance: Normal  Eyes: PERRL, EOMI  HENT: Normal oral muscosa, NC/AT  Cardiovascular: S1S2, RRR, No M/R/G, no JVD, No Lower extremity edema  Procedural Access Site:RIGHT RADIAL ARTERY No hematoma, Non-tender to palpation, 2+ pulse, No Ecchymosis  Respiratory: Clear to auscultation bilaterally  Gastrointestinal: Soft, Non tender, Normal Bowel Sounds  Musculoskeletal: No clubbing, No joint deformity   Neurologic: Non-focal  Lymphatic: No lymphadenopathy  Psychiatry: AAOx3, Mood & affect appropriate  Skin: No rashes, No ecchymoses, No cyanosis    10-28    142  |  106  |  15  ----------------------------<  141<H>  4.2   |  28  |  0.84    Ca    9.1      28 Oct 2020 08:28    TPro  6.6  /  Alb  4.4  /  TBili  0.5  /  DBili  x   /  AST  9<L>  /  ALT  18  /  AlkPhos  113  10-28      ECG: 10/28/20 @ 1149 SR with 1st degree AVB, 62 bpm, no acute ST changes    Cath:  CORONARY VESSELS: The coronary circulation is right dominant.  LM:   --  LM: Angiography showed mild atherosclerosis with no flow limiting  lesions.  LAD:   --  LAD: Angiography showed mild atherosclerosis with no flow  limiting lesions.  --  Proximal LAD: There was a tubular 0 % stenosis at the site of a prior  stent.  CX:   --  Proximal circumflex: There was a tubular 0 % stenosis at the site  of a prior stent.  RCA:   --  Mid RCA: There was a diffuse 90 % stenosis at the site of a  prior stent.  COMPLICATIONS: There were no complications.  DIAGNOSTIC IMPRESSIONS: 3 vessel CAD with instent restenosis in RCA. Patent  stents in LAD and LCX.  DIAGNOSTIC RECOMMENDATIONS: PTCA and Brachytherapy of RCA  INTERVENTIONAL IMPRESSIONS: Succesful PTCA andBrachytherapy of RCA  INTERVENTIONAL RECOMMENDATIONS: Mange with medical therapy    Interpretation of Telemetry: no events    A/P    64 yo man with PMH of obesity, former smoker, HTN, HLD, DMT2, CAD with reported 19 stents, pulmonary nodules (being monitored with outpt ct), HAIM who presented to cardiology, Dr. Guerra, for evaluation of exertional chest pan with moderate intensity activities.  Denies SOB/CAGE, dizziness, diaphoresis, palpitations, nausea, vomiting, peripheral edema, recent weight gain, or syncope.  No implanted monitoring devices. Pt is now s/p PTCA and brachy therapy of ISR of RCA. Pt hemodynamically stable and chest pain free.    # CAD     cont DAPT with ASA/PLAVIX     cont atorvastatin     cont ranexa     cont metroprolol tartrate    # HTN     cont metoprolol tartrate     cont losartan/hctz     cont amlodipine     consistent carbohydrate DASH diet    # HLD    cont atorvastatin    resume Zetia at home    consistent carb DASH diet    # T2DM    FS AC/HS    ISS prn tid    hold metformin Thurs 10/29 and Fri 10/30, resume Sat 10/31    # DISPO     anticipate discharge later this am      outpatient follow up with Dr Guerra in 2 weeks    Juan A Zurita, NP   x [phone]

## 2020-10-29 NOTE — DISCHARGE NOTE NURSING/CASE MANAGEMENT/SOCIAL WORK - PATIENT PORTAL LINK FT
You can access the FollowMyHealth Patient Portal offered by [facility] by registering at the following website: [email or web address]. By joining [facility]’s FollowMyHealth portal, you will also be able to view your health information using other applications (apps) compatible with our system.

## 2020-12-16 ENCOUNTER — RX RENEWAL (OUTPATIENT)
Age: 65
End: 2020-12-16

## 2020-12-16 RX ORDER — HYDROCHLOROTHIAZIDE 12.5 MG/1
12.5 TABLET ORAL
Qty: 30 | Refills: 5 | Status: ACTIVE | COMMUNITY
Start: 2019-06-04 | End: 1900-01-01

## 2020-12-16 RX ORDER — FUROSEMIDE 40 MG/1
40 TABLET ORAL DAILY
Qty: 30 | Refills: 5 | Status: ACTIVE | COMMUNITY
Start: 2019-07-24 | End: 1900-01-01

## 2020-12-16 RX ORDER — METOPROLOL TARTRATE 50 MG/1
50 TABLET, FILM COATED ORAL
Qty: 180 | Refills: 2 | Status: ACTIVE | COMMUNITY
Start: 2020-04-07 | End: 1900-01-01

## 2021-01-14 RX ORDER — LOSARTAN POTASSIUM 50 MG/1
50 TABLET, FILM COATED ORAL
Qty: 90 | Refills: 3 | Status: ACTIVE | COMMUNITY
Start: 2019-06-04 | End: 1900-01-01

## 2021-03-09 ENCOUNTER — TRANSCRIBE ORDER (OUTPATIENT)
Dept: SCHEDULING | Age: 66
End: 2021-03-09

## 2021-03-09 DIAGNOSIS — S32.010A COMPRESSION FRACTURE OF L1 LUMBAR VERTEBRA (HCC): Primary | ICD-10-CM

## 2021-04-22 RX ORDER — LOSARTAN POTASSIUM 50 MG/1
50 TABLET ORAL DAILY
COMMUNITY

## 2021-04-22 RX ORDER — CLOPIDOGREL BISULFATE 75 MG/1
75 TABLET ORAL DAILY
COMMUNITY

## 2021-04-22 RX ORDER — AMLODIPINE BESYLATE 5 MG/1
5 TABLET ORAL 2 TIMES DAILY
COMMUNITY

## 2021-04-22 RX ORDER — ATORVASTATIN CALCIUM 80 MG/1
80 TABLET, FILM COATED ORAL DAILY
COMMUNITY

## 2021-04-22 RX ORDER — METFORMIN HYDROCHLORIDE 500 MG/1
500 TABLET ORAL 2 TIMES DAILY WITH MEALS
COMMUNITY

## 2021-04-22 RX ORDER — GUAIFENESIN 100 MG/5ML
81 LIQUID (ML) ORAL DAILY
COMMUNITY
End: 2022-10-13

## 2021-04-22 RX ORDER — EZETIMIBE 10 MG/1
10 TABLET ORAL DAILY
COMMUNITY

## 2021-04-22 NOTE — PROGRESS NOTES
Called pt to discuss MRI lumbar spine with sedation. There isn't any information in his chart because he moved here from [place] in November 2020. While going over his surgeries he states he has 29 cardiac stents. Last procedure was done in Oct 2020. His cardiologist in [place] is Dr. Kary Roca at [facility]. Pt's only other surgery was a lumbar discectomy and laminectomy at 42 yrs old. Pt having MRI d/t he fell 3 feet backwards off a ladder in Feb 2021. He didn't seek medical attention until 3 weeks later when the pain didn't improve. Went to [facility] who did an x-ray saying he has a fracture of L4-L5 and sent him to [phone]. Pt states he doesn't have a PCP yet he's still interviewing. But, he has seen a cardiologist but his wife will have to call back with that information. Told pt with that many stents he most likely will not be able to have an MRI.

## 2021-04-26 NOTE — PROGRESS NOTES
Pt scheduled for MRI Lumbar spine with sedation 4/28/21. Pt has 29 cardiac stents and there is  concern for heating during MRI so MRI is not recommended for this pt. Spoke with Dr. Dayanara Kim who recommended CT imaging instead. Msg left for Dr. Lizarraga [facility] office.

## 2021-04-28 ENCOUNTER — HOSPITAL ENCOUNTER (OUTPATIENT)
Dept: MRI IMAGING | Age: 66
Discharge: HOME | End: 2021-04-28
Attending: PHYSICAL MEDICINE & REHABILITATION

## 2021-06-26 ENCOUNTER — TRANSCRIBE ORDER (OUTPATIENT)
Dept: SCHEDULING | Age: 66
End: 2021-06-26

## 2021-06-26 DIAGNOSIS — S32.010A COMPRESSION FRACTURE OF L1 LUMBAR VERTEBRA (HCC): Primary | ICD-10-CM

## 2021-09-14 ENCOUNTER — RX RENEWAL (OUTPATIENT)
Age: 66
End: 2021-09-14

## 2021-12-15 ENCOUNTER — RX RENEWAL (OUTPATIENT)
Age: 66
End: 2021-12-15

## 2022-06-16 NOTE — PATIENT PROFILE ADULT - BRADEN NUTRITION
[Normal Growth] : growth [Normal Development] : development  [No Elimination Concerns] : elimination [Continue Regimen] : feeding [No Skin Concerns] : skin [Normal Sleep Pattern] : sleep [None] : no medical problems [Anticipatory Guidance Given] : Anticipatory guidance addressed as per the history of present illness section [Physical Growth and Development] : physical growth and development [Social and Academic Competence] : social and academic competence [Emotional Well-Being] : emotional well-being [Risk Reduction] : risk reduction [Violence and Injury Prevention] : violence and injury prevention [No Vaccines] : no vaccines needed [No Medications] : ~He/She~ is not on any medications [Patient] : patient [Parent/Guardian] : Parent/Guardian [FreeTextEntry1] : 13 YO here for WCC and Travel vaccines\par Typhoid administered\par Mefloquine sent to pharmacy\par Referral given for neurology for HA\par Educated on being mindful when sitting in class or at home with back straight, Referral to ortho for consistent back pain, may consider PT if improving posture does not alleviate symptoms of back pain\par \par HEADACHE: \par - Keep a headache diary. Monitor the frequency, duration and severity of your headaches over time. Identify patterns that may help determine triggers and improve treatment. Track medication use and response. Maintain long term records of what has worked and what has not. \par - Sleep Hygiene: Strong sleep hygiene means having both a bedroom environment and daily routines that promote consistent, uninterrupted sleep. Keeping a stable sleep schedule, making your bedroom comfortable and free of disruptions, following a relaxing pre-bed routine, and building healthy habits during the day can all contribute to ideal sleep hygiene.\par - Ensure adequate water consumption.\par - Try to minimize use of electronics. \par - Referral given for Neurology if headache persists despite interventions discussed.\par - Mother counseled to seek medical attention immediately if there is worsening headache, witnessed loss of consciousness, definite amnesia, witnessed disorientation, persistent vomiting or persistent irritability.\par - RTC for WCC or sooner as needed.\par \par BACK PAIN\par Pain medications — Nonprescription pain medications, such as acetaminophen (sample brand name: Tylenol) or ibuprofen (sample brand names: Advil, Motrin) may help to reduce a child's back pain. These medications are particularly helpful for back pain caused by overuse, strains, and sprains.\par \par These medications should be given according to the child's weight, rather than age.\par \par Heat — Applying heat can help with back pain during the first few days after overuse or an injury. You may use a heating pad, hot water bottle, or other hot pack. Be careful to avoid burning the skin with a pack or pad that is too hot.\par \par Remaining active — Staying active can help to relieve muscle spasms and prevents weakening of the muscles. On the other hand, back pain caused by overexertion may not improve without rest. Thus, high-impact activities (running, jumping, or other activities that cause pain) should be avoided while the child has pain. It is fine for the child to continue with regular day-to-day activities and light exercise that does not cause pain.\par \par Activities such as walking, swimming, bicycling, and other low-impact activities are also recommended. The child should temporarily avoid activities that involve twisting or bending, are high impact, or that make the back hurt more.\par  (4) excellent

## 2022-08-03 NOTE — HISTORY OF PRESENT ILLNESS
[FreeTextEntry1] : Patient still c/o of CP/angina and exertional SOB even after starting Ranexa and increased BB dose. BP still running high. Symptoms also occurring with slightly lower level of exertion than previous.  Denies palpitations, orthopnea, paroxysmal nocturnal dyspnea, claudication, dizziness,  lightheadedness, presyncopal, or syncopal symptoms.
Simple: Patient demonstrates quick and easy understanding/Verbalized Understanding

## 2022-08-10 NOTE — ED ADULT NURSE NOTE - CADM POA URETHRAL CATHETER
Refused to take am meds at this time, fearing that she will vomit meds, wants to take those at 8 am, to endorse to am RN.   No

## 2022-10-13 ENCOUNTER — APPOINTMENT (OUTPATIENT)
Dept: GENERAL RADIOLOGY | Age: 67
End: 2022-10-13
Attending: STUDENT IN AN ORGANIZED HEALTH CARE EDUCATION/TRAINING PROGRAM
Payer: COMMERCIAL

## 2022-10-13 ENCOUNTER — HOSPITAL ENCOUNTER (EMERGENCY)
Age: 67
Discharge: HOME | End: 2022-10-13
Attending: STUDENT IN AN ORGANIZED HEALTH CARE EDUCATION/TRAINING PROGRAM
Payer: COMMERCIAL

## 2022-10-13 VITALS
RESPIRATION RATE: 18 BRPM | SYSTOLIC BLOOD PRESSURE: 135 MMHG | HEART RATE: 72 BPM | HEIGHT: 73 IN | BODY MASS INDEX: 36.99 KG/M2 | WEIGHT: 279.1 LBS | TEMPERATURE: 98 F | DIASTOLIC BLOOD PRESSURE: 73 MMHG | OXYGEN SATURATION: 93 %

## 2022-10-13 DIAGNOSIS — I48.92 ATRIAL FLUTTER WITH RAPID VENTRICULAR RESPONSE (HCC): ICD-10-CM

## 2022-10-13 DIAGNOSIS — R07.9 CHEST PAIN, UNSPECIFIED TYPE: Primary | ICD-10-CM

## 2022-10-13 LAB
ALBUMIN SERPL-MCNC: 4 G/DL (ref 3.5–5)
ALBUMIN/GLOB SERPL: 1 {RATIO} (ref 1.1–2.2)
ALP SERPL-CCNC: 129 U/L (ref 45–117)
ALT SERPL-CCNC: 25 U/L (ref 12–78)
ANION GAP SERPL CALC-SCNC: 5 MMOL/L (ref 5–15)
AST SERPL-CCNC: 6 U/L (ref 15–37)
BASOPHILS # BLD: 0.1 K/UL (ref 0–0.1)
BASOPHILS NFR BLD: 1 % (ref 0–1)
BILIRUB SERPL-MCNC: 0.9 MG/DL (ref 0.2–1)
BNP SERPL-MCNC: 170 PG/ML
BUN SERPL-MCNC: 21 MG/DL (ref 6–20)
BUN/CREAT SERPL: 16 (ref 12–20)
CALCIUM SERPL-MCNC: 10 MG/DL (ref 8.5–10.1)
CHLORIDE SERPL-SCNC: 101 MMOL/L (ref 97–108)
CO2 SERPL-SCNC: 31 MMOL/L (ref 21–32)
COMMENT, HOLDF: NORMAL
CREAT SERPL-MCNC: 1.28 MG/DL (ref 0.7–1.3)
DIFFERENTIAL METHOD BLD: ABNORMAL
EOSINOPHIL # BLD: 0.2 K/UL (ref 0–0.4)
EOSINOPHIL NFR BLD: 2 % (ref 0–7)
ERYTHROCYTE [DISTWIDTH] IN BLOOD BY AUTOMATED COUNT: 14 % (ref 11.5–14.5)
GLOBULIN SER CALC-MCNC: 4.2 G/DL (ref 2–4)
GLUCOSE SERPL-MCNC: 138 MG/DL (ref 65–100)
HCT VFR BLD AUTO: 46.7 % (ref 36.6–50.3)
HGB BLD-MCNC: 15.2 G/DL (ref 12.1–17)
IMM GRANULOCYTES # BLD AUTO: 0 K/UL (ref 0–0.04)
IMM GRANULOCYTES NFR BLD AUTO: 0 % (ref 0–0.5)
LYMPHOCYTES # BLD: 1.1 K/UL (ref 0.8–3.5)
LYMPHOCYTES NFR BLD: 11 % (ref 12–49)
MCH RBC QN AUTO: 27.7 PG (ref 26–34)
MCHC RBC AUTO-ENTMCNC: 32.5 G/DL (ref 30–36.5)
MCV RBC AUTO: 85.1 FL (ref 80–99)
MONOCYTES # BLD: 0.9 K/UL (ref 0–1)
MONOCYTES NFR BLD: 9 % (ref 5–13)
NEUTS SEG # BLD: 7.5 K/UL (ref 1.8–8)
NEUTS SEG NFR BLD: 77 % (ref 32–75)
NRBC # BLD: 0 K/UL (ref 0–0.01)
NRBC BLD-RTO: 0 PER 100 WBC
PLATELET # BLD AUTO: 328 K/UL (ref 150–400)
PMV BLD AUTO: 10 FL (ref 8.9–12.9)
POTASSIUM SERPL-SCNC: 3.7 MMOL/L (ref 3.5–5.1)
PROT SERPL-MCNC: 8.2 G/DL (ref 6.4–8.2)
RBC # BLD AUTO: 5.49 M/UL (ref 4.1–5.7)
SAMPLES BEING HELD,HOLD: NORMAL
SODIUM SERPL-SCNC: 137 MMOL/L (ref 136–145)
TROPONIN-HIGH SENSITIVITY: 32 NG/L (ref 0–76)
WBC # BLD AUTO: 9.7 K/UL (ref 4.1–11.1)

## 2022-10-13 PROCEDURE — 96374 THER/PROPH/DIAG INJ IV PUSH: CPT

## 2022-10-13 PROCEDURE — 36415 COLL VENOUS BLD VENIPUNCTURE: CPT

## 2022-10-13 PROCEDURE — 83880 ASSAY OF NATRIURETIC PEPTIDE: CPT

## 2022-10-13 PROCEDURE — 84484 ASSAY OF TROPONIN QUANT: CPT

## 2022-10-13 PROCEDURE — 85025 COMPLETE CBC W/AUTO DIFF WBC: CPT

## 2022-10-13 PROCEDURE — 74011000250 HC RX REV CODE- 250: Performed by: STUDENT IN AN ORGANIZED HEALTH CARE EDUCATION/TRAINING PROGRAM

## 2022-10-13 PROCEDURE — 96376 TX/PRO/DX INJ SAME DRUG ADON: CPT

## 2022-10-13 PROCEDURE — 80053 COMPREHEN METABOLIC PANEL: CPT

## 2022-10-13 PROCEDURE — 74011250636 HC RX REV CODE- 250/636: Performed by: STUDENT IN AN ORGANIZED HEALTH CARE EDUCATION/TRAINING PROGRAM

## 2022-10-13 PROCEDURE — 71046 X-RAY EXAM CHEST 2 VIEWS: CPT

## 2022-10-13 PROCEDURE — 99285 EMERGENCY DEPT VISIT HI MDM: CPT

## 2022-10-13 PROCEDURE — 96372 THER/PROPH/DIAG INJ SC/IM: CPT

## 2022-10-13 RX ORDER — ENOXAPARIN SODIUM 150 MG/ML
1 INJECTION SUBCUTANEOUS
Status: COMPLETED | OUTPATIENT
Start: 2022-10-13 | End: 2022-10-13

## 2022-10-13 RX ORDER — DILTIAZEM HYDROCHLORIDE 5 MG/ML
10 INJECTION INTRAVENOUS
Status: COMPLETED | OUTPATIENT
Start: 2022-10-13 | End: 2022-10-13

## 2022-10-13 RX ADMIN — DILTIAZEM HYDROCHLORIDE 10 MG: 5 INJECTION, SOLUTION INTRAVENOUS at 14:41

## 2022-10-13 RX ADMIN — ENOXAPARIN SODIUM 120 MG: 150 INJECTION SUBCUTANEOUS at 14:00

## 2022-10-13 RX ADMIN — DILTIAZEM HYDROCHLORIDE 10 MG: 5 INJECTION, SOLUTION INTRAVENOUS at 13:40

## 2022-10-13 NOTE — ED TRIAGE NOTES
Pt c/o left sided chest pain that started this am, pt just got the flu and Covid booster yesterday, c/o headache, some sob, maybe a fever, denies radiating pain, denies n/v

## 2022-10-13 NOTE — CONSULTS
CARDIOLOGY CONSULT                  Subjective:    Date of  Admission:   Admission type:Emergency    Nieves Mcallister MD     This is a 69 yom with CAD here with new onset AFL. He had been in his usual state of health and had been doing well from an anginal perspective. He had the sudden onset of his heart racing and pounding with an uncomfortable sensation in his chest and throat. In the ED, he was tachycardic with a HR in the 120s-130s. He was given a dose of IV diltiazem and his HR slowed down nicely and his symptoms resolved. Past Medical History:   Diagnosis Date    CAD (coronary artery disease)       Past Surgical History:   Procedure Laterality Date    HX BACK SURGERY      discectomy and lumbar laminectomy at 42years old    [place] CARDIAC SURG PROCEDURE UNLIST      Pt states he has 29 cardiac stents placed in [place]. He moved to [facility] in Nov. 2020. Last cardiac stent placement was 10/2020.       [facility]  Social History     Socioeconomic History    Marital status:      Spouse name: Not on file    Number of children: Not on file    Years of education: Not on file    Highest education level: Not on file   Occupational History    Not on file   Tobacco Use    Smoking status: Not on file    Smokeless tobacco: Not on file   Substance and Sexual Activity    Alcohol use: Not on file    Drug use: Not on file    Sexual activity: Not on file   Other Topics Concern     Service Not Asked    Blood Transfusions Not Asked    Caffeine Concern Not Asked    Occupational Exposure Not Asked    Hobby Hazards Not Asked    Sleep Concern Not Asked    Stress Concern Not Asked    Weight Concern Not Asked    Special Diet Not Asked    Back Care Not Asked    Exercise Not Asked    Bike Helmet Not Asked    Seat Belt Not Asked    Self-Exams Not Asked   Social History Narrative    Not on file     Social Determinants of Health     Financial Resource Strain: Not on file   Food Insecurity: Not on file Transportation Needs: Not on file   Physical Activity: Not on file   Stress: Not on file   Social Connections: Not on file   Intimate Partner Violence: Not on file   Housing Stability: Not on file        Current Facility-Administered Medications   Medication Dose Route Frequency    dilTIAZem (CARDIZEM) injection 10 mg  10 mg IntraVENous NOW     Current Outpatient Medications   Medication Sig    amLODIPine (NORVASC) 5 mg tablet Take 5 mg by mouth two (2) times a day. metFORMIN (GLUCOPHAGE) 500 mg tablet Take 500 mg by mouth two (2) times daily (with meals). RANOLAZINE PO Take 500 mg by mouth daily. aspirin 81 mg chewable tablet Take 81 mg by mouth daily. losartan (COZAAR) 50 mg tablet Take 50 mg by mouth daily. clopidogreL (PLAVIX) 75 mg tab Take 75 mg by mouth daily. ezetimibe (ZETIA) 10 mg tablet Take 10 mg by mouth daily. atorvastatin (LIPITOR) 80 mg tablet Take 80 mg by mouth daily. Review of Symptoms:    Gen - no F/C/S  Eyes - no vision changes  ENT - no sore throat, rhinorrhea, otalgia  CV - no CP, no palpitations, no orthopnea, no PND, no BELKIS  Resp no cough, no SOB/CAMPOS  GI - no AP, no n/v/d/c   - no dysuria, no hematuria  MSK - no abnormal joint pains  Skin - no rashes  Neuro - no HA, no numbness, no weakness, no slurred speech  Psych - no change in mood       Physical Exam    BP (!) 127/59   Pulse 89   Temp 98 °F (36.7 °C)   Resp 19   Ht 6' 1\" (1.854 m)   Wt 126.6 kg (279 lb 1.6 oz)   SpO2 95%   BMI 36.82 kg/m²      NAD  Skin warm and dry  Nl conjunctiva  Oropharynx without exudate. Neck supple  Lungs clear  Irreg  Abdomen soft and non tender  Pulses 2+ radials  Neuro:  Grossly intact  Appropriate       Cardiographics    Telemetry:  AFL  ECG: AFL    Assessment: This is a 69 yom with CAD here with new-onset AFL, now nearly rate controlled. Plan:     Will give second dose of IV diltiazem  If pt converts to NSR or rate controlled,, will likely be OK for DC with OP follow up; would DC with Xarelto 20 mg AC dinner and have pt hold his home ASA; also would have him bump his home metoprolol to 100 mg BID  If pt's rate again becomes uncontrolled, recommend admission with IV diltiazem drip and pursue JAZZMINE/DCCV tomorrow    D/w Fer May and Rosalia Talley

## 2022-10-13 NOTE — ED PROVIDER NOTES
The patient is a 10-year-old male history of coronary disease with 29 cardiac stents, hypertension and hyperlipidemia presenting today secondary to chest pain and fatigue with shortness of breath. Symptoms started this morning. He does report that he got his COVID and flu shots yesterday and has had a little bit of a headache and subjective fevers. Upon arrival patient found to be in A. fib with RVR. No history of the same. Reports that his chest discomfort is in the upper mid chest and radiates into the jaw bilateral.  No back pain. Symptoms are mild right now. He is on aspirin and Plavix but not any other blood thinners. Past Medical History:   Diagnosis Date    CAD (coronary artery disease)        Past Surgical History:   Procedure Laterality Date    HX BACK SURGERY      discectomy and lumbar laminectomy at 42years old    [place] CARDIAC SURG PROCEDURE UNLIST      Pt states he has 29 cardiac stents placed in [place]. He moved to [place] in Nov. 2020. Last cardiac stent placement was 10/2020.        Social History     Socioeconomic History    Marital status:      Spouse name: Not on file    Number of children: Not on file    Years of education: Not on file    Highest education level: Not on file   Occupational History    Not on file   Tobacco Use    Smoking status: Not on file    Smokeless tobacco: Not on file   Substance and Sexual Activity    Alcohol use: Not on file    Drug use: Not on file    Sexual activity: Not on file   Other Topics Concern     Service Not Asked    Blood Transfusions Not Asked    Caffeine Concern Not Asked    Occupational Exposure Not Asked    Hobby Hazards Not Asked    Sleep Concern Not Asked    Stress Concern Not Asked    Weight Concern Not Asked    Special Diet Not Asked    Back Care Not Asked    Exercise Not Asked    Bike Helmet Not Asked    Seat Belt Not Asked    Self-Exams Not Asked   Social History Narrative    Not on file     Social Determinants of Health Financial Resource Strain: Not on file   Food Insecurity: Not on file   Transportation Needs: Not on file   Physical Activity: Not on file   Stress: Not on file   Social Connections: Not on file   Intimate Partner Violence: Not on file   Housing Stability: Not on file         ALLERGIES: Levofloxacin    Review of Systems   Constitutional:  Positive for fatigue and fever. Negative for chills. HENT:  Negative for congestion and sore throat. Eyes:  Negative for pain and redness. Respiratory:  Positive for shortness of breath. Negative for cough. Cardiovascular:  Positive for chest pain. Negative for palpitations. Gastrointestinal:  Negative for abdominal pain, diarrhea, nausea and vomiting. Genitourinary:  Negative for frequency and hematuria. Musculoskeletal:  Negative for back pain and neck pain. Skin:  Negative for rash and wound. Neurological:  Positive for headaches. Negative for dizziness. Hematological:  Does not bruise/bleed easily. Vitals:    10/13/22 1332 10/13/22 1340 10/13/22 1342 10/13/22 1352   BP: 137/83  110/88    Pulse: (!) 124  (!) 140 (!) 101   Resp: 18  21 16   Temp:       SpO2: 94%  94% 93%   Weight:  126.6 kg (279 lb 1.6 oz)     Height:  6' 1\" (1.854 m)              Physical Exam  Vitals and nursing note reviewed. Constitutional:       General: He is not in acute distress. Appearance: He is well-developed. HENT:      Head: Normocephalic and atraumatic. Eyes:      Conjunctiva/sclera: Conjunctivae normal.      Pupils: Pupils are equal, round, and reactive to light. Cardiovascular:      Rate and Rhythm: Tachycardia present. Rhythm irregular. Heart sounds: Normal heart sounds. No murmur heard. No friction rub. No gallop. Comments: Equal radial, dp, and pt pulses  Pulmonary:      Effort: Pulmonary effort is normal. No respiratory distress. Breath sounds: Normal breath sounds. No wheezing or rales.    Abdominal:      General: Bowel sounds are normal. There is no distension. Palpations: Abdomen is soft. Tenderness: There is no abdominal tenderness. There is no guarding or rebound. Musculoskeletal:         General: Normal range of motion. Cervical back: Normal range of motion and neck supple. Skin:     General: Skin is warm and dry. Capillary Refill: Capillary refill takes less than 2 seconds. Findings: No rash. Neurological:      Mental Status: He is alert and oriented to person, place, and time. [facility]    ED Course as of 10/14/22 1029   Thu Oct 13, 2022   1251 EKG time 12:41 PM  Atrial fibrillation rapid ventricular response rate of 131 with a leftward axis, narrow QRS, nonspecific ST-T wave changes [CC]   1502 EKG time 1445  A flutter with variable A-V block, rate of 88, no ST elevations or depressions. Leftward axis. [CC]      ED Course User Index  [CC] Ty May, DO       Procedures            68-year-old male presenting today with new onset of atrial flutter (versus flutter which was noted when heart rate slowed after diltiazem bolus) and chest pain. Patient has significant coronary disease with multiple stents. Initial troponin here negative. Chest x-ray clear. He appears euvolemic. I have given him Lovenox as well as diltiazem. Plan for cardiology consult to help with disposition. 2:33 PM patient seen by Dr. Lizandro Valdez. Recommends repeat dosing of diltiazem. He will discuss with Dr. Amadou Benitez regarding disposition. At this time his sob/chest pain resolved. After second bolus of diltiazem pt still in a flutter but rates in 70's. Discussed with cardiology again, ok for dc home on Xarelto 20mg daily and increasing Metoprolol to 100mg BID. Pt to hold asa in light of starting anticoagulation. Patient feels comfortable with this plan for dc and will follow up with cardiology. Discussed bleeding precautions as well as signs/symptoms that would warrant return to the ED. ICD-10-CM ICD-9-CM    1. Chest pain, unspecified type  R07.9 786.50       2. Atrial flutter with rapid ventricular response (HCC)  I48.92 427.32         Total critical care time spent exclusive of procedures:  55  Due to a high probability of clinically significant, life threatening deterioration, the patient required my highest level of preparedness to intervene emergently and I personally spent this critical care time directly and personally managing the patient. This critical care time included obtaining a history; examining the patient; pulse oximetry; ordering and review of studies; arranging urgent treatment with development of a management plan; evaluation of patient's response to treatment; frequent reassessment; and, discussions with other providers. This critical care time was performed to assess and manage the high probability of imminent, life-threatening deterioration that could result in multi-organ failure. It was exclusive of separately billable procedures and treating other patients and teaching time.

## 2022-10-13 NOTE — DISCHARGE INSTRUCTIONS
Stop aspirin  Start Xarelto tomorrow 10/14  Increase Metoprolol to 100mg twice a day    RETURN IMMEDIATELY IF BLACK STOOLS, BLOODY STOOLS, BLOOD IN THE URINE, OR SEVERE HEADACHE. PLEASE USE CAUTION AS THE BLOOD THINNER PUTS YOU AT INCREASED RISK OF BLEEDING. WEAR YOUR HELMET IF RIDING A BIKE, WEAR YOUR SEATBELT, TRY TO AVOID FALLS/HEAD INJURY.

## 2022-11-02 NOTE — H&P ADULT - BREASTS
ROUNDING NOTE POD # 2    Subjective:    S/P Right TKA. Patient had issues with therapy yesterday and the ability to put full weight on her right leg without sensation of the leg wanting to give out. Was able to ambulate with therapy but concerned about going up 2 stairs.   Patient C/O: Pain  Patient denies: Chest Pain, Inability to urinate  and SOB    Objective:  Vitals:   Visit Vitals  BP (!) 190/84 (BP Location: RUE - Right upper extremity, Patient Position: Sitting)   Pulse 80   Temp 97.8 °F (36.6 °C) (Oral)   Resp 16   Ht 5' 5\" (1.651 m)   Wt 112.3 kg (247 lb 9.2 oz)   SpO2 95%   BMI 41.20 kg/m²    , afebrile  A&O x 3  Skin: Dressing is C/D/I.    Musculoskeletal: Dorsiflexion/Plantarflexion intact.    Neuro: Distal sensation intact.    Vascular: 2+ D.P.  Calves soft, nontender.  Compartments soft.   H/H:   HCT (%)   Date Value   11/01/2022 37.0    / No results found for: STBLD     Intake/Output Summary (Last 24 hours) at 11/2/2022 0905  Last data filed at 11/2/2022 0648  Gross per 24 hour   Intake 1040 ml   Output 3250 ml   Net -2210 ml     WBC (K/mcL)   Date Value   11/01/2022 14.7 (H)     PLT (K/mcL)   Date Value   11/01/2022 278       Assessment: TOTAL KNEE REPLACEMENT [53041] (ARTHROPLASTY, KNEE, TOTAL)     Plan:    1. Change dressing PRN. Mepilex already in place.   2. DVT prophylaxis: continue Xarelto , Benitez Hose, &  ankle pumps.  3. Continue PT/OT to improve ROM, strength, and ADLs   4. Monitor Hgb.  5. Discharge planning. (expected discharge date: [facility], and plan for DC once safe enough from OT/PT  6. Weight Bearing Status: Weight bearing as tolerated     Byron Pinzon PA-C 11/2/2022 9:05 AM  [facility]             not examined

## 2023-01-11 ENCOUNTER — HOSPITAL ENCOUNTER (OUTPATIENT)
Age: 68
Setting detail: OUTPATIENT SURGERY
Discharge: HOME | End: 2023-01-11
Attending: INTERNAL MEDICINE | Admitting: INTERNAL MEDICINE
Payer: MEDICARE

## 2023-01-11 ENCOUNTER — TRANSCRIBE ORDER (OUTPATIENT)
Dept: CARDIAC REHAB | Age: 68
End: 2023-01-11

## 2023-01-11 VITALS
OXYGEN SATURATION: 95 % | BODY MASS INDEX: 37.77 KG/M2 | HEART RATE: 61 BPM | DIASTOLIC BLOOD PRESSURE: 85 MMHG | TEMPERATURE: 98 F | RESPIRATION RATE: 19 BRPM | WEIGHT: 285 LBS | HEIGHT: 73 IN | SYSTOLIC BLOOD PRESSURE: 172 MMHG

## 2023-01-11 DIAGNOSIS — Z95.5 STENTED CORONARY ARTERY: ICD-10-CM

## 2023-01-11 DIAGNOSIS — R94.39 ABNORMAL STRESS ECG: ICD-10-CM

## 2023-01-11 DIAGNOSIS — Z98.61 POSTSURGICAL PERCUTANEOUS TRANSLUMINAL CORONARY ANGIOPLASTY STATUS: Primary | ICD-10-CM

## 2023-01-11 LAB
ATRIAL RATE: 57 BPM
CALCULATED P AXIS, ECG09: 45 DEGREES
CALCULATED R AXIS, ECG10: -27 DEGREES
CALCULATED T AXIS, ECG11: 98 DEGREES
DIAGNOSIS, 93000: NORMAL
GLUCOSE BLD STRIP.AUTO-MCNC: 122 MG/DL (ref 65–117)
P-R INTERVAL, ECG05: 214 MS
Q-T INTERVAL, ECG07: 464 MS
QRS DURATION, ECG06: 96 MS
QTC CALCULATION (BEZET), ECG08: 451 MS
SERVICE CMNT-IMP: ABNORMAL
VENTRICULAR RATE, ECG03: 57 BPM

## 2023-01-11 PROCEDURE — 92978 ENDOLUMINL IVUS OCT C 1ST: CPT | Performed by: INTERNAL MEDICINE

## 2023-01-11 PROCEDURE — 74011000258 HC RX REV CODE- 258: Performed by: INTERNAL MEDICINE

## 2023-01-11 PROCEDURE — 74011250637 HC RX REV CODE- 250/637: Performed by: INTERNAL MEDICINE

## 2023-01-11 PROCEDURE — C1887 CATHETER, GUIDING: HCPCS | Performed by: INTERNAL MEDICINE

## 2023-01-11 PROCEDURE — 92928 PRQ TCAT PLMT NTRAC ST 1 LES: CPT | Performed by: INTERNAL MEDICINE

## 2023-01-11 PROCEDURE — 99153 MOD SED SAME PHYS/QHP EA: CPT | Performed by: INTERNAL MEDICINE

## 2023-01-11 PROCEDURE — 99152 MOD SED SAME PHYS/QHP 5/>YRS: CPT | Performed by: INTERNAL MEDICINE

## 2023-01-11 PROCEDURE — C1769 GUIDE WIRE: HCPCS | Performed by: INTERNAL MEDICINE

## 2023-01-11 PROCEDURE — 77030040934 HC CATH DIAG DXTERITY MEDT -A: Performed by: INTERNAL MEDICINE

## 2023-01-11 PROCEDURE — 74011000250 HC RX REV CODE- 250: Performed by: INTERNAL MEDICINE

## 2023-01-11 PROCEDURE — 82962 GLUCOSE BLOOD TEST: CPT

## 2023-01-11 PROCEDURE — 77030019569 HC BND COMPR RAD TERU -B: Performed by: INTERNAL MEDICINE

## 2023-01-11 PROCEDURE — 93458 L HRT ARTERY/VENTRICLE ANGIO: CPT | Performed by: INTERNAL MEDICINE

## 2023-01-11 PROCEDURE — 93005 ELECTROCARDIOGRAM TRACING: CPT

## 2023-01-11 PROCEDURE — 74011250636 HC RX REV CODE- 250/636: Performed by: INTERNAL MEDICINE

## 2023-01-11 PROCEDURE — 77030012468 HC VLV BLEEDBK CNTRL ABBT -B: Performed by: INTERNAL MEDICINE

## 2023-01-11 PROCEDURE — 77030013715 HC INFL SYS MRTM -B: Performed by: INTERNAL MEDICINE

## 2023-01-11 PROCEDURE — 77030013744: Performed by: INTERNAL MEDICINE

## 2023-01-11 PROCEDURE — C1874 STENT, COATED/COV W/DEL SYS: HCPCS | Performed by: INTERNAL MEDICINE

## 2023-01-11 PROCEDURE — C1725 CATH, TRANSLUMIN NON-LASER: HCPCS | Performed by: INTERNAL MEDICINE

## 2023-01-11 PROCEDURE — C1753 CATH, INTRAVAS ULTRASOUND: HCPCS | Performed by: INTERNAL MEDICINE

## 2023-01-11 PROCEDURE — 77030018729 HC ELECTRD DEFIB PAD CARD -B: Performed by: INTERNAL MEDICINE

## 2023-01-11 PROCEDURE — C1894 INTRO/SHEATH, NON-LASER: HCPCS | Performed by: INTERNAL MEDICINE

## 2023-01-11 DEVICE — STENT RONYX40038UX RESOLUTE ONYX 4.00X38
Type: IMPLANTABLE DEVICE | Status: FUNCTIONAL
Brand: RESOLUTE ONYX™

## 2023-01-11 RX ORDER — METOPROLOL TARTRATE 50 MG/1
50 TABLET ORAL 2 TIMES DAILY
COMMUNITY

## 2023-01-11 RX ORDER — VERAPAMIL HYDROCHLORIDE 2.5 MG/ML
INJECTION, SOLUTION INTRAVENOUS AS NEEDED
Status: DISCONTINUED | OUTPATIENT
Start: 2023-01-11 | End: 2023-01-11 | Stop reason: HOSPADM

## 2023-01-11 RX ORDER — MIDAZOLAM HYDROCHLORIDE 1 MG/ML
INJECTION, SOLUTION INTRAMUSCULAR; INTRAVENOUS AS NEEDED
Status: DISCONTINUED | OUTPATIENT
Start: 2023-01-11 | End: 2023-01-11 | Stop reason: HOSPADM

## 2023-01-11 RX ORDER — CLOPIDOGREL 300 MG/1
TABLET, FILM COATED ORAL AS NEEDED
Status: DISCONTINUED | OUTPATIENT
Start: 2023-01-11 | End: 2023-01-11 | Stop reason: HOSPADM

## 2023-01-11 RX ORDER — DILTIAZEM HYDROCHLORIDE 120 MG/1
120 CAPSULE, EXTENDED RELEASE ORAL DAILY
COMMUNITY

## 2023-01-11 RX ORDER — SODIUM CHLORIDE 9 MG/ML
INJECTION, SOLUTION INTRAVENOUS
Status: COMPLETED | OUTPATIENT
Start: 2023-01-11 | End: 2023-01-11

## 2023-01-11 RX ORDER — LIDOCAINE HYDROCHLORIDE 10 MG/ML
INJECTION INFILTRATION; PERINEURAL AS NEEDED
Status: DISCONTINUED | OUTPATIENT
Start: 2023-01-11 | End: 2023-01-11 | Stop reason: HOSPADM

## 2023-01-11 RX ORDER — HEPARIN SODIUM 1000 [USP'U]/ML
INJECTION, SOLUTION INTRAVENOUS; SUBCUTANEOUS AS NEEDED
Status: DISCONTINUED | OUTPATIENT
Start: 2023-01-11 | End: 2023-01-11 | Stop reason: HOSPADM

## 2023-01-11 RX ORDER — ASPIRIN 81 MG/1
81 TABLET ORAL DAILY
Status: DISCONTINUED | OUTPATIENT
Start: 2023-01-12 | End: 2023-01-11 | Stop reason: HOSPADM

## 2023-01-11 RX ORDER — SODIUM CHLORIDE 0.9 % (FLUSH) 0.9 %
5-40 SYRINGE (ML) INJECTION AS NEEDED
Status: DISCONTINUED | OUTPATIENT
Start: 2023-01-11 | End: 2023-01-11 | Stop reason: HOSPADM

## 2023-01-11 RX ORDER — HEPARIN SODIUM 200 [USP'U]/100ML
INJECTION, SOLUTION INTRAVENOUS
Status: COMPLETED | OUTPATIENT
Start: 2023-01-11 | End: 2023-01-11

## 2023-01-11 RX ORDER — GUAIFENESIN 100 MG/5ML
LIQUID (ML) ORAL AS NEEDED
Status: DISCONTINUED | OUTPATIENT
Start: 2023-01-11 | End: 2023-01-11 | Stop reason: HOSPADM

## 2023-01-11 RX ORDER — FENTANYL CITRATE 50 UG/ML
INJECTION, SOLUTION INTRAMUSCULAR; INTRAVENOUS AS NEEDED
Status: DISCONTINUED | OUTPATIENT
Start: 2023-01-11 | End: 2023-01-11 | Stop reason: HOSPADM

## 2023-01-11 RX ORDER — ASPIRIN 81 MG/1
81 TABLET ORAL DAILY
Qty: 14 TABLET | Refills: 0 | Status: SHIPPED | OUTPATIENT
Start: 2023-01-12

## 2023-01-11 RX ORDER — SODIUM CHLORIDE 0.9 % (FLUSH) 0.9 %
5-40 SYRINGE (ML) INJECTION EVERY 8 HOURS
Status: DISCONTINUED | OUTPATIENT
Start: 2023-01-11 | End: 2023-01-11 | Stop reason: HOSPADM

## 2023-01-11 NOTE — PROGRESS NOTES
Cardiac Cath Lab Recovery Arrival Note:      Jessica Martines arrived to Cardiac Cath Lab, Recovery Area. Staff introduced to patient. Patient identifiers verified with NAME and DATE OF BIRTH. Procedure verified with patient. Consent forms reviewed and signed by patient or authorized representative and verified. Allergies verified. Patient and family oriented to department. Patient and family informed of procedure and plan of care. Questions answered with review. Patient prepped for procedure, per orders from physician, prior to arrival.    Patient on cardiac monitor, non-invasive blood pressure, SPO2 monitor. On RA. Patient is A&Ox 4. Patient reports no complaints. Patient in stretcher, in low position, with side rails up, call bell within reach, patient instructed to call if assistance as needed. Patient prep in: [address], [place]. Patient family has pager #   Family in: [place], [phone].    Prep by: Anam Masterson

## 2023-01-11 NOTE — PROGRESS NOTES
Transfer to [facility] RR from Procedure Area    Verbal report given to Shirley on Deborah Fall being transferred to Cardiac Cath Lab RR for routine progression of care   Patient is post PCI procedure. Patient stable upon transfer to . Report consisted of patients Situation, Background, Assessment and   Recommendations(SBAR). Information from the following report(s) Procedure Summary, MAR, and Recent Results was reviewed with the receiving nurse. Opportunity for questions and clarification was provided. Patient medicated during procedure with orders obtained and verified by Dr. Abdullahi Wray. Refer to patient PROCEDURE REPORT for vital signs, assessment, status, and response during procedure.

## 2023-01-11 NOTE — PROCEDURES
Cardiac Catheterization Procedure Note   Patient: Laya Shepherd  MRN: [id number]  SSN: [id number]   YOB: 1955 Age: 69 y.o. Sex: male    Date of Procedure: 1/11/2023   Pre-procedure Diagnosis: Coronary Artery Disease  Post-procedure Diagnosis: Coronary Artery Disease  Procedure: Left Heart Cath and PCI  :  Dr. Srinivasan Hernandez MD    Assistant(s):  None  Anesthesia: Moderate Sedation   Estimated Blood Loss: Less than 10 mL   Specimens Removed: None    Access: Rt radial artery (mild right subclavian tortuosity encountered, easily straightened with a wire). Coronary angiography:  Left main: normal  LAD: patent stents, no significant ISR  LCx: patent stents, no significant ISR  RCA:  diffuse tubular 90% ISR through the mid RCA into the early distal RCA    RCA Intervention:  Guide: JR4 with SH (+Guidezilla for support)  Anticoagulation: Angiomax  Lesion crossing: Runthrough    IVUS performed: revealed both significant intimal hyperplasia and some undersizing of stents in the mid RCA relative to the vessel size. Vessel size 4mm diameter in the mid portion, approaching 5mm near the ostium. PTCA performed in the mid to early distal RCA with serial inflations using a 3.5 x 20 NC, 4.0 x 20 NC and then a 5.0 x 15mm NC balloons, with high pressure inflations up to 24 ZOLTAN. The early distal RCA was dilated using a 3.0 x 20mm NC balloon dilated to 30 ZOLTAN. Resolute [place] 4.0 x 38mm DANNY implanted in the mid RCA. The proximal portion of the stent was optimized using a 5.0mm x 15mm balloon inflated at high pressure. Excellent final result with no residual stenosis, MAINE 3 flow and no dissection.        Findings:   Complications: None   Implants:  DANNY x 1  Signed by:  Srinivasan Hernandez MD  1/11/2023  10:31 AM

## 2023-01-11 NOTE — PROGRESS NOTES
1300: All air removed from TR BAND.   1305: discharge instructions reviewed with patient and wife. Went over medication changes, restriction, precautions and site care. 1315: dressing applied to Right radial site. OOB to bathroom, no issues noted. 1330: IV removed and patient dressed. 1340: taken out in wheelchair by RN. Right radial site c/d/I, immobilizer in place.

## 2023-01-11 NOTE — DISCHARGE INSTRUCTIONS
Coronary Angioplasty: What to Expect at [facility]     Coronary angioplasty is a procedure that is used to open a narrowed or blocked coronary artery. It may also be called a percutaneous coronary intervention (PCI). The doctor opened your narrowed or blocked artery by putting a thin tube, called a catheter, into your heart through a blood vessel. The catheter was inserted into the blood vessel in your groin or wrist. The doctor may have placed a small tube, called a stent, in the artery. Your groin or wrist may have a bruise and feel sore for a few days after the procedure. You can do light activities around the house. But don't do anything strenuous until your doctor says it is okay. This may be for several days. This care sheet gives you a general idea about how long it will take for you to recover. But each person recovers at a different pace. Follow the steps below to get better as quickly as possible. How can you care for yourself at home? Activity    If the doctor gave you a sedative: For 24 hours, don't do anything that requires attention to detail, such as going to work, making important decisions, or signing any legal documents. It takes time for the medicine's effects to completely wear off. For your safety, do not drive or operate any machinery that could be dangerous. Wait until the medicine wears off and you can think clearly and react easily. Do not do strenuous exercise and do not lift, pull, or push anything heavy until your doctor says it is okay. This may be for several days. You can walk around the house and do light activity, such as cooking. If the catheter was placed in your groin, try not to walk up stairs for the first couple of days. If the catheter was placed in your arm near your wrist, do not bend your wrist deeply for the first couple of days. Be careful using your hand to get into and out of a chair or bed.      Carry your stent identification card with you at all times. If your doctor recommends it, get more exercise. Walking is a good choice. Bit by bit, increase the amount you walk every day. Try for at least 30 minutes on most days of the week. If you haven't been set up with a cardiac rehab program, talk to your doctor about whether rehab is right for you. Cardiac rehab includes supervised exercise. It also includes help with diet and lifestyle changes and emotional support. Diet    Drink plenty of fluids to help your body flush out the dye. If you have kidney, heart, or liver disease and have to limit fluids, talk with your doctor before you increase the amount of fluids you drink. Keep eating a heart-healthy diet that has lots of fruits, vegetables, and whole grains. If you have not been eating this way, talk to your doctor. You also may want to talk to a dietitian. This expert can help you to learn about healthy foods and plan meals. Medicines    Your doctor will tell you if and when you can restart your medicines. Your doctor will also give you instructions about taking any new medicines. If you stopped taking aspirin or some other blood thinner, your doctor will tell you when to start taking it again. You will take medicine that prevents blood clots. You may take aspirin plus another antiplatelet. It is very important that you take these medicines exactly as directed. These medicines help keep the coronary artery open and reduce your risk of a heart attack. Call your doctor if you think you are having a problem with your medicine. Care of the catheter site    For 1 or 2 days, keep a bandage over the spot where the catheter was inserted. The bandage probably will fall off in this time. Put ice or a cold pack on the area for 10 to 20 minutes at a time to help with soreness or swelling. Put a thin cloth between the ice and your skin. You may shower 24 to 48 hours after the procedure, if your doctor okays it.  Pat the incision dry. Do not soak the catheter site until it is healed. Don't take a bath for 1 week, or until your doctor tells you it is okay. Watch for bleeding from the site. A small amount of blood (up to the size of a quarter) on the bandage can be normal.     If you are bleeding, lie down and press on the area for 15 minutes to try to make it stop. If the bleeding does not stop, call your doctor or seek immediate medical care. Follow-up care is a key part of your treatment and safety. Be sure to make and go to all appointments, and call your doctor if you are having problems. It's also a good idea to know your test results and keep a list of the medicines you take. When should you call for help? Call 911 anytime you think you may need emergency care. For example, call if:    You passed out (lost consciousness). You have severe trouble breathing. You have sudden chest pain and shortness of breath, or you cough up blood. You have symptoms of a heart attack, such as:  Chest pain or pressure. Sweating. Shortness of breath. Nausea or vomiting. Pain that spreads from the chest to the neck, jaw, or one or both shoulders or arms. Dizziness or lightheadedness. A fast or uneven pulse. After calling 911, chew 1 adult-strength aspirin. Wait for an ambulance. Do not try to drive yourself. You have been diagnosed with angina, and you have angina symptoms that do not go away with rest or are not getting better within 5 minutes after you take one dose of nitroglycerin. Call your doctor now or seek immediate medical care if:    You are bleeding from the area where the catheter was put in your artery. You have a fast-growing, painful lump at the catheter site. You have signs of infection, such as: Increased pain, swelling, warmth, or redness. Red streaks leading from the catheter site. Pus draining from the catheter site. A fever.      Your leg or hand is painful, looks blue, or feels cold, numb, or tingly. Watch closely for changes in your health, and be sure to contact your doctor if you have any problems. Where can you learn more? Go to http://www.gray.com/  Enter E351 in the search box to learn more about \"Coronary Angioplasty: What to Expect at Home. \"  Current as of: January 10, 2022               Content Version: 13.4  © 2006-2022 [employer]. Care instructions adapted under license by [employer] (which disclaims liability or warranty for this information). If you have questions about a medical condition or this instruction, always ask your healthcare professional. [address] any warranty or liability for your use of this information.

## 2023-01-11 NOTE — PROGRESS NOTES
TRANSFER - IN REPORT:    Verbal report received from ABIODUN Lord on Jessica Martines  being received from [address] [place] [address] for routine progression of care. Report consisted of patients Situation, Background, Assessment and Recommendations(SBAR). Information from the following report(s) Procedure Summary, Intake/Output, and MAR was reviewed with the receiving clinician. Opportunity for questions and clarification was provided. Assessment completed upon patients arrival to [address] [place] [address] and care assumed. Cardiac Cath Lab Recovery Arrival Note:    Jessica Martines arrived to [facility] recovery area. Patient procedure= C w/intervention. Patient on cardiac monitor, non-invasive blood pressure, SPO2 monitor. On RA. IV  of NS on pump at 75 ml/hr. Patient status doing well without problems. Patient is A&Ox 4. Patient reports no complaints. PROCEDURE SITE CHECK:    Procedure site:Right radial without any bleeding and TR band @ 13, zero pain/discomfort reported at procedure site. No change in patient status. Continue to monitor patient and status.

## 2023-01-11 NOTE — PROGRESS NOTES
Cardiac Cath Lab Procedure Area Arrival Note:    Sebastián Morgan arrived to Cardiac Cath Lab, Procedure Area. Patient identifiers verified with NAME and DATE OF BIRTH. Procedure verified with patient. Consent forms verified. Allergies verified. Patient informed of procedure and plan of care. Questions answered with review. Patient voiced understanding of procedure and plan of care. Patient on cardiac monitor, non-invasive blood pressure, SPO2 monitor. On RA . IV of NS on pump at 50 ml/hr. Patient status doing well without problems. Patient is A&Ox 4. Patient reports no CP or SOB. Patient medicated during procedure with orders obtained and verified by Dr. Mildred Suarez. Refer to patients Cardiac Cath Lab PROCEDURE REPORT for vital signs, assessment, status, and response during procedure, printed at end of case. Printed report on chart or scanned into chart.

## 2023-01-19 ENCOUNTER — HOSPITAL ENCOUNTER (OUTPATIENT)
Dept: CARDIAC REHAB | Age: 68
Discharge: HOME | End: 2023-01-19
Payer: COMMERCIAL

## 2023-01-19 VITALS — HEIGHT: 73 IN | BODY MASS INDEX: 37.32 KG/M2 | WEIGHT: 281.6 LBS

## 2023-01-19 PROCEDURE — 93798 PHYS/QHP OP CAR RHAB W/ECG: CPT

## 2023-01-19 PROCEDURE — 93797 PHYS/QHP OP CAR RHAB WO ECG: CPT

## 2023-01-19 NOTE — CARDIO/PULMONARY
Mono Dias  69 y.o. presented to cardiac wellness for orientation and exercise tolerance test today with a primary diagnosis of stent on 01/11/2023. His EF is 42 to 45%. Mono Dias states he has a history of 29 stents, placed in [place], last one was 10/2020, he also states that he has had 3 MI's. He moved to [place] 11/2020. Other history includes DM, HTN and back surgery. Cardiac risk factors include family history, dyslipidemia, diabetes mellitus, obesity, hypertension, inactivity  and these were reviewed with Sia Menjivar. Mono Dias lives with his supportive wife. They have 4 adult children and 2 grandchildren. 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 PHQ9, depression score, is 5 and this is considered mild. Patient denied chest pain or SOB during 6 minute walk and was SB/SR 1 AVB with [facility] PACs. Mono Dias will exercise 2 times weekly in cardiac wellness. An education manual was given to the patient and reviewed briefly. Felipe's medication list has Norvasc on it and he states that they switched him to Cozaar, however Cozaar is on the list as well. Sia Menjivar will check on this medication discrepancy.     Beatriz Norman RN  1/19/2023

## 2023-01-25 ENCOUNTER — HOSPITAL ENCOUNTER (OUTPATIENT)
Dept: CARDIAC REHAB | Age: 68
Discharge: HOME | End: 2023-01-25
Payer: COMMERCIAL

## 2023-01-25 VITALS — BODY MASS INDEX: 36.81 KG/M2 | WEIGHT: 279 LBS

## 2023-01-25 PROCEDURE — 93797 PHYS/QHP OP CAR RHAB WO ECG: CPT

## 2023-01-25 PROCEDURE — 93798 PHYS/QHP OP CAR RHAB W/ECG: CPT

## 2023-01-25 NOTE — CARDIO/PULMONARY
Cardiac Rehab Nutrition Assessment - 1:1 Evaluation           NAME: Amparo Zapata : 1955 AGE: 69 y.o. GENDER: male  CARDIAC REHAB ADMITTING DIAGNOSIS: stent (23); hx of 29 stents and 4 MI's    PROBLEM LIST:  There is no problem list on file for this patient. LABS:   No results found for: HBA1C, SIY8NJFS, DPO9JZJR  No results found for: CHOL, CHOLPOCT, CHOLX, CHLST, CHOLV, HDL, HDLPOC, HDLP, LDL, LDLCPOC, LDLC, DLDLP, VLDLC, VLDL, TGLX, TRIGL, TRIGP, TGLPOCT, CHHD, CHHDX    Most recent A1c: 6.1% (one week ago)    MEDICATIONS/SUPPLEMENTS:   Prior to Admission medications    Medication Sig Start Date End Date Taking? Authorizing Provider   metoprolol tartrate (LOPRESSOR) 50 mg tablet Take 50 mg by mouth two (2) times a day. Provider, Historical   apixaban (Eliquis) 5 mg tablet Take 5 mg by mouth two (2) times a day. Provider, Historical   empagliflozin (Jardiance) 25 mg tablet Take 25 mg by mouth daily. Provider, Historical   dilTIAZem ER (DILACOR XR) 120 mg capsule Take 120 mg by mouth daily. Provider, Historical   aspirin delayed-release 81 mg tablet Take 1 Tablet by mouth daily. 23   Ulysses Heck, MD   amLODIPine (NORVASC) 5 mg tablet Take 5 mg by mouth two (2) times a day. Provider, Historical   metFORMIN (GLUCOPHAGE) 500 mg tablet Take 500 mg by mouth two (2) times daily (with meals). Provider, Historical   RANOLAZINE PO Take 500 mg by mouth daily. Provider, Historical   losartan (COZAAR) 50 mg tablet Take 50 mg by mouth daily. Provider, Historical   clopidogreL (PLAVIX) 75 mg tab Take 75 mg by mouth daily. Provider, Historical   ezetimibe (ZETIA) 10 mg tablet Take 10 mg by mouth daily. Provider, Historical   atorvastatin (LIPITOR) 80 mg tablet Take 80 mg by mouth daily.     Provider, Historical       ANTHROPOMETRICS:    Ht Readings from Last 1 Encounters:   23 6' 1\" (1.854 m)      Wt Readings from Last 10 Encounters:   23 127.7 kg (281 lb 9.6 oz)   01/11/23 129.3 kg (285 lb)   10/13/22 126.6 kg (279 lb 1.6 oz)        BMI: 37.15 kg/M2 Category: obese (class I)  Waist (measured at intake):  inches    Reported Wt Hx: able to maintain weight while working, but gained from ~250# to 300# after retiring about 3 years ago. Pt has cut back on portions since not being as active and lost 20# to current weight of 281#. Reported Diet Hx: NKFA    Rate Your Plate Score: 46  (Score 58-72: Making many healthy choices; 41-57: Some choices need improving 24-40: many choices need improving)    24 HOUR DIET RECALL  Breakfast Oatmeal (flavored packet), muffin   Lunch    Dinner    Snacks    Beverages Club soda, unsweet tea      Brigitte Basilio worked in [employer] in [place] most of his life, always on the go and eating whatever he could to make it through the day. Generally fast food or grab & go meal; something unhealthy. Pt now retired, but unwilling to fully give up old habits. Pt seems resistant to change and expressed that making positive dietary changes will take the fun out of eating. Pt enjoys some vegetables and eats either broccoli, carrots, or a salad with most dinners during the week. Discussed adding protein to meals to improve blood sugar management and energy levels. Also suggested adding fruit or vegetable to most meals/snacks. Environmental/Social: pt does majority of the cooking    Estimated Energy Needs: 4387 (using [address] with AF 1.2)    NUTRITION INTERVENTION:  Nutrition 60 minute one-on-one education & goal setting with Brigitte Basilio    Reviewed with Brigitte Basilio relevant labs compared to ideals. Reviewed weight history and patient's verbalized weight goal as well as any real or perceived barriers to obtaining the goal. Collaborated with patient to set a specific short and long term weight goal.     Reviewed Rate Your Plate and conducted a verbal diet recall.   Assessed for environmental, financial, psychosocial, physical and comorbidities that may impact the food and eating patterns / behaviors of Kelly Burgose with patient to set specific nutrient goals as well as specific food / behavior changes that will help patient meet the overall goal of following a heart healthy eating pattern (using guidelines as set forth by the American Heart Association and modeled after healthful eating patterns as recognized by the USDA Dietary Guidelines such as DASH, Mediterranean or plant-based). Briefly reviewed with Corina Dave the nutrition information in the Cardiac Rehab patient education book and encouraged Corina Dave to read thoroughly, ask questions as needed, and use for future reference for heart healthy nutrition information. Corina Dave is not scheduled to participate in Cardiac Rehab group nutrition classes. PATIENT GOALS:    Weight Goals:  Short Term Weight Goal:268 lbs  Long Term Weight Goal:250 lbs    Nutrition Goals:  Daily Recommendations:  Calories: 2100 /day  (for weight loss)  Saturated Fat: no more than 12 g/day  Trans Fat: 0 g/day  Sodium: no more than 2217-6848 mg/day  Fruit: 2 cups / day  Vegetables: 2-3 cups/day    Other:  - read and compare food labels  - add protein to breakfast in the form of 1-2 eggs/egg whites or handful of nuts in oatmeal  - add fruit or vegetable to most meals/snacks      Keeping a food diary was recommended. Questions addressed. Follow-up plans discussed. Corina Dave verbalized understanding.             Marimar Omalley RD

## 2023-01-27 ENCOUNTER — HOSPITAL ENCOUNTER (OUTPATIENT)
Dept: CARDIAC REHAB | Age: 68
Discharge: HOME | End: 2023-01-27
Payer: COMMERCIAL

## 2023-01-27 VITALS — WEIGHT: 280 LBS | BODY MASS INDEX: 36.94 KG/M2

## 2023-01-27 PROCEDURE — 93798 PHYS/QHP OP CAR RHAB W/ECG: CPT

## 2023-02-01 ENCOUNTER — HOSPITAL ENCOUNTER (OUTPATIENT)
Dept: CARDIAC REHAB | Age: 68
Discharge: HOME | End: 2023-02-01
Payer: COMMERCIAL

## 2023-02-01 VITALS — BODY MASS INDEX: 36.94 KG/M2 | WEIGHT: 280 LBS

## 2023-02-01 PROCEDURE — 93798 PHYS/QHP OP CAR RHAB W/ECG: CPT

## 2023-02-03 ENCOUNTER — HOSPITAL ENCOUNTER (OUTPATIENT)
Dept: CARDIAC REHAB | Age: 68
Discharge: HOME | End: 2023-02-03
Payer: COMMERCIAL

## 2023-02-03 VITALS — BODY MASS INDEX: 37.15 KG/M2 | WEIGHT: 281.6 LBS

## 2023-02-03 PROCEDURE — 93798 PHYS/QHP OP CAR RHAB W/ECG: CPT

## 2023-02-08 ENCOUNTER — HOSPITAL ENCOUNTER (OUTPATIENT)
Dept: CARDIAC REHAB | Age: 68
Discharge: HOME | End: 2023-02-08
Payer: COMMERCIAL

## 2023-02-08 VITALS — WEIGHT: 277.4 LBS | BODY MASS INDEX: 36.6 KG/M2

## 2023-02-08 PROCEDURE — 93798 PHYS/QHP OP CAR RHAB W/ECG: CPT

## 2023-02-10 ENCOUNTER — HOSPITAL ENCOUNTER (OUTPATIENT)
Dept: CARDIAC REHAB | Age: 68
Discharge: HOME | End: 2023-02-10
Payer: COMMERCIAL

## 2023-02-10 VITALS — BODY MASS INDEX: 36.59 KG/M2 | WEIGHT: 277.3 LBS

## 2023-02-10 PROCEDURE — 93798 PHYS/QHP OP CAR RHAB W/ECG: CPT

## 2023-02-11 DIAGNOSIS — Z95.5 STENTED CORONARY ARTERY: ICD-10-CM

## 2023-02-11 DIAGNOSIS — Z98.61 POSTSURGICAL PERCUTANEOUS TRANSLUMINAL CORONARY ANGIOPLASTY STATUS: ICD-10-CM

## 2023-02-15 ENCOUNTER — HOSPITAL ENCOUNTER (OUTPATIENT)
Dept: CARDIAC REHAB | Age: 68
Discharge: HOME | End: 2023-02-15
Payer: COMMERCIAL

## 2023-02-15 VITALS — BODY MASS INDEX: 36.94 KG/M2 | WEIGHT: 280 LBS

## 2023-02-15 PROCEDURE — 93798 PHYS/QHP OP CAR RHAB W/ECG: CPT

## 2023-02-17 ENCOUNTER — HOSPITAL ENCOUNTER (OUTPATIENT)
Dept: CARDIAC REHAB | Age: 68
Discharge: HOME | End: 2023-02-17
Payer: COMMERCIAL

## 2023-02-17 VITALS — BODY MASS INDEX: 36.94 KG/M2 | WEIGHT: 280 LBS

## 2023-02-17 PROCEDURE — 93798 PHYS/QHP OP CAR RHAB W/ECG: CPT

## 2023-02-22 ENCOUNTER — HOSPITAL ENCOUNTER (OUTPATIENT)
Dept: CARDIAC REHAB | Age: 68
Discharge: HOME | End: 2023-02-22
Payer: COMMERCIAL

## 2023-02-22 VITALS — WEIGHT: 279 LBS | BODY MASS INDEX: 36.81 KG/M2

## 2023-02-22 PROCEDURE — 93798 PHYS/QHP OP CAR RHAB W/ECG: CPT

## 2023-02-24 ENCOUNTER — APPOINTMENT (OUTPATIENT)
Dept: CARDIAC REHAB | Age: 68
End: 2023-02-24
Payer: COMMERCIAL

## 2023-03-01 ENCOUNTER — HOSPITAL ENCOUNTER (OUTPATIENT)
Dept: CARDIAC REHAB | Age: 68
Discharge: HOME | End: 2023-03-01
Payer: COMMERCIAL

## 2023-03-01 VITALS — WEIGHT: 280 LBS | BODY MASS INDEX: 36.94 KG/M2

## 2023-03-01 PROCEDURE — 93798 PHYS/QHP OP CAR RHAB W/ECG: CPT

## 2023-03-03 ENCOUNTER — HOSPITAL ENCOUNTER (OUTPATIENT)
Dept: CARDIAC REHAB | Age: 68
Discharge: HOME | End: 2023-03-03
Payer: COMMERCIAL

## 2023-03-03 VITALS — BODY MASS INDEX: 36.68 KG/M2 | WEIGHT: 278 LBS

## 2023-03-03 PROCEDURE — 93798 PHYS/QHP OP CAR RHAB W/ECG: CPT

## 2023-03-08 ENCOUNTER — HOSPITAL ENCOUNTER (OUTPATIENT)
Dept: CARDIAC REHAB | Age: 68
Discharge: HOME | End: 2023-03-08
Payer: COMMERCIAL

## 2023-03-08 VITALS — WEIGHT: 279 LBS | BODY MASS INDEX: 36.81 KG/M2

## 2023-03-08 PROCEDURE — 93798 PHYS/QHP OP CAR RHAB W/ECG: CPT

## 2023-03-10 ENCOUNTER — HOSPITAL ENCOUNTER (OUTPATIENT)
Dept: CARDIAC REHAB | Age: 68
Discharge: HOME | End: 2023-03-10
Payer: COMMERCIAL

## 2023-03-10 VITALS — BODY MASS INDEX: 36.94 KG/M2 | WEIGHT: 280 LBS

## 2023-03-10 PROCEDURE — 93798 PHYS/QHP OP CAR RHAB W/ECG: CPT

## 2023-03-15 ENCOUNTER — HOSPITAL ENCOUNTER (OUTPATIENT)
Dept: CARDIAC REHAB | Age: 68
Discharge: HOME | End: 2023-03-15
Payer: COMMERCIAL

## 2023-03-15 VITALS — WEIGHT: 279 LBS | BODY MASS INDEX: 36.81 KG/M2

## 2023-03-15 PROCEDURE — 93798 PHYS/QHP OP CAR RHAB W/ECG: CPT

## 2023-03-17 ENCOUNTER — APPOINTMENT (OUTPATIENT)
Dept: CARDIAC REHAB | Age: 68
End: 2023-03-17
Payer: COMMERCIAL

## 2023-03-22 ENCOUNTER — HOSPITAL ENCOUNTER (OUTPATIENT)
Dept: CARDIAC REHAB | Age: 68
Discharge: HOME | End: 2023-03-22
Payer: COMMERCIAL

## 2023-03-22 VITALS — WEIGHT: 277 LBS | BODY MASS INDEX: 36.55 KG/M2

## 2023-03-22 PROCEDURE — 93798 PHYS/QHP OP CAR RHAB W/ECG: CPT

## 2023-03-24 ENCOUNTER — HOSPITAL ENCOUNTER (OUTPATIENT)
Dept: CARDIAC REHAB | Age: 68
Discharge: HOME | End: 2023-03-24
Payer: COMMERCIAL

## 2023-03-24 VITALS — BODY MASS INDEX: 36.15 KG/M2 | WEIGHT: 274 LBS

## 2023-03-24 PROCEDURE — 93798 PHYS/QHP OP CAR RHAB W/ECG: CPT

## 2023-03-24 NOTE — CARDIO/PULMONARY
3/24/23 - this note sent via provider message in [facility] to Dr. Gail Chandler today. Good Morning,    Amaya Bravo is a current [facility] cardiac rehab patient. He told us he recently saw you for an office visit. We wanted you to be aware of his persistent HTN. Mitzi Darden said he is taking all of his medication as prescribed. His resting BP for his past 10 rehab visits has been 910-612 systolic, 24-44 diastolic. HR 50-85, SB/SR. He has no complaints, no pain, no SOB and no plans to follow up regarding his HTN as he states, \"the doctor said I was fine\". We have encouraged him to check it at home and continue to work with his physician to attain resting BP below 130/80. This is not an emergency requiring a phone call, just an [address] [address] [place].      Thank you,  Phyllis Garcia RN

## 2023-03-29 ENCOUNTER — HOSPITAL ENCOUNTER (OUTPATIENT)
Dept: CARDIAC REHAB | Age: 68
Discharge: HOME | End: 2023-03-29
Payer: COMMERCIAL

## 2023-03-29 VITALS — BODY MASS INDEX: 36.7 KG/M2 | WEIGHT: 278.2 LBS

## 2023-03-29 PROCEDURE — 93798 PHYS/QHP OP CAR RHAB W/ECG: CPT

## 2023-03-31 ENCOUNTER — HOSPITAL ENCOUNTER (OUTPATIENT)
Dept: CARDIAC REHAB | Age: 68
End: 2023-03-31
Payer: COMMERCIAL

## 2023-03-31 VITALS — WEIGHT: 278 LBS | BODY MASS INDEX: 36.68 KG/M2

## 2023-03-31 PROCEDURE — 93798 PHYS/QHP OP CAR RHAB W/ECG: CPT

## 2023-04-05 ENCOUNTER — HOSPITAL ENCOUNTER (OUTPATIENT)
Dept: CARDIAC REHAB | Age: 68
End: 2023-04-05
Payer: COMMERCIAL

## 2023-04-05 PROCEDURE — 93798 PHYS/QHP OP CAR RHAB W/ECG: CPT

## 2023-04-07 ENCOUNTER — APPOINTMENT (OUTPATIENT)
Dept: CARDIAC REHAB | Age: 68
End: 2023-04-07
Payer: COMMERCIAL

## 2023-04-14 ENCOUNTER — HOSPITAL ENCOUNTER (OUTPATIENT)
Dept: CARDIAC REHAB | Age: 68
Discharge: HOME | End: 2023-04-14
Payer: COMMERCIAL

## 2023-04-14 VITALS — BODY MASS INDEX: 36.55 KG/M2 | WEIGHT: 277 LBS

## 2023-04-14 PROCEDURE — 93798 PHYS/QHP OP CAR RHAB W/ECG: CPT

## 2023-04-19 ENCOUNTER — HOSPITAL ENCOUNTER (OUTPATIENT)
Dept: CARDIAC REHAB | Age: 68
Discharge: HOME | End: 2023-04-19
Payer: COMMERCIAL

## 2023-04-19 VITALS — WEIGHT: 276 LBS | BODY MASS INDEX: 36.41 KG/M2

## 2023-04-19 PROCEDURE — 93798 PHYS/QHP OP CAR RHAB W/ECG: CPT

## 2023-04-21 ENCOUNTER — HOSPITAL ENCOUNTER (OUTPATIENT)
Dept: CARDIAC REHAB | Age: 68
Discharge: HOME | End: 2023-04-21
Payer: COMMERCIAL

## 2023-04-21 VITALS — BODY MASS INDEX: 36.41 KG/M2 | WEIGHT: 276 LBS

## 2023-04-21 PROCEDURE — 93798 PHYS/QHP OP CAR RHAB W/ECG: CPT

## 2023-04-28 ENCOUNTER — APPOINTMENT (OUTPATIENT)
Dept: CARDIAC REHAB | Age: 68
End: 2023-04-28
Payer: COMMERCIAL

## 2023-05-01 ENCOUNTER — APPOINTMENT (OUTPATIENT)
Facility: HOSPITAL | Age: 68
End: 2023-05-01
Payer: COMMERCIAL

## 2023-05-03 ENCOUNTER — HOSPITAL ENCOUNTER (OUTPATIENT)
Dept: CARDIAC REHAB | Age: 68
Discharge: HOME | End: 2023-05-03
Payer: COMMERCIAL

## 2023-05-03 VITALS — BODY MASS INDEX: 36.55 KG/M2 | WEIGHT: 277 LBS

## 2023-05-03 PROCEDURE — 9990 CHARGE CONVERSION

## 2023-05-03 PROCEDURE — 93798 PHYS/QHP OP CAR RHAB W/ECG: CPT

## 2023-05-05 ENCOUNTER — HOSPITAL ENCOUNTER (OUTPATIENT)
Dept: CARDIAC REHAB | Age: 68
Discharge: HOME | End: 2023-05-05
Payer: COMMERCIAL

## 2023-05-05 VITALS — WEIGHT: 274 LBS | BODY MASS INDEX: 36.15 KG/M2

## 2023-05-05 PROCEDURE — 9990 CHARGE CONVERSION

## 2023-05-05 PROCEDURE — 93798 PHYS/QHP OP CAR RHAB W/ECG: CPT

## 2023-05-10 ENCOUNTER — APPOINTMENT (OUTPATIENT)
Dept: CARDIAC REHAB | Age: 68
End: 2023-05-10
Payer: COMMERCIAL

## 2023-05-10 ENCOUNTER — HOSPITAL ENCOUNTER (OUTPATIENT)
Facility: HOSPITAL | Age: 68
Setting detail: RECURRING SERIES
Discharge: HOME | End: 2023-05-13
Payer: COMMERCIAL

## 2023-05-10 VITALS — BODY MASS INDEX: 36.52 KG/M2 | WEIGHT: 276.8 LBS

## 2023-05-10 PROCEDURE — 93798 PHYS/QHP OP CAR RHAB W/ECG: CPT

## 2023-05-10 ASSESSMENT — EXERCISE STRESS TEST
PEAK_METS: 3.1
PEAK_RPE: 12

## 2023-05-12 ENCOUNTER — APPOINTMENT (OUTPATIENT)
Dept: CARDIAC REHAB | Age: 68
End: 2023-05-12
Payer: COMMERCIAL

## 2023-05-12 ENCOUNTER — HOSPITAL ENCOUNTER (OUTPATIENT)
Facility: HOSPITAL | Age: 68
Setting detail: RECURRING SERIES
Discharge: HOME | End: 2023-05-15
Payer: COMMERCIAL

## 2023-05-12 VITALS — BODY MASS INDEX: 36.47 KG/M2 | WEIGHT: 276.4 LBS

## 2023-05-12 PROCEDURE — 93798 PHYS/QHP OP CAR RHAB W/ECG: CPT

## 2023-05-12 ASSESSMENT — EXERCISE STRESS TEST
PEAK_RPE: 12
PEAK_METS: 3.1

## 2023-05-17 ENCOUNTER — APPOINTMENT (OUTPATIENT)
Dept: CARDIAC REHAB | Age: 68
End: 2023-05-17
Payer: COMMERCIAL

## 2023-05-17 ENCOUNTER — APPOINTMENT (OUTPATIENT)
Facility: HOSPITAL | Age: 68
End: 2023-05-17
Payer: COMMERCIAL

## 2023-05-19 ENCOUNTER — APPOINTMENT (OUTPATIENT)
Facility: HOSPITAL | Age: 68
End: 2023-05-19
Payer: COMMERCIAL

## 2023-05-19 ENCOUNTER — APPOINTMENT (OUTPATIENT)
Dept: CARDIAC REHAB | Age: 68
End: 2023-05-19
Payer: COMMERCIAL

## 2023-05-24 ENCOUNTER — APPOINTMENT (OUTPATIENT)
Facility: HOSPITAL | Age: 68
End: 2023-05-24
Payer: COMMERCIAL

## 2023-05-24 ENCOUNTER — APPOINTMENT (OUTPATIENT)
Dept: CARDIAC REHAB | Age: 68
End: 2023-05-24
Payer: COMMERCIAL

## 2023-05-26 ENCOUNTER — HOSPITAL ENCOUNTER (OUTPATIENT)
Facility: HOSPITAL | Age: 68
Setting detail: RECURRING SERIES
End: 2023-05-26
Payer: COMMERCIAL

## 2023-05-26 ENCOUNTER — APPOINTMENT (OUTPATIENT)
Dept: CARDIAC REHAB | Age: 68
End: 2023-05-26
Payer: COMMERCIAL

## 2023-05-26 ASSESSMENT — LIFESTYLE VARIABLES: SMOKELESS_TOBACCO: NO

## 2023-05-26 ASSESSMENT — EJECTION FRACTION: EF_VALUE: 42

## 2023-05-31 ENCOUNTER — HOSPITAL ENCOUNTER (OUTPATIENT)
Facility: HOSPITAL | Age: 68
Setting detail: RECURRING SERIES
Discharge: HOME | End: 2023-06-03
Payer: COMMERCIAL

## 2023-05-31 VITALS — BODY MASS INDEX: 36.15 KG/M2 | WEIGHT: 274 LBS

## 2023-05-31 PROCEDURE — 93798 PHYS/QHP OP CAR RHAB W/ECG: CPT

## 2023-05-31 RX ORDER — RANOLAZINE 500 MG/1
500 TABLET, EXTENDED RELEASE ORAL DAILY
COMMUNITY

## 2023-05-31 ASSESSMENT — EXERCISE STRESS TEST
PEAK_RPE: 12
PEAK_METS: 3.1

## 2023-06-02 ENCOUNTER — HOSPITAL ENCOUNTER (OUTPATIENT)
Facility: HOSPITAL | Age: 68
Setting detail: RECURRING SERIES
Discharge: HOME | End: 2023-06-05
Payer: COMMERCIAL

## 2023-06-02 VITALS — BODY MASS INDEX: 36.28 KG/M2 | WEIGHT: 275 LBS

## 2023-06-02 PROCEDURE — 93798 PHYS/QHP OP CAR RHAB W/ECG: CPT

## 2023-06-02 ASSESSMENT — EXERCISE STRESS TEST
PEAK_RPE: 12
PEAK_METS: 3.1

## 2023-06-05 ASSESSMENT — PATIENT HEALTH QUESTIONNAIRE - PHQ9
SUM OF ALL RESPONSES TO PHQ QUESTIONS 1-9: 7
SUM OF ALL RESPONSES TO PHQ9 QUESTIONS 1 & 2: 0
9. THOUGHTS THAT YOU WOULD BE BETTER OFF DEAD, OR OF HURTING YOURSELF: 0
3. TROUBLE FALLING OR STAYING ASLEEP: 2
SUM OF ALL RESPONSES TO PHQ QUESTIONS 1-9: 7
SUM OF ALL RESPONSES TO PHQ QUESTIONS 1-9: 7
10. IF YOU CHECKED OFF ANY PROBLEMS, HOW DIFFICULT HAVE THESE PROBLEMS MADE IT FOR YOU TO DO YOUR WORK, TAKE CARE OF THINGS AT HOME, OR GET ALONG WITH OTHER PEOPLE: 1
1. LITTLE INTEREST OR PLEASURE IN DOING THINGS: 0
2. FEELING DOWN, DEPRESSED OR HOPELESS: 0
8. MOVING OR SPEAKING SO SLOWLY THAT OTHER PEOPLE COULD HAVE NOTICED. OR THE OPPOSITE, BEING SO FIGETY OR RESTLESS THAT YOU HAVE BEEN MOVING AROUND A LOT MORE THAN USUAL: 1
7. TROUBLE CONCENTRATING ON THINGS, SUCH AS READING THE NEWSPAPER OR WATCHING TELEVISION: 1
4. FEELING TIRED OR HAVING LITTLE ENERGY: 1
6. FEELING BAD ABOUT YOURSELF - OR THAT YOU ARE A FAILURE OR HAVE LET YOURSELF OR YOUR FAMILY DOWN: 1
SUM OF ALL RESPONSES TO PHQ QUESTIONS 1-9: 7
5. POOR APPETITE OR OVEREATING: 1

## 2023-06-07 ENCOUNTER — HOSPITAL ENCOUNTER (OUTPATIENT)
Facility: HOSPITAL | Age: 68
Setting detail: RECURRING SERIES
Discharge: HOME | End: 2023-06-10
Payer: COMMERCIAL

## 2023-06-07 VITALS — BODY MASS INDEX: 36.15 KG/M2 | WEIGHT: 274 LBS

## 2023-06-07 PROCEDURE — 93798 PHYS/QHP OP CAR RHAB W/ECG: CPT

## 2023-06-07 ASSESSMENT — EXERCISE STRESS TEST
PEAK_METS: 3.1
PEAK_RPE: 12

## 2023-06-16 ENCOUNTER — HOSPITAL ENCOUNTER (OUTPATIENT)
Facility: HOSPITAL | Age: 68
Setting detail: RECURRING SERIES
Discharge: HOME | End: 2023-06-19
Payer: COMMERCIAL

## 2023-06-16 VITALS — BODY MASS INDEX: 36.28 KG/M2 | WEIGHT: 275 LBS

## 2023-06-16 PROCEDURE — 93798 PHYS/QHP OP CAR RHAB W/ECG: CPT

## 2023-06-16 ASSESSMENT — LIFESTYLE VARIABLES: SMOKELESS_TOBACCO: NO

## 2023-06-16 ASSESSMENT — EXERCISE STRESS TEST
PEAK_METS: 3.1
PEAK_RPE: 12
PEAK_RPE: 12
PEAK_BP: 160/82
PEAK_BP: 160/82
PEAK_HR: 94
PEAK_BP: 160/82
PEAK_HR: 94

## 2023-06-16 ASSESSMENT — EJECTION FRACTION: EF_VALUE: 42

## 2023-06-28 ENCOUNTER — HOSPITAL ENCOUNTER (OUTPATIENT)
Facility: HOSPITAL | Age: 68
Setting detail: RECURRING SERIES
Discharge: HOME | End: 2023-07-01

## 2023-06-28 VITALS — WEIGHT: 274 LBS | BODY MASS INDEX: 36.15 KG/M2

## 2023-06-28 PROCEDURE — 9900000112 HC DAILY CARDIAC MAINTENANCE (RETAIL)

## 2023-06-28 ASSESSMENT — EXERCISE STRESS TEST
PEAK_METS: 3.1
PEAK_RPE: 12

## 2023-07-05 ENCOUNTER — HOSPITAL ENCOUNTER (OUTPATIENT)
Facility: HOSPITAL | Age: 68
Setting detail: RECURRING SERIES
Discharge: HOME | End: 2023-07-08
Payer: SELF-PAY

## 2023-07-05 VITALS — BODY MASS INDEX: 36.15 KG/M2 | WEIGHT: 274 LBS

## 2023-07-05 PROCEDURE — 9900000112 HC DAILY CARDIAC MAINTENANCE (RETAIL)

## 2023-07-05 ASSESSMENT — EXERCISE STRESS TEST
PEAK_RPE: 12
PEAK_METS: 3.1

## 2023-07-12 ENCOUNTER — HOSPITAL ENCOUNTER (OUTPATIENT)
Facility: HOSPITAL | Age: 68
Setting detail: RECURRING SERIES
Discharge: HOME | End: 2023-07-15

## 2023-07-12 VITALS — BODY MASS INDEX: 36.02 KG/M2 | WEIGHT: 273 LBS

## 2023-07-12 PROCEDURE — 9900000112 HC DAILY CARDIAC MAINTENANCE (RETAIL)

## 2023-07-12 ASSESSMENT — EXERCISE STRESS TEST
PEAK_METS: 3.1
PEAK_RPE: 12

## 2023-07-19 ENCOUNTER — HOSPITAL ENCOUNTER (OUTPATIENT)
Facility: HOSPITAL | Age: 68
Setting detail: RECURRING SERIES
Discharge: HOME | End: 2023-07-22

## 2023-07-19 VITALS — BODY MASS INDEX: 35.89 KG/M2 | WEIGHT: 272 LBS

## 2023-07-19 PROCEDURE — 93798 PHYS/QHP OP CAR RHAB W/ECG: CPT

## 2023-07-19 PROCEDURE — 9900000112 HC DAILY CARDIAC MAINTENANCE (RETAIL)

## 2023-07-19 ASSESSMENT — EXERCISE STRESS TEST
PEAK_METS: 3.1
PEAK_RPE: 12

## 2023-07-21 ENCOUNTER — HOSPITAL ENCOUNTER (OUTPATIENT)
Facility: HOSPITAL | Age: 68
Setting detail: RECURRING SERIES
Discharge: HOME | End: 2023-07-24

## 2023-07-21 VITALS — WEIGHT: 272 LBS | BODY MASS INDEX: 35.89 KG/M2

## 2023-07-21 PROCEDURE — 9900000112 HC DAILY CARDIAC MAINTENANCE (RETAIL)

## 2023-07-21 ASSESSMENT — EXERCISE STRESS TEST
PEAK_RPE: 12
PEAK_METS: 3.1

## 2023-07-26 ENCOUNTER — HOSPITAL ENCOUNTER (OUTPATIENT)
Facility: HOSPITAL | Age: 68
Setting detail: RECURRING SERIES
Discharge: HOME | End: 2023-07-29

## 2023-07-26 VITALS — BODY MASS INDEX: 35.75 KG/M2 | WEIGHT: 271 LBS

## 2023-07-26 PROCEDURE — 9900000112 HC DAILY CARDIAC MAINTENANCE (RETAIL)

## 2023-07-26 ASSESSMENT — EXERCISE STRESS TEST
PEAK_METS: 3.1
PEAK_RPE: 12

## 2023-07-28 ENCOUNTER — APPOINTMENT (OUTPATIENT)
Facility: HOSPITAL | Age: 68
End: 2023-07-28

## 2023-08-02 ENCOUNTER — HOSPITAL ENCOUNTER (OUTPATIENT)
Facility: HOSPITAL | Age: 68
Discharge: HOME | End: 2023-08-05

## 2023-08-02 VITALS — WEIGHT: 274 LBS | BODY MASS INDEX: 36.15 KG/M2

## 2023-08-02 PROCEDURE — 9900000112 HC DAILY CARDIAC MAINTENANCE (RETAIL)

## 2023-08-02 ASSESSMENT — EXERCISE STRESS TEST
PEAK_RPE: 12
PEAK_METS: 3.1

## 2023-08-04 ENCOUNTER — HOSPITAL ENCOUNTER (OUTPATIENT)
Facility: HOSPITAL | Age: 68
Setting detail: RECURRING SERIES
Discharge: HOME | End: 2023-08-07

## 2023-08-04 VITALS — WEIGHT: 274 LBS | BODY MASS INDEX: 36.15 KG/M2

## 2023-08-04 PROCEDURE — 9900000112 HC DAILY CARDIAC MAINTENANCE (RETAIL)

## 2023-08-04 ASSESSMENT — EXERCISE STRESS TEST
PEAK_METS: 3.1
PEAK_RPE: 12

## 2023-08-09 ENCOUNTER — HOSPITAL ENCOUNTER (OUTPATIENT)
Facility: HOSPITAL | Age: 68
Setting detail: RECURRING SERIES
Discharge: HOME | End: 2023-08-12

## 2023-08-09 VITALS — WEIGHT: 274 LBS | BODY MASS INDEX: 36.15 KG/M2

## 2023-08-09 PROCEDURE — 9900000112 HC DAILY CARDIAC MAINTENANCE (RETAIL)

## 2023-08-09 ASSESSMENT — EXERCISE STRESS TEST
PEAK_METS: 3.1
PEAK_RPE: 12

## 2023-08-12 ENCOUNTER — APPOINTMENT (OUTPATIENT)
Facility: HOSPITAL | Age: 68
DRG: 313 | End: 2023-08-12
Payer: MEDICARE

## 2023-08-12 ENCOUNTER — HOSPITAL ENCOUNTER (INPATIENT)
Facility: HOSPITAL | Age: 68
LOS: 2 days | Discharge: HOSPICE | DRG: 313 | End: 2023-08-14
Attending: EMERGENCY MEDICINE | Admitting: FAMILY MEDICINE
Payer: MEDICARE

## 2023-08-12 DIAGNOSIS — R07.9 CHEST PAIN, UNSPECIFIED TYPE: Primary | ICD-10-CM

## 2023-08-12 DIAGNOSIS — I25.118 CORONARY ARTERY DISEASE OF NATIVE ARTERY OF NATIVE HEART WITH STABLE ANGINA PECTORIS (HCC): ICD-10-CM

## 2023-08-12 LAB
ALBUMIN SERPL-MCNC: 3.7 G/DL (ref 3.4–5)
ALBUMIN/GLOB SERPL: 1.1 (ref 0.8–1.7)
ALP SERPL-CCNC: 123 U/L (ref 45–117)
ALT SERPL-CCNC: 25 U/L (ref 16–61)
ANION GAP SERPL CALC-SCNC: 7 MMOL/L (ref 3–18)
AST SERPL-CCNC: 8 U/L (ref 10–38)
BASOPHILS # BLD: 0.1 K/UL (ref 0–0.1)
BASOPHILS NFR BLD: 1 % (ref 0–2)
BILIRUB SERPL-MCNC: 0.7 MG/DL (ref 0.2–1)
BUN SERPL-MCNC: 17 MG/DL (ref 7–18)
BUN/CREAT SERPL: 14 (ref 12–20)
CALCIUM SERPL-MCNC: 8.8 MG/DL (ref 8.5–10.1)
CHLORIDE SERPL-SCNC: 108 MMOL/L (ref 100–111)
CO2 SERPL-SCNC: 26 MMOL/L (ref 21–32)
CREAT SERPL-MCNC: 1.19 MG/DL (ref 0.6–1.3)
DIFFERENTIAL METHOD BLD: ABNORMAL
EOSINOPHIL # BLD: 0.1 K/UL (ref 0–0.4)
EOSINOPHIL NFR BLD: 1 % (ref 0–5)
ERYTHROCYTE [DISTWIDTH] IN BLOOD BY AUTOMATED COUNT: 14.5 % (ref 11.6–14.5)
GLOBULIN SER CALC-MCNC: 3.4 G/DL (ref 2–4)
GLUCOSE BLD STRIP.AUTO-MCNC: 104 MG/DL (ref 70–110)
GLUCOSE SERPL-MCNC: 151 MG/DL (ref 74–99)
HCT VFR BLD AUTO: 40.8 % (ref 36–48)
HGB BLD-MCNC: 13.6 G/DL (ref 13–16)
IMM GRANULOCYTES # BLD AUTO: 0 K/UL (ref 0–0.04)
IMM GRANULOCYTES NFR BLD AUTO: 0 % (ref 0–0.5)
LYMPHOCYTES # BLD: 1.2 K/UL (ref 0.9–3.6)
LYMPHOCYTES NFR BLD: 14 % (ref 21–52)
MAGNESIUM SERPL-MCNC: 2 MG/DL (ref 1.6–2.6)
MCH RBC QN AUTO: 28.8 PG (ref 24–34)
MCHC RBC AUTO-ENTMCNC: 33.3 G/DL (ref 31–37)
MCV RBC AUTO: 86.3 FL (ref 78–100)
MONOCYTES # BLD: 0.6 K/UL (ref 0.05–1.2)
MONOCYTES NFR BLD: 8 % (ref 3–10)
NEUTS SEG # BLD: 6.4 K/UL (ref 1.8–8)
NEUTS SEG NFR BLD: 77 % (ref 40–73)
NRBC # BLD: 0 K/UL (ref 0–0.01)
NRBC BLD-RTO: 0 PER 100 WBC
PLATELET # BLD AUTO: 284 K/UL (ref 135–420)
PMV BLD AUTO: 9.9 FL (ref 9.2–11.8)
POTASSIUM SERPL-SCNC: 3.4 MMOL/L (ref 3.5–5.5)
PROT SERPL-MCNC: 7.1 G/DL (ref 6.4–8.2)
RBC # BLD AUTO: 4.73 M/UL (ref 4.35–5.65)
SODIUM SERPL-SCNC: 141 MMOL/L (ref 136–145)
TROPONIN I SERPL HS-MCNC: 37 NG/L (ref 0–78)
TROPONIN I SERPL HS-MCNC: 39 NG/L (ref 0–78)
TROPONIN I SERPL HS-MCNC: 41 NG/L (ref 0–78)
WBC # BLD AUTO: 8.4 K/UL (ref 4.6–13.2)

## 2023-08-12 PROCEDURE — 71045 X-RAY EXAM CHEST 1 VIEW: CPT

## 2023-08-12 PROCEDURE — 93005 ELECTROCARDIOGRAM TRACING: CPT | Performed by: EMERGENCY MEDICINE

## 2023-08-12 PROCEDURE — 6370000000 HC RX 637 (ALT 250 FOR IP): Performed by: FAMILY MEDICINE

## 2023-08-12 PROCEDURE — 1100000003 HC PRIVATE W/ TELEMETRY

## 2023-08-12 PROCEDURE — 83735 ASSAY OF MAGNESIUM: CPT

## 2023-08-12 PROCEDURE — 82962 GLUCOSE BLOOD TEST: CPT

## 2023-08-12 PROCEDURE — 85025 COMPLETE CBC W/AUTO DIFF WBC: CPT

## 2023-08-12 PROCEDURE — 99285 EMERGENCY DEPT VISIT HI MDM: CPT

## 2023-08-12 PROCEDURE — 2580000003 HC RX 258: Performed by: FAMILY MEDICINE

## 2023-08-12 PROCEDURE — 84484 ASSAY OF TROPONIN QUANT: CPT

## 2023-08-12 PROCEDURE — 6370000000 HC RX 637 (ALT 250 FOR IP): Performed by: EMERGENCY MEDICINE

## 2023-08-12 PROCEDURE — 94761 N-INVAS EAR/PLS OXIMETRY MLT: CPT

## 2023-08-12 PROCEDURE — 80053 COMPREHEN METABOLIC PANEL: CPT

## 2023-08-12 RX ORDER — EZETIMIBE 10 MG/1
10 TABLET ORAL DAILY
Status: DISCONTINUED | OUTPATIENT
Start: 2023-08-13 | End: 2023-08-14 | Stop reason: HOSPADM

## 2023-08-12 RX ORDER — POLYETHYLENE GLYCOL 3350 17 G/17G
17 POWDER, FOR SOLUTION ORAL DAILY PRN
Status: DISCONTINUED | OUTPATIENT
Start: 2023-08-12 | End: 2023-08-14 | Stop reason: HOSPADM

## 2023-08-12 RX ORDER — ATORVASTATIN CALCIUM 20 MG/1
80 TABLET, FILM COATED ORAL DAILY
Status: DISCONTINUED | OUTPATIENT
Start: 2023-08-13 | End: 2023-08-14 | Stop reason: HOSPADM

## 2023-08-12 RX ORDER — NITROGLYCERIN 0.4 MG/1
0.4 TABLET SUBLINGUAL EVERY 5 MIN PRN
Status: DISCONTINUED | OUTPATIENT
Start: 2023-08-12 | End: 2023-08-13

## 2023-08-12 RX ORDER — ASPIRIN 81 MG/1
81 TABLET, CHEWABLE ORAL DAILY
Status: DISCONTINUED | OUTPATIENT
Start: 2023-08-13 | End: 2023-08-14 | Stop reason: HOSPADM

## 2023-08-12 RX ORDER — METOPROLOL TARTRATE 50 MG/1
50 TABLET, FILM COATED ORAL ONCE
Status: COMPLETED | OUTPATIENT
Start: 2023-08-12 | End: 2023-08-12

## 2023-08-12 RX ORDER — ONDANSETRON 2 MG/ML
4 INJECTION INTRAMUSCULAR; INTRAVENOUS EVERY 6 HOURS PRN
Status: DISCONTINUED | OUTPATIENT
Start: 2023-08-12 | End: 2023-08-14 | Stop reason: HOSPADM

## 2023-08-12 RX ORDER — SODIUM CHLORIDE 0.9 % (FLUSH) 0.9 %
5-40 SYRINGE (ML) INJECTION EVERY 12 HOURS SCHEDULED
Status: DISCONTINUED | OUTPATIENT
Start: 2023-08-12 | End: 2023-08-14 | Stop reason: HOSPADM

## 2023-08-12 RX ORDER — ONDANSETRON 4 MG/1
4 TABLET, ORALLY DISINTEGRATING ORAL EVERY 8 HOURS PRN
Status: DISCONTINUED | OUTPATIENT
Start: 2023-08-12 | End: 2023-08-14 | Stop reason: HOSPADM

## 2023-08-12 RX ORDER — ACETAMINOPHEN 650 MG/1
650 SUPPOSITORY RECTAL EVERY 6 HOURS PRN
Status: DISCONTINUED | OUTPATIENT
Start: 2023-08-12 | End: 2023-08-14 | Stop reason: HOSPADM

## 2023-08-12 RX ORDER — SODIUM CHLORIDE 9 MG/ML
INJECTION, SOLUTION INTRAVENOUS PRN
Status: DISCONTINUED | OUTPATIENT
Start: 2023-08-12 | End: 2023-08-14 | Stop reason: HOSPADM

## 2023-08-12 RX ORDER — DILTIAZEM HYDROCHLORIDE 120 MG/1
120 CAPSULE, COATED, EXTENDED RELEASE ORAL DAILY
Status: DISCONTINUED | OUTPATIENT
Start: 2023-08-13 | End: 2023-08-12

## 2023-08-12 RX ORDER — INSULIN LISPRO 100 [IU]/ML
0-4 INJECTION, SOLUTION INTRAVENOUS; SUBCUTANEOUS
Status: DISCONTINUED | OUTPATIENT
Start: 2023-08-12 | End: 2023-08-14 | Stop reason: HOSPADM

## 2023-08-12 RX ORDER — SODIUM CHLORIDE 0.9 % (FLUSH) 0.9 %
5-40 SYRINGE (ML) INJECTION PRN
Status: DISCONTINUED | OUTPATIENT
Start: 2023-08-12 | End: 2023-08-14 | Stop reason: HOSPADM

## 2023-08-12 RX ORDER — METOPROLOL TARTRATE 50 MG/1
50 TABLET, FILM COATED ORAL 2 TIMES DAILY
Status: DISCONTINUED | OUTPATIENT
Start: 2023-08-12 | End: 2023-08-12

## 2023-08-12 RX ORDER — RANOLAZINE 500 MG/1
500 TABLET, EXTENDED RELEASE ORAL 2 TIMES DAILY
Status: DISCONTINUED | OUTPATIENT
Start: 2023-08-12 | End: 2023-08-14 | Stop reason: HOSPADM

## 2023-08-12 RX ORDER — LOSARTAN POTASSIUM 50 MG/1
50 TABLET ORAL DAILY
Status: DISCONTINUED | OUTPATIENT
Start: 2023-08-13 | End: 2023-08-14 | Stop reason: HOSPADM

## 2023-08-12 RX ORDER — METOPROLOL TARTRATE 50 MG/1
100 TABLET, FILM COATED ORAL 2 TIMES DAILY
Status: DISCONTINUED | OUTPATIENT
Start: 2023-08-13 | End: 2023-08-13

## 2023-08-12 RX ORDER — ACETAMINOPHEN 325 MG/1
650 TABLET ORAL EVERY 6 HOURS PRN
Status: DISCONTINUED | OUTPATIENT
Start: 2023-08-12 | End: 2023-08-14 | Stop reason: HOSPADM

## 2023-08-12 RX ORDER — CLOPIDOGREL BISULFATE 75 MG/1
75 TABLET ORAL DAILY
Status: DISCONTINUED | OUTPATIENT
Start: 2023-08-13 | End: 2023-08-14 | Stop reason: HOSPADM

## 2023-08-12 RX ADMIN — SODIUM CHLORIDE, PRESERVATIVE FREE 10 ML: 5 INJECTION INTRAVENOUS at 22:51

## 2023-08-12 RX ADMIN — NITROGLYCERIN 0.4 MG: 0.4 TABLET, ORALLY DISINTEGRATING SUBLINGUAL at 19:18

## 2023-08-12 RX ADMIN — METOPROLOL TARTRATE 50 MG: 50 TABLET ORAL at 22:50

## 2023-08-12 RX ADMIN — APIXABAN 5 MG: 5 TABLET, FILM COATED ORAL at 22:50

## 2023-08-12 RX ADMIN — METOPROLOL TARTRATE 50 MG: 50 TABLET ORAL at 23:38

## 2023-08-12 RX ADMIN — RANOLAZINE 500 MG: 500 TABLET, FILM COATED, EXTENDED RELEASE ORAL at 22:50

## 2023-08-12 NOTE — ED TRIAGE NOTES
Pt ambulated into er with complaints of left sided chest pain that started prior to arrival. Pt states that pain is similar to previous MI. Pt was at [address] [facility] walking around when pain started.

## 2023-08-13 PROBLEM — I10 HTN (HYPERTENSION): Status: ACTIVE | Noted: 2023-08-13

## 2023-08-13 PROBLEM — I25.10 CAD (CORONARY ARTERY DISEASE): Status: ACTIVE | Noted: 2023-08-13

## 2023-08-13 PROBLEM — E11.9 TYPE 2 DIABETES MELLITUS (HCC): Status: ACTIVE | Noted: 2023-08-13

## 2023-08-13 PROBLEM — I48.91 ATRIAL FIBRILLATION (HCC): Status: ACTIVE | Noted: 2023-08-13

## 2023-08-13 PROBLEM — I44.1 MOBITZ TYPE 2 SECOND DEGREE ATRIOVENTRICULAR BLOCK: Status: ACTIVE | Noted: 2023-08-13

## 2023-08-13 PROBLEM — Z79.01 ANTICOAGULATED: Status: ACTIVE | Noted: 2023-08-13

## 2023-08-13 LAB
ALBUMIN SERPL-MCNC: 3.3 G/DL (ref 3.4–5)
ALBUMIN/GLOB SERPL: 1.2 (ref 0.8–1.7)
ALP SERPL-CCNC: 100 U/L (ref 45–117)
ALT SERPL-CCNC: 20 U/L (ref 16–61)
ANION GAP SERPL CALC-SCNC: 7 MMOL/L (ref 3–18)
APTT PPP: 50.1 SEC (ref 23–36.4)
AST SERPL-CCNC: 9 U/L (ref 10–38)
BASOPHILS # BLD: 0 K/UL (ref 0–0.1)
BASOPHILS NFR BLD: 0 % (ref 0–2)
BILIRUB SERPL-MCNC: 0.6 MG/DL (ref 0.2–1)
BUN SERPL-MCNC: 17 MG/DL (ref 7–18)
BUN/CREAT SERPL: 23 (ref 12–20)
CALCIUM SERPL-MCNC: 8.3 MG/DL (ref 8.5–10.1)
CHLORIDE SERPL-SCNC: 110 MMOL/L (ref 100–111)
CO2 SERPL-SCNC: 25 MMOL/L (ref 21–32)
CREAT SERPL-MCNC: 0.75 MG/DL (ref 0.6–1.3)
DIFFERENTIAL METHOD BLD: ABNORMAL
EKG ATRIAL RATE: 111 BPM
EKG ATRIAL RATE: 56 BPM
EKG DIAGNOSIS: NORMAL
EKG DIAGNOSIS: NORMAL
EKG P AXIS: 52 DEGREES
EKG P AXIS: 52 DEGREES
EKG P-R INTERVAL: 182 MS
EKG P-R INTERVAL: 212 MS
EKG Q-T INTERVAL: 390 MS
EKG Q-T INTERVAL: 486 MS
EKG QRS DURATION: 98 MS
EKG QRS DURATION: 98 MS
EKG QTC CALCULATION (BAZETT): 468 MS
EKG QTC CALCULATION (BAZETT): 477 MS
EKG R AXIS: -13 DEGREES
EKG R AXIS: -13 DEGREES
EKG T AXIS: 64 DEGREES
EKG T AXIS: 75 DEGREES
EKG VENTRICULAR RATE: 56 BPM
EKG VENTRICULAR RATE: 90 BPM
EOSINOPHIL # BLD: 0.1 K/UL (ref 0–0.4)
EOSINOPHIL NFR BLD: 1 % (ref 0–5)
ERYTHROCYTE [DISTWIDTH] IN BLOOD BY AUTOMATED COUNT: 14.6 % (ref 11.6–14.5)
ERYTHROCYTE [DISTWIDTH] IN BLOOD BY AUTOMATED COUNT: 14.6 % (ref 11.6–14.5)
GLOBULIN SER CALC-MCNC: 2.7 G/DL (ref 2–4)
GLUCOSE BLD STRIP.AUTO-MCNC: 106 MG/DL (ref 70–110)
GLUCOSE BLD STRIP.AUTO-MCNC: 111 MG/DL (ref 70–110)
GLUCOSE BLD STRIP.AUTO-MCNC: 117 MG/DL (ref 70–110)
GLUCOSE BLD STRIP.AUTO-MCNC: 125 MG/DL (ref 70–110)
GLUCOSE SERPL-MCNC: 102 MG/DL (ref 74–99)
HCT VFR BLD AUTO: 37.7 % (ref 36–48)
HCT VFR BLD AUTO: 37.9 % (ref 36–48)
HGB BLD-MCNC: 12.2 G/DL (ref 13–16)
HGB BLD-MCNC: 12.3 G/DL (ref 13–16)
IMM GRANULOCYTES # BLD AUTO: 0 K/UL (ref 0–0.04)
IMM GRANULOCYTES NFR BLD AUTO: 0 % (ref 0–0.5)
LYMPHOCYTES # BLD: 1.4 K/UL (ref 0.9–3.6)
LYMPHOCYTES NFR BLD: 21 % (ref 21–52)
MAGNESIUM SERPL-MCNC: 2 MG/DL (ref 1.6–2.6)
MCH RBC QN AUTO: 28.1 PG (ref 24–34)
MCH RBC QN AUTO: 28.1 PG (ref 24–34)
MCHC RBC AUTO-ENTMCNC: 32.4 G/DL (ref 31–37)
MCHC RBC AUTO-ENTMCNC: 32.5 G/DL (ref 31–37)
MCV RBC AUTO: 86.7 FL (ref 78–100)
MCV RBC AUTO: 86.9 FL (ref 78–100)
MONOCYTES # BLD: 0.6 K/UL (ref 0.05–1.2)
MONOCYTES NFR BLD: 9 % (ref 3–10)
NEUTS SEG # BLD: 4.7 K/UL (ref 1.8–8)
NEUTS SEG NFR BLD: 68 % (ref 40–73)
NRBC # BLD: 0 K/UL (ref 0–0.01)
NRBC # BLD: 0 K/UL (ref 0–0.01)
NRBC BLD-RTO: 0 PER 100 WBC
NRBC BLD-RTO: 0 PER 100 WBC
PLATELET # BLD AUTO: 223 K/UL (ref 135–420)
PLATELET # BLD AUTO: 239 K/UL (ref 135–420)
PMV BLD AUTO: 10.1 FL (ref 9.2–11.8)
PMV BLD AUTO: 10.3 FL (ref 9.2–11.8)
POTASSIUM SERPL-SCNC: 3.3 MMOL/L (ref 3.5–5.5)
PROT SERPL-MCNC: 6 G/DL (ref 6.4–8.2)
RBC # BLD AUTO: 4.34 M/UL (ref 4.35–5.65)
RBC # BLD AUTO: 4.37 M/UL (ref 4.35–5.65)
SODIUM SERPL-SCNC: 142 MMOL/L (ref 136–145)
TROPONIN I SERPL HS-MCNC: 262 NG/L (ref 0–78)
TROPONIN I SERPL HS-MCNC: 446 NG/L (ref 0–78)
TROPONIN I SERPL HS-MCNC: 469 NG/L (ref 0–78)
WBC # BLD AUTO: 6.9 K/UL (ref 4.6–13.2)
WBC # BLD AUTO: 7 K/UL (ref 4.6–13.2)

## 2023-08-13 PROCEDURE — 1100000003 HC PRIVATE W/ TELEMETRY

## 2023-08-13 PROCEDURE — 99253 IP/OBS CNSLTJ NEW/EST LOW 45: CPT | Performed by: INTERNAL MEDICINE

## 2023-08-13 PROCEDURE — 36415 COLL VENOUS BLD VENIPUNCTURE: CPT

## 2023-08-13 PROCEDURE — 2580000003 HC RX 258: Performed by: FAMILY MEDICINE

## 2023-08-13 PROCEDURE — 93005 ELECTROCARDIOGRAM TRACING: CPT | Performed by: FAMILY MEDICINE

## 2023-08-13 PROCEDURE — 82962 GLUCOSE BLOOD TEST: CPT

## 2023-08-13 PROCEDURE — 93010 ELECTROCARDIOGRAM REPORT: CPT | Performed by: INTERNAL MEDICINE

## 2023-08-13 PROCEDURE — 80053 COMPREHEN METABOLIC PANEL: CPT

## 2023-08-13 PROCEDURE — 84484 ASSAY OF TROPONIN QUANT: CPT

## 2023-08-13 PROCEDURE — 83735 ASSAY OF MAGNESIUM: CPT

## 2023-08-13 PROCEDURE — 6370000000 HC RX 637 (ALT 250 FOR IP): Performed by: FAMILY MEDICINE

## 2023-08-13 PROCEDURE — 85025 COMPLETE CBC W/AUTO DIFF WBC: CPT

## 2023-08-13 PROCEDURE — 85730 THROMBOPLASTIN TIME PARTIAL: CPT

## 2023-08-13 PROCEDURE — 6360000002 HC RX W HCPCS: Performed by: FAMILY MEDICINE

## 2023-08-13 PROCEDURE — 85027 COMPLETE CBC AUTOMATED: CPT

## 2023-08-13 RX ORDER — HYDRALAZINE HYDROCHLORIDE 25 MG/1
25 TABLET, FILM COATED ORAL EVERY 6 HOURS PRN
Status: DISCONTINUED | OUTPATIENT
Start: 2023-08-13 | End: 2023-08-14 | Stop reason: HOSPADM

## 2023-08-13 RX ORDER — FAMOTIDINE 20 MG/1
20 TABLET, FILM COATED ORAL 2 TIMES DAILY
Status: DISCONTINUED | OUTPATIENT
Start: 2023-08-13 | End: 2023-08-14 | Stop reason: HOSPADM

## 2023-08-13 RX ORDER — HEPARIN SODIUM 1000 [USP'U]/ML
4000 INJECTION, SOLUTION INTRAVENOUS; SUBCUTANEOUS ONCE
Status: COMPLETED | OUTPATIENT
Start: 2023-08-13 | End: 2023-08-13

## 2023-08-13 RX ORDER — METOPROLOL TARTRATE 50 MG/1
50 TABLET, FILM COATED ORAL 2 TIMES DAILY
Status: DISCONTINUED | OUTPATIENT
Start: 2023-08-13 | End: 2023-08-14 | Stop reason: HOSPADM

## 2023-08-13 RX ORDER — HEPARIN SODIUM 1000 [USP'U]/ML
2000 INJECTION, SOLUTION INTRAVENOUS; SUBCUTANEOUS PRN
Status: DISCONTINUED | OUTPATIENT
Start: 2023-08-13 | End: 2023-08-14 | Stop reason: HOSPADM

## 2023-08-13 RX ORDER — HEPARIN SODIUM 10000 [USP'U]/100ML
5-30 INJECTION, SOLUTION INTRAVENOUS CONTINUOUS
Status: DISCONTINUED | OUTPATIENT
Start: 2023-08-13 | End: 2023-08-14 | Stop reason: HOSPADM

## 2023-08-13 RX ORDER — HEPARIN SODIUM 1000 [USP'U]/ML
4000 INJECTION, SOLUTION INTRAVENOUS; SUBCUTANEOUS PRN
Status: DISCONTINUED | OUTPATIENT
Start: 2023-08-13 | End: 2023-08-14 | Stop reason: HOSPADM

## 2023-08-13 RX ADMIN — HEPARIN SODIUM 4000 UNITS: 1000 INJECTION INTRAVENOUS; SUBCUTANEOUS at 17:07

## 2023-08-13 RX ADMIN — ASPIRIN 81 MG: 81 TABLET, CHEWABLE ORAL at 10:01

## 2023-08-13 RX ADMIN — NITROGLYCERIN 0.5 INCH: 20 OINTMENT TOPICAL at 02:17

## 2023-08-13 RX ADMIN — FAMOTIDINE 20 MG: 20 TABLET, FILM COATED ORAL at 02:18

## 2023-08-13 RX ADMIN — NITROGLYCERIN 0.5 INCH: 20 OINTMENT TOPICAL at 21:52

## 2023-08-13 RX ADMIN — SODIUM CHLORIDE, PRESERVATIVE FREE 10 ML: 5 INJECTION INTRAVENOUS at 10:03

## 2023-08-13 RX ADMIN — HYDRALAZINE HYDROCHLORIDE 25 MG: 25 TABLET, FILM COATED ORAL at 17:11

## 2023-08-13 RX ADMIN — EZETIMIBE 10 MG: 10 TABLET ORAL at 10:01

## 2023-08-13 RX ADMIN — METOPROLOL TARTRATE 50 MG: 50 TABLET ORAL at 10:01

## 2023-08-13 RX ADMIN — HEPARIN SODIUM 8 UNITS/KG/HR: 10000 INJECTION, SOLUTION INTRAVENOUS at 06:54

## 2023-08-13 RX ADMIN — RANOLAZINE 500 MG: 500 TABLET, FILM COATED, EXTENDED RELEASE ORAL at 21:52

## 2023-08-13 RX ADMIN — RANOLAZINE 500 MG: 500 TABLET, FILM COATED, EXTENDED RELEASE ORAL at 10:01

## 2023-08-13 RX ADMIN — SODIUM CHLORIDE, PRESERVATIVE FREE 10 ML: 5 INJECTION INTRAVENOUS at 21:56

## 2023-08-13 RX ADMIN — ACETAMINOPHEN 650 MG: 325 TABLET ORAL at 11:59

## 2023-08-13 RX ADMIN — CLOPIDOGREL BISULFATE 75 MG: 75 TABLET ORAL at 10:01

## 2023-08-13 RX ADMIN — METOPROLOL TARTRATE 50 MG: 50 TABLET ORAL at 21:52

## 2023-08-13 RX ADMIN — LOSARTAN POTASSIUM 50 MG: 50 TABLET, FILM COATED ORAL at 10:01

## 2023-08-13 RX ADMIN — POTASSIUM BICARBONATE 40 MEQ: 782 TABLET, EFFERVESCENT ORAL at 06:53

## 2023-08-13 RX ADMIN — FAMOTIDINE 20 MG: 20 TABLET, FILM COATED ORAL at 21:52

## 2023-08-13 RX ADMIN — ATORVASTATIN CALCIUM 80 MG: 20 TABLET, FILM COATED ORAL at 10:01

## 2023-08-13 RX ADMIN — FAMOTIDINE 20 MG: 20 TABLET, FILM COATED ORAL at 10:01

## 2023-08-13 RX ADMIN — HEPARIN SODIUM 4000 UNITS: 1000 INJECTION INTRAVENOUS; SUBCUTANEOUS at 06:50

## 2023-08-13 NOTE — PROGRESS NOTES
Received  phone report from ER nurse Ashly Sosa.  Report given per SBAR and all questions asked/answered at this time

## 2023-08-13 NOTE — ED NOTES
Floor not ready for report at this time, will tc again in 10 minutes.      Dawna Agrawal RN  08/12/23 8530

## 2023-08-13 NOTE — PLAN OF CARE
Problem: Discharge Planning  Goal: Discharge to home or other facility with appropriate resources  8/12/2023 2235 by Rody Gottlieb RN  Outcome: Progressing  8/12/2023 2235 by Rody Gottlieb RN  Outcome: Progressing     Problem: Safety - Adult  Goal: Free from fall injury  8/12/2023 2235 by Rody Gottlieb RN  Outcome: Progressing  8/12/2023 2235 by Rody Gottlieb RN  Outcome: Progressing     Problem: Chronic Conditions and Co-morbidities  Goal: Patient's chronic conditions and co-morbidity symptoms are monitored and maintained or improved  Outcome: Progressing

## 2023-08-13 NOTE — PROGRESS NOTES
Admitted with chest pain and trop increase while visiting [facility] in [facility]   For perfusion scan tomorrow

## 2023-08-13 NOTE — PROGRESS NOTES
Care manager interviewed patient for admission assessment - he and his wife of 52 years are from [facility] and visiting [facility] when chest pain occurred; primary MD is cardiologist at [facility]. Patient is awaiting cardiology consult and cardiologist is on unit and made aware of consult; per patient they are to leave [facility] tomorrow and he is hopeful for discharge for follow up in [facility].

## 2023-08-13 NOTE — ED NOTES
Report called to Nakul Rodriguez RN. No additional questions at this time.      Raman Bowman RN  08/12/23 4083

## 2023-08-13 NOTE — CONSULTS
Cardiolology  Inpatient Consult      Patient: Yoko Walters               Sex: male          DOA: 8/12/2023       YOB: 1955      Age:  77 y.o.        LOS:  LOS: 1 day      Yoko Walters is a 77 y.o. male admitted for Chest pain [R07.9]  Chest pain, unspecified type [R07.9]    Recommendations:  Myocardial perfusion imaging study  Continue heparin for now  Subsequent recommendations depending upon the results of the MPI      Impression:  Chest pain with elevated troponin  Exertional angina  Known coronary disease with multiple stents  Hypertension  Diabetes  Atrial fibrillation  Other problems as enumerated below    Patient Active Problem List    Diagnosis Date Noted    CAD (coronary artery disease) 08/13/2023    BMI 35.0-35.9,adult 08/13/2023    Mobitz type 2 second degree atrioventricular block 08/13/2023    Type 2 diabetes mellitus ([address]) 08/13/2023    HTN (hypertension) 08/13/2023    Atrial fibrillation ([address]) 08/13/2023    Anticoagulated 08/13/2023    Chest pain 08/12/2023      Antonio Santos MD  Past Medical History:   Diagnosis Date    CAD (coronary artery disease) 01/11/2023    stent, pt stes that he has had 3 MIs    Diabetes ([address])     Hypertension       Past Surgical History:   Procedure Laterality Date    BACK SURGERY      discectomy and lumbar laminectomy at 42years old    [address] [facility]      Pt states he has 29 cardiac stents placed in [place]. He moved to [address] [place] [address] in Nov. 2020. Last cardiac stent placement was 10/2020.      Allergies   Allergen Reactions    Levofloxacin Other (See Comments)     Severe joint pain      Family History   Problem Relation Age of Onset    Heart Disease Brother     Heart Disease Father 44        MI      Current Facility-Administered Medications   Medication Dose Route Frequency    nitroglycerin (NITRO-BID) 2 % ointment 0.5 inch  0.5 inch Topical BID    metoprolol tartrate (LOPRESSOR) tablet 50 mg  50 mg Oral BID    hydrALAZINE (APRESOLINE)

## 2023-08-14 ENCOUNTER — APPOINTMENT (OUTPATIENT)
Facility: HOSPITAL | Age: 68
DRG: 313 | End: 2023-08-14
Payer: MEDICARE

## 2023-08-14 VITALS
BODY MASS INDEX: 37.22 KG/M2 | HEIGHT: 73 IN | HEART RATE: 61 BPM | TEMPERATURE: 97.3 F | RESPIRATION RATE: 17 BRPM | DIASTOLIC BLOOD PRESSURE: 80 MMHG | WEIGHT: 280.87 LBS | OXYGEN SATURATION: 99 % | SYSTOLIC BLOOD PRESSURE: 150 MMHG

## 2023-08-14 LAB
ALBUMIN SERPL-MCNC: 3.3 G/DL (ref 3.4–5)
ALBUMIN/GLOB SERPL: 1.1 (ref 0.8–1.7)
ALP SERPL-CCNC: 95 U/L (ref 45–117)
ALT SERPL-CCNC: 20 U/L (ref 16–61)
ANION GAP SERPL CALC-SCNC: 6 MMOL/L (ref 3–18)
APTT PPP: 114.4 SEC (ref 23–36.4)
APTT PPP: 50.2 SEC (ref 23–36.4)
APTT PPP: 60.4 SEC (ref 23–36.4)
AST SERPL-CCNC: 10 U/L (ref 10–38)
BASOPHILS # BLD: 0 K/UL (ref 0–0.1)
BASOPHILS NFR BLD: 1 % (ref 0–2)
BILIRUB SERPL-MCNC: 0.9 MG/DL (ref 0.2–1)
BUN SERPL-MCNC: 11 MG/DL (ref 7–18)
BUN/CREAT SERPL: 13 (ref 12–20)
CALCIUM SERPL-MCNC: 8.2 MG/DL (ref 8.5–10.1)
CHLORIDE SERPL-SCNC: 106 MMOL/L (ref 100–111)
CO2 SERPL-SCNC: 28 MMOL/L (ref 21–32)
CREAT SERPL-MCNC: 0.82 MG/DL (ref 0.6–1.3)
DIFFERENTIAL METHOD BLD: ABNORMAL
ECHO BSA: 2.52 M2
EKG ATRIAL RATE: 72 BPM
EKG DIAGNOSIS: NORMAL
EKG DIAGNOSIS: NORMAL
EKG P AXIS: 47 DEGREES
EKG P-R INTERVAL: 184 MS
EKG Q-T INTERVAL: 438 MS
EKG QRS DURATION: 98 MS
EKG QTC CALCULATION (BAZETT): 479 MS
EKG R AXIS: -33 DEGREES
EKG T AXIS: 45 DEGREES
EKG VENTRICULAR RATE: 72 BPM
EOSINOPHIL # BLD: 0.2 K/UL (ref 0–0.4)
EOSINOPHIL NFR BLD: 2 % (ref 0–5)
ERYTHROCYTE [DISTWIDTH] IN BLOOD BY AUTOMATED COUNT: 14.5 % (ref 11.6–14.5)
GLOBULIN SER CALC-MCNC: 3 G/DL (ref 2–4)
GLUCOSE BLD STRIP.AUTO-MCNC: 107 MG/DL (ref 70–110)
GLUCOSE SERPL-MCNC: 111 MG/DL (ref 74–99)
HCT VFR BLD AUTO: 38.4 % (ref 36–48)
HGB BLD-MCNC: 12.6 G/DL (ref 13–16)
IMM GRANULOCYTES # BLD AUTO: 0 K/UL (ref 0–0.04)
IMM GRANULOCYTES NFR BLD AUTO: 0 % (ref 0–0.5)
LYMPHOCYTES # BLD: 1.2 K/UL (ref 0.9–3.6)
LYMPHOCYTES NFR BLD: 16 % (ref 21–52)
MCH RBC QN AUTO: 28.3 PG (ref 24–34)
MCHC RBC AUTO-ENTMCNC: 32.8 G/DL (ref 31–37)
MCV RBC AUTO: 86.3 FL (ref 78–100)
MONOCYTES # BLD: 0.6 K/UL (ref 0.05–1.2)
MONOCYTES NFR BLD: 8 % (ref 3–10)
NEUTS SEG # BLD: 5.6 K/UL (ref 1.8–8)
NEUTS SEG NFR BLD: 73 % (ref 40–73)
NRBC # BLD: 0 K/UL (ref 0–0.01)
NRBC BLD-RTO: 0 PER 100 WBC
NUC REST EJECTION FRACTION: 60 %
NUC STRESS EJECTION FRACTION: 60 %
PLATELET # BLD AUTO: 211 K/UL (ref 135–420)
PMV BLD AUTO: 10.6 FL (ref 9.2–11.8)
POTASSIUM SERPL-SCNC: 3.8 MMOL/L (ref 3.5–5.5)
PROT SERPL-MCNC: 6.3 G/DL (ref 6.4–8.2)
RBC # BLD AUTO: 4.45 M/UL (ref 4.35–5.65)
SODIUM SERPL-SCNC: 140 MMOL/L (ref 136–145)
STRESS ESTIMATED WORKLOAD: 1 METS
STRESS PEAK DIAS BP: 86 MMHG
STRESS PEAK SYS BP: 180 MMHG
STRESS PERCENT HR ACHIEVED: 57 %
STRESS POST PEAK HR: 85 BPM
STRESS RATE PRESSURE PRODUCT: NORMAL BPM*MMHG
STRESS ST DEPRESSION: 0 MM
STRESS TARGET HR: 150 BPM
TROPONIN I SERPL HS-MCNC: 268 NG/L (ref 0–78)
TROPONIN I SERPL HS-MCNC: 362 NG/L (ref 0–78)
TROPONIN I SERPL HS-MCNC: 433 NG/L (ref 0–78)
WBC # BLD AUTO: 7.7 K/UL (ref 4.6–13.2)

## 2023-08-14 PROCEDURE — 93016 CV STRESS TEST SUPVJ ONLY: CPT | Performed by: INTERNAL MEDICINE

## 2023-08-14 PROCEDURE — 85025 COMPLETE CBC W/AUTO DIFF WBC: CPT

## 2023-08-14 PROCEDURE — 78452 HT MUSCLE IMAGE SPECT MULT: CPT | Performed by: INTERNAL MEDICINE

## 2023-08-14 PROCEDURE — 2580000003 HC RX 258: Performed by: FAMILY MEDICINE

## 2023-08-14 PROCEDURE — 3430000000 HC RX DIAGNOSTIC RADIOPHARMACEUTICAL: Performed by: INTERNAL MEDICINE

## 2023-08-14 PROCEDURE — 6370000000 HC RX 637 (ALT 250 FOR IP): Performed by: FAMILY MEDICINE

## 2023-08-14 PROCEDURE — 36415 COLL VENOUS BLD VENIPUNCTURE: CPT

## 2023-08-14 PROCEDURE — 80053 COMPREHEN METABOLIC PANEL: CPT

## 2023-08-14 PROCEDURE — 93017 CV STRESS TEST TRACING ONLY: CPT

## 2023-08-14 PROCEDURE — A9500 TC99M SESTAMIBI: HCPCS | Performed by: INTERNAL MEDICINE

## 2023-08-14 PROCEDURE — 6360000002 HC RX W HCPCS: Performed by: INTERNAL MEDICINE

## 2023-08-14 PROCEDURE — 99232 SBSQ HOSP IP/OBS MODERATE 35: CPT | Performed by: INTERNAL MEDICINE

## 2023-08-14 PROCEDURE — 6360000002 HC RX W HCPCS: Performed by: FAMILY MEDICINE

## 2023-08-14 PROCEDURE — 82962 GLUCOSE BLOOD TEST: CPT

## 2023-08-14 PROCEDURE — 93018 CV STRESS TEST I&R ONLY: CPT | Performed by: INTERNAL MEDICINE

## 2023-08-14 PROCEDURE — 84484 ASSAY OF TROPONIN QUANT: CPT

## 2023-08-14 PROCEDURE — 85730 THROMBOPLASTIN TIME PARTIAL: CPT

## 2023-08-14 PROCEDURE — 93010 ELECTROCARDIOGRAM REPORT: CPT | Performed by: INTERNAL MEDICINE

## 2023-08-14 RX ORDER — REGADENOSON 0.08 MG/ML
0.4 INJECTION, SOLUTION INTRAVENOUS
Status: COMPLETED | OUTPATIENT
Start: 2023-08-14 | End: 2023-08-14

## 2023-08-14 RX ORDER — TETRAKIS(2-METHOXYISOBUTYLISOCYANIDE)COPPER(I) TETRAFLUOROBORATE 1 MG/ML
35.7 INJECTION, POWDER, LYOPHILIZED, FOR SOLUTION INTRAVENOUS
Status: COMPLETED | OUTPATIENT
Start: 2023-08-14 | End: 2023-08-14

## 2023-08-14 RX ORDER — TETRAKIS(2-METHOXYISOBUTYLISOCYANIDE)COPPER(I) TETRAFLUOROBORATE 1 MG/ML
11.7 INJECTION, POWDER, LYOPHILIZED, FOR SOLUTION INTRAVENOUS
Status: COMPLETED | OUTPATIENT
Start: 2023-08-14 | End: 2023-08-14

## 2023-08-14 RX ADMIN — TETRAKIS(2-METHOXYISOBUTYLISOCYANIDE)COPPER(I) TETRAFLUOROBORATE 35.7 MILLICURIE: 1 INJECTION, POWDER, LYOPHILIZED, FOR SOLUTION INTRAVENOUS at 10:40

## 2023-08-14 RX ADMIN — TETRAKIS(2-METHOXYISOBUTYLISOCYANIDE)COPPER(I) TETRAFLUOROBORATE 11.7 MILLICURIE: 1 INJECTION, POWDER, LYOPHILIZED, FOR SOLUTION INTRAVENOUS at 08:40

## 2023-08-14 RX ADMIN — ASPIRIN 81 MG: 81 TABLET, CHEWABLE ORAL at 09:18

## 2023-08-14 RX ADMIN — EZETIMIBE 10 MG: 10 TABLET ORAL at 09:18

## 2023-08-14 RX ADMIN — METOPROLOL TARTRATE 50 MG: 50 TABLET ORAL at 09:18

## 2023-08-14 RX ADMIN — SODIUM CHLORIDE, PRESERVATIVE FREE 10 ML: 5 INJECTION INTRAVENOUS at 09:18

## 2023-08-14 RX ADMIN — LOSARTAN POTASSIUM 50 MG: 50 TABLET, FILM COATED ORAL at 09:18

## 2023-08-14 RX ADMIN — HEPARIN SODIUM 12 UNITS/KG/HR: 10000 INJECTION, SOLUTION INTRAVENOUS at 05:43

## 2023-08-14 RX ADMIN — ATORVASTATIN CALCIUM 80 MG: 20 TABLET, FILM COATED ORAL at 09:18

## 2023-08-14 RX ADMIN — REGADENOSON 0.4 MG: 0.08 INJECTION, SOLUTION INTRAVENOUS at 10:40

## 2023-08-14 RX ADMIN — FAMOTIDINE 20 MG: 20 TABLET, FILM COATED ORAL at 09:18

## 2023-08-14 RX ADMIN — CLOPIDOGREL BISULFATE 75 MG: 75 TABLET ORAL at 09:18

## 2023-08-14 RX ADMIN — HEPARIN SODIUM 4000 UNITS: 1000 INJECTION INTRAVENOUS; SUBCUTANEOUS at 16:14

## 2023-08-14 RX ADMIN — RANOLAZINE 500 MG: 500 TABLET, FILM COATED, EXTENDED RELEASE ORAL at 09:18

## 2023-08-14 RX ADMIN — NITROGLYCERIN 0.5 INCH: 20 OINTMENT TOPICAL at 09:19

## 2023-08-14 RX ADMIN — HEPARIN SODIUM 4000 UNITS: 1000 INJECTION INTRAVENOUS; SUBCUTANEOUS at 00:32

## 2023-08-14 NOTE — PLAN OF CARE
Problem: Discharge Planning  Goal: Discharge to home or other facility with appropriate resources  Outcome: Progressing  Flowsheets (Taken 8/14/2023 0800)  Discharge to home or other facility with appropriate resources:   Arrange for needed discharge resources and transportation as appropriate   Identify barriers to discharge with patient and caregiver     Problem: Safety - Adult  Goal: Free from fall injury  Outcome: Progressing     Problem: Chronic Conditions and Co-morbidities  Goal: Patient's chronic conditions and co-morbidity symptoms are monitored and maintained or improved  Outcome: Progressing  Flowsheets (Taken 8/14/2023 0800)  Care Plan - Patient's Chronic Conditions and Co-Morbidity Symptoms are Monitored and Maintained or Improved:   Monitor and assess patient's chronic conditions and comorbid symptoms for stability, deterioration, or improvement   Collaborate with multidisciplinary team to address chronic and comorbid conditions and prevent exacerbation or deterioration

## 2023-08-14 NOTE — PROGRESS NOTES
Physician Progress Note      PATIENT:               Cornelia Shields  [facility] #:                  [id number]  :                       1955  ADMIT DATE:       2023 6:55 PM  [address] [facility] DATE:  RESPONDING  PROVIDER #:        Michael Kim MD          QUERY TEXT:    Patient admitted with chest pain, noted to have atrial fibrillation and is   maintained on Eliquis. If possible, please document in progress notes and   discharge summary if you are evaluating and/or treating any of the following: The medical record reflects the following:    Risk Factors: 75-year-old male, Afib, long term use of anticoagulant, CAD,   Mobitz type 2 second degree atrioventricular block    Clinical Indicators:  75-year-old male, Afib, long term use of anticoagulant, CAD, Mobitz type 2   second degree atrioventricular block    Treatment: receiving Dalia Reece, RN, BSN, Ike Jean-Baptiste, [address] [place] [address] [place]  [email or web address]  Options provided:  -- Secondary hypercoagulable state in a patient with atrial fibrillation  -- Other - I will add my own diagnosis  -- Disagree - Not applicable / Not valid  -- Disagree - Clinically unable to determine / Unknown  -- Refer to Clinical Documentation Reviewer    PROVIDER RESPONSE TEXT:    This patient has secondary hypercoagulable state in a patient with atrial   fibrillation.     Query created by: Jodi Colon on 2023 1:06 PM      Electronically signed by:  Michael Kim MD 2023 5:49 PM

## 2023-08-14 NOTE — PLAN OF CARE
1728: patient been ready to go home since he came back from stress test. Dressed and ready to go. Waiting for DC order until this time with none noted. Paged MD.    1600: Waiting for MD to DC patient. 0930: PATIENT ALERT, ORIENTED AND IN NAD. NPO status in place.   OFF floor for stress test.     Problem: Discharge Planning  Goal: Discharge to home or other facility with appropriate resources  8/14/2023 0929 by Manjit Mata RN  Outcome: Progressing  8/14/2023 0121 by Tisha Hamilton RN  Outcome: Progressing  Flowsheets (Taken 8/13/2023 2100)  Discharge to home or other facility with appropriate resources:   Identify barriers to discharge with patient and caregiver   Identify discharge learning needs (meds, wound care, etc)     Problem: Safety - Adult  Goal: Free from fall injury  8/14/2023 0929 by Manjit Mata RN  Outcome: Progressing  8/14/2023 0121 by Tisha Hamilton RN  Outcome: Progressing     Problem: Chronic Conditions and Co-morbidities  Goal: Patient's chronic conditions and co-morbidity symptoms are monitored and maintained or improved  8/14/2023 0929 by Manjit Mata RN  Outcome: Progressing  8/14/2023 0121 by Tisha Hamilton RN  Outcome: Progressing  Flowsheets (Taken 8/13/2023 2100)  Care Plan - Patient's Chronic Conditions and Co-Morbidity Symptoms are Monitored and Maintained or Improved:   Monitor and assess patient's chronic conditions and comorbid symptoms for stability, deterioration, or improvement   Collaborate with multidisciplinary team to address chronic and comorbid conditions and prevent exacerbation or deterioration   Update acute care plan with appropriate goals if chronic or comorbid symptoms are exacerbated and prevent overall improvement and discharge

## 2023-08-14 NOTE — PLAN OF CARE
Problem: Discharge Planning  Goal: Discharge to home or other facility with appropriate resources  Outcome: Progressing  Flowsheets (Taken 8/13/2023 2100)  Discharge to home or other facility with appropriate resources:   Identify barriers to discharge with patient and caregiver   Identify discharge learning needs (meds, wound care, etc)     Problem: Safety - Adult  Goal: Free from fall injury  Outcome: Progressing     Problem: Chronic Conditions and Co-morbidities  Goal: Patient's chronic conditions and co-morbidity symptoms are monitored and maintained or improved  Outcome: Progressing  Flowsheets (Taken 8/13/2023 2100)  Care Plan - Patient's Chronic Conditions and Co-Morbidity Symptoms are Monitored and Maintained or Improved:   Monitor and assess patient's chronic conditions and comorbid symptoms for stability, deterioration, or improvement   Collaborate with multidisciplinary team to address chronic and comorbid conditions and prevent exacerbation or deterioration   Update acute care plan with appropriate goals if chronic or comorbid symptoms are exacerbated and prevent overall improvement and discharge

## 2023-08-14 NOTE — PROGRESS NOTES
Hospital day 2    Subjective:     Mr. Melina Mckeon denies chest pain, chest pressure/discomfort, dyspnea, irregular heart beat, and palpitations    Medication side effects: none    Scheduled Meds:   nitroglycerin  0.5 inch Topical BID    metoprolol tartrate  50 mg Oral BID    famotidine  20 mg Oral BID    sodium chloride flush  5-40 mL IntraVENous 2 times per day    atorvastatin  80 mg Oral Daily    clopidogrel  75 mg Oral Daily    losartan  50 mg Oral Daily    ezetimibe  10 mg Oral Daily    ranolazine  500 mg Oral BID    insulin lispro  0-4 Units SubCUTAneous 4x Daily AC & HS    aspirin  81 mg Oral Daily     Continuous Infusions:   heparin (PORCINE) Infusion 11 Units/kg/hr (08/14/23 0749)    sodium chloride       PRN Meds:hydrALAZINE, heparin (porcine), heparin (porcine), sodium chloride flush, sodium chloride, ondansetron **OR** ondansetron, polyethylene glycol, acetaminophen **OR** acetaminophen      Objective:      Physical Exam:     General Appearance:  Well developed, well nourished,alert and oriented x 3, and individual in no acute distress. Ears/Nose/Mouth/Throat:   Hearing grossly normal.          Neck: Supple. Chest:   Lungs clear to auscultation bilaterally. Cardiovascular:  Regular rate and rhythm, S1, S2 normal, no murmur. Abdomen:   Soft, non-tender, bowel sounds are active. Extremities: No edema bilaterally. Skin: Warm and dry. Cardiographics  ECG: No acute change.   Echocardiogram: not done    Imaging  Chest X-Ray: Reported image reviewed    Lab Review   Lab Results   Component Value Date/Time     08/14/2023 05:18 AM    K 3.8 08/14/2023 05:18 AM     08/14/2023 05:18 AM    CO2 28 08/14/2023 05:18 AM    BUN 11 08/14/2023 05:18 AM    CREATININE 0.82 08/14/2023 05:18 AM    GLUCOSE 111 08/14/2023 05:18 AM    CALCIUM 8.2 08/14/2023 05:18 AM     No results found for: CKTOTAL, CKMB, CKMBINDEX, TROPONINI  Lab Results   Component Value Date/Time    WBC 7.7 08/14/2023 05:18 AM    HGB 12.6 08/14/2023 05:18 AM    HCT 38.4 08/14/2023 05:18 AM    MCV 86.3 08/14/2023 05:18 AM     08/14/2023 05:18 AM       Assessment:     Chest pain with no evidence of ACS  Patient Active Problem List    Diagnosis Date Noted    CAD (coronary artery disease) 08/13/2023    BMI 35.0-35.9,adult 08/13/2023    Mobitz type 2 second degree atrioventricular block 08/13/2023    Type 2 diabetes mellitus ([address]) 08/13/2023    HTN (hypertension) 08/13/2023    Atrial fibrillation ([address]) 08/13/2023    Anticoagulated 08/13/2023    Chest pain 08/12/2023       Plan:     Patient may be discharged home from my standpoint  Follow-up with his cardiologist admitted  Myocardial perfusion imaging study was negative for ischemia  Guideline directed medical therapy       Ronnie Zeng MD

## 2023-08-16 ENCOUNTER — HOSPITAL ENCOUNTER (OUTPATIENT)
Facility: HOSPITAL | Age: 68
Setting detail: RECURRING SERIES
Discharge: HOME | End: 2023-08-19

## 2023-08-16 PROCEDURE — 9900000112 HC DAILY CARDIAC MAINTENANCE (RETAIL)

## 2023-08-21 NOTE — DISCHARGE SUMMARY
Skylar SUMMARY    Name:  Mimi Cox  MR#:   [id number]  :  1955  ACCOUNT #:  [de-identified]  ADMIT DATE:  2023  DISCHARGE DATE:  2023    DISCHARGE DIAGNOSES:  1. Chest pain. 2.  Extensive coronary artery disease. 3.  Atrial fibrillation, on anticoagulation. 4.  Type 2 diabetes. 5.  Hypertension. The patient had consultation with Cardiology and a stress test while he was in the hospital.    HOSPITAL COURSE:  He was admitted to the hospital for chest pain which was potentially angina exacerbated by heat stress, but because of his extensive previous medical history, he was admitted for monitoring and further evaluation. The patient had a normal metabolic panel. His troponins were mildly elevated and peaked at 469. There was no evidence for non-ST elevation MI or ST elevation MI. The patient was recommended for stress test which was done by Cardiology. They noted that his myocardial profusion imaging study was negative for ischemia. They recommended medical therapy and that he could be discharged. On the day of discharge, he was seen and examined. He was in good spirits with no further chest pain. Cardiac and lung exam were within normal range. Abdomen obese and nontender. Lower extremities without notable edema. The patient was discharged in the afternoon in good condition to his family and home. The patient was recommended to continue the same medications he had been on prior. DISCHARGE MEDICATIONS:  Include,  1. Eliquis 5 mg twice daily. 2.  Aspirin 81 mg daily. 3.  Lipitor 80 mg at night. 4.  Plavix 75 mg daily. 5.  Diltiazem 120 mg daily. 6.  Jardiance 25 mg daily. 7.  Cozaar 50 mg daily. 8.  Metformin 500 mg twice daily. 9.  Lopressor 50 mg twice daily. 10.  Ranexa 500 mg daily. DISCHARGE INSTRUCTIONS:  He was recommended to follow up with his primary care and cardiologist within one week.   The patient was discharged to go on a

## 2023-08-23 ENCOUNTER — HOSPITAL ENCOUNTER (OUTPATIENT)
Facility: HOSPITAL | Age: 68
Setting detail: RECURRING SERIES
Discharge: HOME | End: 2023-08-26

## 2023-08-23 VITALS — WEIGHT: 273 LBS | BODY MASS INDEX: 36.02 KG/M2

## 2023-08-23 PROCEDURE — 9900000112 HC DAILY CARDIAC MAINTENANCE (RETAIL)

## 2023-08-23 ASSESSMENT — EXERCISE STRESS TEST
PEAK_RPE: 12
PEAK_METS: 3.1

## 2023-08-25 ENCOUNTER — HOSPITAL ENCOUNTER (OUTPATIENT)
Facility: HOSPITAL | Age: 68
Setting detail: RECURRING SERIES
Discharge: HOME | End: 2023-08-28

## 2023-08-25 VITALS — WEIGHT: 274 LBS | BODY MASS INDEX: 36.15 KG/M2

## 2023-08-25 DIAGNOSIS — I20.9 ANGINAL SYNDROME (HCC): Primary | ICD-10-CM

## 2023-08-25 PROCEDURE — 9900000112 HC DAILY CARDIAC MAINTENANCE (RETAIL)

## 2023-08-25 ASSESSMENT — EXERCISE STRESS TEST
PEAK_METS: 3.1
PEAK_RPE: 12

## 2023-08-30 ENCOUNTER — HOSPITAL ENCOUNTER (OUTPATIENT)
Facility: HOSPITAL | Age: 68
Setting detail: RECURRING SERIES
Discharge: HOME | End: 2023-09-02

## 2023-08-30 VITALS — BODY MASS INDEX: 36.02 KG/M2 | WEIGHT: 273 LBS

## 2023-08-30 PROCEDURE — 9900000112 HC DAILY CARDIAC MAINTENANCE (RETAIL)

## 2023-08-30 ASSESSMENT — EXERCISE STRESS TEST
PEAK_RPE: 12
PEAK_METS: 3.1

## 2023-09-01 ENCOUNTER — HOSPITAL ENCOUNTER (OUTPATIENT)
Facility: HOSPITAL | Age: 68
Setting detail: RECURRING SERIES
Discharge: HOME | End: 2023-09-04

## 2023-09-01 VITALS — WEIGHT: 272 LBS | BODY MASS INDEX: 35.89 KG/M2

## 2023-09-01 PROCEDURE — 9900000112 HC DAILY CARDIAC MAINTENANCE (RETAIL)

## 2023-09-01 ASSESSMENT — EXERCISE STRESS TEST
PEAK_METS: 3.1
PEAK_RPE: 12

## 2023-09-06 ENCOUNTER — HOSPITAL ENCOUNTER (OUTPATIENT)
Facility: HOSPITAL | Age: 68
Setting detail: RECURRING SERIES
Discharge: HOME | End: 2023-09-09

## 2023-09-06 VITALS — WEIGHT: 275 LBS | BODY MASS INDEX: 36.28 KG/M2

## 2023-09-06 PROCEDURE — 9900000112 HC DAILY CARDIAC MAINTENANCE (RETAIL)

## 2023-09-06 ASSESSMENT — EXERCISE STRESS TEST
PEAK_METS: 3.1
PEAK_RPE: 12

## 2023-09-08 ENCOUNTER — HOSPITAL ENCOUNTER (OUTPATIENT)
Facility: HOSPITAL | Age: 68
Discharge: HOME | End: 2023-09-11

## 2023-09-08 VITALS — BODY MASS INDEX: 36.02 KG/M2 | WEIGHT: 273 LBS

## 2023-09-08 PROCEDURE — 9900000112 HC DAILY CARDIAC MAINTENANCE (RETAIL)

## 2023-09-08 ASSESSMENT — EXERCISE STRESS TEST
PEAK_METS: 3.1
PEAK_RPE: 12

## 2023-09-13 ENCOUNTER — HOSPITAL ENCOUNTER (OUTPATIENT)
Facility: HOSPITAL | Age: 68
Setting detail: RECURRING SERIES
Discharge: HOME | End: 2023-09-16
Payer: MEDICARE

## 2023-09-13 VITALS — HEIGHT: 73 IN | BODY MASS INDEX: 36.18 KG/M2 | WEIGHT: 273 LBS

## 2023-09-13 PROCEDURE — 93798 PHYS/QHP OP CAR RHAB W/ECG: CPT

## 2023-09-13 PROCEDURE — 93797 PHYS/QHP OP CAR RHAB WO ECG: CPT

## 2023-09-13 ASSESSMENT — EXERCISE STRESS TEST
PEAK_BP: 178/84
PEAK_BP: 178/84
PEAK_HR: 84
PEAK_RPE: 12
PEAK_BP: 178/84
PEAK_HR: 84
PEAK_RPE: 12
PEAK_METS: 2.6

## 2023-09-13 ASSESSMENT — PATIENT HEALTH QUESTIONNAIRE - PHQ9
10. IF YOU CHECKED OFF ANY PROBLEMS, HOW DIFFICULT HAVE THESE PROBLEMS MADE IT FOR YOU TO DO YOUR WORK, TAKE CARE OF THINGS AT HOME, OR GET ALONG WITH OTHER PEOPLE: 0
2. FEELING DOWN, DEPRESSED OR HOPELESS: 0
1. LITTLE INTEREST OR PLEASURE IN DOING THINGS: 1
8. MOVING OR SPEAKING SO SLOWLY THAT OTHER PEOPLE COULD HAVE NOTICED. OR THE OPPOSITE, BEING SO FIGETY OR RESTLESS THAT YOU HAVE BEEN MOVING AROUND A LOT MORE THAN USUAL: 0
SUM OF ALL RESPONSES TO PHQ9 QUESTIONS 1 & 2: 1
SUM OF ALL RESPONSES TO PHQ QUESTIONS 1-9: 5
3. TROUBLE FALLING OR STAYING ASLEEP: 0
SUM OF ALL RESPONSES TO PHQ QUESTIONS 1-9: 5
9. THOUGHTS THAT YOU WOULD BE BETTER OFF DEAD, OR OF HURTING YOURSELF: 0
SUM OF ALL RESPONSES TO PHQ QUESTIONS 1-9: 5
7. TROUBLE CONCENTRATING ON THINGS, SUCH AS READING THE NEWSPAPER OR WATCHING TELEVISION: 1
6. FEELING BAD ABOUT YOURSELF - OR THAT YOU ARE A FAILURE OR HAVE LET YOURSELF OR YOUR FAMILY DOWN: 0
4. FEELING TIRED OR HAVING LITTLE ENERGY: 2
5. POOR APPETITE OR OVEREATING: 1
SUM OF ALL RESPONSES TO PHQ QUESTIONS 1-9: 5

## 2023-09-13 ASSESSMENT — EJECTION FRACTION
EF_VALUE: 60
EF_VALUE: 60

## 2023-09-13 ASSESSMENT — LIFESTYLE VARIABLES: SMOKELESS_TOBACCO: NO

## 2023-09-13 NOTE — CARDIO/PULMONARY
INTAKE APPOINTMENT NOTE  2023    NAME: Doreen Wolf : 1955 AGE: 77 y.o. GENDER: male    CARDIAC REHAB ADMITTING DIAGNOSIS: Angina pectoris, unspecified [I20.9]    REFERRING PHYSICIAN: Chapman Babinski, MD    MEDICAL HX:  Past Medical History:   Diagnosis Date    CAD (coronary artery disease) 2023    stent, pt states that he has had 3 MIs and 29 stents in the past    Diabetes ([address])     Hypertension        LABS:     No results found for: \"HBA1C\", \"[id number]\"  No results found for: \"CHOL\", \"CHOLPOCT\", \"CHOLX\", \"CHLST\", \"CHOLV\", \"HDL\", \"HDLPOC\", \"HDLC\", \"LDL\", \"LDLC\", \"VLDLC\", \"VLDL\", \"TGLX\", \"TRIGL\"      ANTHROPOMETRICS:      Ht Readings from Last 1 Encounters:   23 1.854 m (6' 1\")      Wt Readings from Last 1 Encounters:   23 123.8 kg (273 lb)        WAIST: 49.5       VISIT SUMMARY:    Doreen Wolf 77 y.o. presented to Cardiac Rehab for program orientation and 6 minute walk test today with a primary diagnosis of Angina pectoris, unspecified [I20.9]. EF is 60 % Cardiac risk factors include family history, dyslipidemia, diabetes mellitus, obesity, hypertension, prior MI. Patient quit smoking in . Doreen Wolf is  and lives with his wife. They have 3 grown children. He is a retired bhatti. Patient was evaluated for depression using the PHQ-9 assessment tool with a result of 5 which is considered mild. The result was discussed with patient. Patient denied chest pain or SOB during 6 minute walk test and was in SR 1 AVB, occ PACs. Patient walked 0.19 mi at a speed of 2 mph and grade of 0 % for a final MET level of 2.6. Exercise prescription developed using exercise tolerance results and patient stated goals, to be supplemented with home exercise recommendations. Education manual given to patient and reviewed. Patient will attend cardiac rehab 2 times/week which will include both exercise and education sessions.   Daksha Queen attended phase ll  - 2023

## 2023-09-15 ENCOUNTER — HOSPITAL ENCOUNTER (OUTPATIENT)
Facility: HOSPITAL | Age: 68
Setting detail: RECURRING SERIES
Discharge: HOME | End: 2023-09-18
Payer: MEDICARE

## 2023-09-15 VITALS — WEIGHT: 273 LBS | BODY MASS INDEX: 36.02 KG/M2

## 2023-09-15 PROCEDURE — 93798 PHYS/QHP OP CAR RHAB W/ECG: CPT

## 2023-09-15 ASSESSMENT — EXERCISE STRESS TEST
PEAK_METS: 2.6
PEAK_RPE: 12

## 2023-09-20 ENCOUNTER — HOSPITAL ENCOUNTER (OUTPATIENT)
Facility: HOSPITAL | Age: 68
Setting detail: RECURRING SERIES
Discharge: HOME | End: 2023-09-23
Payer: MEDICARE

## 2023-09-20 VITALS — BODY MASS INDEX: 35.89 KG/M2 | WEIGHT: 272 LBS

## 2023-09-20 PROCEDURE — 93798 PHYS/QHP OP CAR RHAB W/ECG: CPT

## 2023-09-20 ASSESSMENT — EXERCISE STRESS TEST
PEAK_METS: 2.6
PEAK_RPE: 12

## 2023-09-22 ENCOUNTER — HOSPITAL ENCOUNTER (OUTPATIENT)
Facility: HOSPITAL | Age: 68
Setting detail: RECURRING SERIES
Discharge: HOME | End: 2023-09-25
Payer: MEDICARE

## 2023-09-22 VITALS — BODY MASS INDEX: 35.75 KG/M2 | WEIGHT: 271 LBS

## 2023-09-22 PROCEDURE — 93798 PHYS/QHP OP CAR RHAB W/ECG: CPT

## 2023-09-22 ASSESSMENT — EXERCISE STRESS TEST
PEAK_METS: 2.6
PEAK_RPE: 12

## 2023-09-27 ENCOUNTER — HOSPITAL ENCOUNTER (OUTPATIENT)
Facility: HOSPITAL | Age: 68
Setting detail: RECURRING SERIES
Discharge: HOME | End: 2023-09-30
Payer: MEDICARE

## 2023-09-27 VITALS — WEIGHT: 272 LBS | BODY MASS INDEX: 35.89 KG/M2

## 2023-09-27 PROCEDURE — 93798 PHYS/QHP OP CAR RHAB W/ECG: CPT

## 2023-09-27 ASSESSMENT — EXERCISE STRESS TEST
PEAK_RPE: 12
PEAK_METS: 2.6

## 2023-09-29 ENCOUNTER — HOSPITAL ENCOUNTER (OUTPATIENT)
Facility: HOSPITAL | Age: 68
Setting detail: RECURRING SERIES
End: 2023-09-29
Payer: MEDICARE

## 2023-09-29 VITALS — WEIGHT: 271 LBS | BODY MASS INDEX: 35.75 KG/M2

## 2023-09-29 PROCEDURE — 93798 PHYS/QHP OP CAR RHAB W/ECG: CPT

## 2023-09-29 ASSESSMENT — EXERCISE STRESS TEST
PEAK_METS: 2.6
PEAK_RPE: 12

## 2023-10-04 ENCOUNTER — HOSPITAL ENCOUNTER (OUTPATIENT)
Facility: HOSPITAL | Age: 68
Setting detail: RECURRING SERIES
Discharge: HOME | End: 2023-10-07
Payer: COMMERCIAL

## 2023-10-04 VITALS — WEIGHT: 273 LBS | BODY MASS INDEX: 36.02 KG/M2

## 2023-10-04 PROCEDURE — 93798 PHYS/QHP OP CAR RHAB W/ECG: CPT

## 2023-10-04 ASSESSMENT — EJECTION FRACTION: EF_VALUE: 60

## 2023-10-04 ASSESSMENT — EXERCISE STRESS TEST
PEAK_METS: 2.6
PEAK_RPE: 12

## 2023-10-04 ASSESSMENT — LIFESTYLE VARIABLES: SMOKELESS_TOBACCO: NO

## 2023-10-06 ENCOUNTER — HOSPITAL ENCOUNTER (OUTPATIENT)
Facility: HOSPITAL | Age: 68
Setting detail: RECURRING SERIES
Discharge: HOME | End: 2023-10-09
Payer: COMMERCIAL

## 2023-10-06 VITALS — WEIGHT: 272 LBS | BODY MASS INDEX: 35.89 KG/M2

## 2023-10-06 PROCEDURE — 93798 PHYS/QHP OP CAR RHAB W/ECG: CPT

## 2023-10-06 ASSESSMENT — EXERCISE STRESS TEST
PEAK_RPE: 12
PEAK_METS: 2.7

## 2023-10-11 ENCOUNTER — HOSPITAL ENCOUNTER (OUTPATIENT)
Facility: HOSPITAL | Age: 68
Setting detail: RECURRING SERIES
Discharge: HOME | End: 2023-10-14
Payer: COMMERCIAL

## 2023-10-11 VITALS — WEIGHT: 274 LBS | BODY MASS INDEX: 36.15 KG/M2

## 2023-10-11 PROCEDURE — 93798 PHYS/QHP OP CAR RHAB W/ECG: CPT

## 2023-10-11 ASSESSMENT — EXERCISE STRESS TEST
PEAK_RPE: 12
PEAK_METS: 2.7

## 2023-10-13 ENCOUNTER — HOSPITAL ENCOUNTER (OUTPATIENT)
Facility: HOSPITAL | Age: 68
Setting detail: RECURRING SERIES
Discharge: HOME | End: 2023-10-16
Payer: COMMERCIAL

## 2023-10-13 VITALS — BODY MASS INDEX: 35.89 KG/M2 | WEIGHT: 272 LBS

## 2023-10-13 PROCEDURE — 93798 PHYS/QHP OP CAR RHAB W/ECG: CPT

## 2023-10-13 ASSESSMENT — EXERCISE STRESS TEST
PEAK_RPE: 2
PEAK_METS: 2.7

## 2023-10-18 ENCOUNTER — HOSPITAL ENCOUNTER (OUTPATIENT)
Facility: HOSPITAL | Age: 68
Setting detail: RECURRING SERIES
Discharge: HOME | End: 2023-10-21
Payer: COMMERCIAL

## 2023-10-18 VITALS — WEIGHT: 273 LBS | BODY MASS INDEX: 36.02 KG/M2

## 2023-10-18 PROCEDURE — 93798 PHYS/QHP OP CAR RHAB W/ECG: CPT

## 2023-10-18 ASSESSMENT — EXERCISE STRESS TEST
PEAK_RPE: 12
PEAK_METS: 2.7

## 2023-10-25 ENCOUNTER — HOSPITAL ENCOUNTER (OUTPATIENT)
Facility: HOSPITAL | Age: 68
Setting detail: RECURRING SERIES
Discharge: HOME | End: 2023-10-28
Payer: COMMERCIAL

## 2023-10-25 VITALS — WEIGHT: 274 LBS | BODY MASS INDEX: 36.15 KG/M2

## 2023-10-25 PROCEDURE — 93798 PHYS/QHP OP CAR RHAB W/ECG: CPT

## 2023-10-25 ASSESSMENT — LIFESTYLE VARIABLES: SMOKELESS_TOBACCO: NO

## 2023-10-25 ASSESSMENT — EXERCISE STRESS TEST
PEAK_RPE: 12
PEAK_METS: 2.7

## 2023-10-25 ASSESSMENT — EJECTION FRACTION: EF_VALUE: 60

## 2023-10-27 ENCOUNTER — APPOINTMENT (OUTPATIENT)
Facility: HOSPITAL | Age: 68
End: 2023-10-27
Payer: COMMERCIAL

## 2023-11-01 ENCOUNTER — APPOINTMENT (OUTPATIENT)
Facility: HOSPITAL | Age: 68
End: 2023-11-01
Payer: COMMERCIAL

## 2023-11-03 ENCOUNTER — APPOINTMENT (OUTPATIENT)
Facility: HOSPITAL | Age: 68
End: 2023-11-03
Payer: COMMERCIAL

## 2023-11-03 NOTE — PATIENT PROFILE ADULT - NSPROMEDSPUMP_GEN_A_NUR
Procedure Note - Anesthesia





- Nerve Block Performed


  ** Bilateral Erector Spinae Single


Time Out Performed: Yes


Date of Procedure: 10/31/23


Procedure Start Time: 10:14


Procedure Stop Time: 10:20


Location of Patient: PreOp


Indication: Acute Post-Operative Pain, Requested by Surgeon


Sedation Type: Sedate with meaningful contact maintained


Preparation: Sterile Prep


Position: Prone


Needle Types: Pajunk


Needle Gauge: 21


Ultrasound used to visualize needle placement: Yes


Ultrasound used to observe medication spread: Yes


Blood Aspirated: No


Pain Paresthesia on Injection Noted: No


Resistance on Injection: Normal


Image Stored and Saved: Yes


Events: Uneventful and Well Tolerated (Ropivacaine 0.5% 15 mL plus normal saline

10 mL plus dexamethasone 4 mg given bilaterally at L1) none

## 2023-11-08 ENCOUNTER — HOSPITAL ENCOUNTER (OUTPATIENT)
Facility: HOSPITAL | Age: 68
Setting detail: RECURRING SERIES
Discharge: HOME | End: 2023-11-11
Payer: COMMERCIAL

## 2023-11-08 VITALS — BODY MASS INDEX: 36.15 KG/M2 | WEIGHT: 274 LBS

## 2023-11-08 PROCEDURE — 93798 PHYS/QHP OP CAR RHAB W/ECG: CPT

## 2023-11-08 ASSESSMENT — EXERCISE STRESS TEST
PEAK_METS: 2.7
PEAK_RPE: 12

## 2023-11-10 ENCOUNTER — HOSPITAL ENCOUNTER (OUTPATIENT)
Facility: HOSPITAL | Age: 68
Setting detail: RECURRING SERIES
Discharge: HOME | End: 2023-11-13
Payer: COMMERCIAL

## 2023-11-10 VITALS — WEIGHT: 274 LBS | BODY MASS INDEX: 36.15 KG/M2

## 2023-11-10 PROCEDURE — 93798 PHYS/QHP OP CAR RHAB W/ECG: CPT

## 2023-11-10 ASSESSMENT — EXERCISE STRESS TEST
PEAK_METS: 2.7
PEAK_RPE: 12

## 2023-11-15 ENCOUNTER — HOSPITAL ENCOUNTER (OUTPATIENT)
Facility: HOSPITAL | Age: 68
Setting detail: RECURRING SERIES
End: 2023-11-15
Payer: COMMERCIAL

## 2023-11-15 ASSESSMENT — LIFESTYLE VARIABLES: SMOKELESS_TOBACCO: NO

## 2023-11-15 ASSESSMENT — EJECTION FRACTION: EF_VALUE: 60

## 2023-11-17 ENCOUNTER — HOSPITAL ENCOUNTER (OUTPATIENT)
Facility: HOSPITAL | Age: 68
Setting detail: RECURRING SERIES
Discharge: HOME | End: 2023-11-20
Payer: COMMERCIAL

## 2023-11-17 VITALS — WEIGHT: 275 LBS | BODY MASS INDEX: 36.28 KG/M2

## 2023-11-17 PROCEDURE — 93798 PHYS/QHP OP CAR RHAB W/ECG: CPT

## 2023-11-17 ASSESSMENT — EJECTION FRACTION: EF_VALUE: 60

## 2023-11-17 ASSESSMENT — EXERCISE STRESS TEST
PEAK_METS: 2.7
PEAK_RPE: 12

## 2023-11-17 ASSESSMENT — LIFESTYLE VARIABLES: SMOKELESS_TOBACCO: NO

## 2023-11-22 ENCOUNTER — HOSPITAL ENCOUNTER (OUTPATIENT)
Facility: HOSPITAL | Age: 68
Setting detail: RECURRING SERIES
Discharge: HOME | End: 2023-11-25
Payer: COMMERCIAL

## 2023-11-22 VITALS — BODY MASS INDEX: 36.28 KG/M2 | WEIGHT: 275 LBS

## 2023-11-22 PROCEDURE — 93798 PHYS/QHP OP CAR RHAB W/ECG: CPT

## 2023-11-22 PROCEDURE — 9900000112 HC DAILY CARDIAC MAINTENANCE (RETAIL)

## 2023-11-22 ASSESSMENT — EXERCISE STRESS TEST
PEAK_RPE: 12
PEAK_METS: 2.7

## 2023-11-27 LAB — HBA1C MFR BLD HPLC: 5.6 %

## 2023-11-29 ENCOUNTER — HOSPITAL ENCOUNTER (OUTPATIENT)
Facility: HOSPITAL | Age: 68
Setting detail: RECURRING SERIES
Discharge: HOME | End: 2023-12-02
Payer: COMMERCIAL

## 2023-11-29 VITALS — WEIGHT: 275 LBS | BODY MASS INDEX: 36.28 KG/M2

## 2023-11-29 PROCEDURE — 93798 PHYS/QHP OP CAR RHAB W/ECG: CPT

## 2023-11-29 ASSESSMENT — EXERCISE STRESS TEST
PEAK_METS: 2.7
PEAK_RPE: 12

## 2023-12-01 ENCOUNTER — HOSPITAL ENCOUNTER (OUTPATIENT)
Facility: HOSPITAL | Age: 68
Setting detail: RECURRING SERIES
Discharge: HOME | End: 2023-12-04
Payer: COMMERCIAL

## 2023-12-01 VITALS — WEIGHT: 276 LBS | BODY MASS INDEX: 36.41 KG/M2

## 2023-12-01 PROCEDURE — 93798 PHYS/QHP OP CAR RHAB W/ECG: CPT

## 2023-12-01 ASSESSMENT — EXERCISE STRESS TEST
PEAK_METS: 2.7
PEAK_RPE: 12

## 2023-12-06 ENCOUNTER — HOSPITAL ENCOUNTER (OUTPATIENT)
Facility: HOSPITAL | Age: 68
Setting detail: RECURRING SERIES
Discharge: HOME | End: 2023-12-09
Payer: COMMERCIAL

## 2023-12-06 VITALS — WEIGHT: 277 LBS | BODY MASS INDEX: 36.55 KG/M2

## 2023-12-06 PROCEDURE — 93798 PHYS/QHP OP CAR RHAB W/ECG: CPT

## 2023-12-06 ASSESSMENT — EXERCISE STRESS TEST
PEAK_METS: 2.9
PEAK_RPE: 12

## 2023-12-06 ASSESSMENT — LIFESTYLE VARIABLES: SMOKELESS_TOBACCO: NO

## 2023-12-06 ASSESSMENT — EJECTION FRACTION: EF_VALUE: 60

## 2023-12-08 ENCOUNTER — HOSPITAL ENCOUNTER (OUTPATIENT)
Facility: HOSPITAL | Age: 68
Setting detail: RECURRING SERIES
Discharge: HOME | End: 2023-12-11
Payer: COMMERCIAL

## 2023-12-08 VITALS — WEIGHT: 277 LBS | BODY MASS INDEX: 36.55 KG/M2

## 2023-12-08 PROCEDURE — 93798 PHYS/QHP OP CAR RHAB W/ECG: CPT

## 2023-12-08 ASSESSMENT — EXERCISE STRESS TEST
PEAK_RPE: 12
PEAK_METS: 2.9

## 2023-12-11 ENCOUNTER — OFFICE VISIT (OUTPATIENT)
Dept: PRIMARY CARE CLINIC | Facility: CLINIC | Age: 68
End: 2023-12-11
Payer: COMMERCIAL

## 2023-12-11 VITALS
OXYGEN SATURATION: 97 % | SYSTOLIC BLOOD PRESSURE: 109 MMHG | RESPIRATION RATE: 12 BRPM | HEIGHT: 73 IN | DIASTOLIC BLOOD PRESSURE: 60 MMHG | HEART RATE: 64 BPM | TEMPERATURE: 97.1 F | BODY MASS INDEX: 36.18 KG/M2 | WEIGHT: 273 LBS

## 2023-12-11 DIAGNOSIS — Z01.89 ENCOUNTER FOR ROUTINE LABORATORY TESTING: ICD-10-CM

## 2023-12-11 DIAGNOSIS — Z00.00 ANNUAL PHYSICAL EXAM: ICD-10-CM

## 2023-12-11 DIAGNOSIS — Z11.4 SCREENING FOR HIV WITHOUT PRESENCE OF RISK FACTORS: ICD-10-CM

## 2023-12-11 DIAGNOSIS — Z11.59 ENCOUNTER FOR HEPATITIS C SCREENING TEST FOR LOW RISK PATIENT: ICD-10-CM

## 2023-12-11 DIAGNOSIS — E78.5 HYPERLIPIDEMIA LDL GOAL <70: ICD-10-CM

## 2023-12-11 DIAGNOSIS — Z12.11 SCREENING FOR COLORECTAL CANCER: ICD-10-CM

## 2023-12-11 DIAGNOSIS — E66.01 CLASS 2 SEVERE OBESITY DUE TO EXCESS CALORIES WITH SERIOUS COMORBIDITY AND BODY MASS INDEX (BMI) OF 36.0 TO 36.9 IN ADULT (HCC): ICD-10-CM

## 2023-12-11 DIAGNOSIS — Z87.891 FORMER SMOKER: ICD-10-CM

## 2023-12-11 DIAGNOSIS — Z12.12 SCREENING FOR COLORECTAL CANCER: ICD-10-CM

## 2023-12-11 DIAGNOSIS — E11.9 TYPE 2 DIABETES MELLITUS WITHOUT COMPLICATION, WITHOUT LONG-TERM CURRENT USE OF INSULIN (HCC): Primary | ICD-10-CM

## 2023-12-11 DIAGNOSIS — Z12.2 SCREENING FOR LUNG CANCER: ICD-10-CM

## 2023-12-11 DIAGNOSIS — Z12.5 SCREENING FOR PROSTATE CANCER: ICD-10-CM

## 2023-12-11 DIAGNOSIS — R35.1 NOCTURIA: ICD-10-CM

## 2023-12-11 DIAGNOSIS — R19.7 DIARRHEA, UNSPECIFIED TYPE: ICD-10-CM

## 2023-12-11 DIAGNOSIS — Z86.79 HISTORY OF HEART BLOCK: ICD-10-CM

## 2023-12-11 DIAGNOSIS — Z76.89 ENCOUNTER TO ESTABLISH CARE: ICD-10-CM

## 2023-12-11 DIAGNOSIS — I10 ESSENTIAL HYPERTENSION: ICD-10-CM

## 2023-12-11 DIAGNOSIS — I48.0 PAROXYSMAL ATRIAL FIBRILLATION (HCC): ICD-10-CM

## 2023-12-11 DIAGNOSIS — I25.118 CORONARY ARTERY DISEASE OF NATIVE ARTERY OF NATIVE HEART WITH STABLE ANGINA PECTORIS (HCC): ICD-10-CM

## 2023-12-11 LAB — HBA1C MFR BLD: 5.7 %

## 2023-12-11 PROCEDURE — 1123F ACP DISCUSS/DSCN MKR DOCD: CPT | Performed by: NURSE PRACTITIONER

## 2023-12-11 PROCEDURE — 3074F SYST BP LT 130 MM HG: CPT | Performed by: NURSE PRACTITIONER

## 2023-12-11 PROCEDURE — 99204 OFFICE O/P NEW MOD 45 MIN: CPT | Performed by: NURSE PRACTITIONER

## 2023-12-11 PROCEDURE — 83036 HEMOGLOBIN GLYCOSYLATED A1C: CPT | Performed by: NURSE PRACTITIONER

## 2023-12-11 PROCEDURE — 3078F DIAST BP <80 MM HG: CPT | Performed by: NURSE PRACTITIONER

## 2023-12-11 RX ORDER — ISOSORBIDE MONONITRATE 60 MG/1
30 TABLET, EXTENDED RELEASE ORAL DAILY
COMMUNITY
Start: 2023-11-27 | End: 2024-11-26

## 2023-12-11 ASSESSMENT — ENCOUNTER SYMPTOMS
EYE DISCHARGE: 0
RECTAL PAIN: 0
COUGH: 0
BLOOD IN STOOL: 0
CONSTIPATION: 0
ABDOMINAL DISTENTION: 0
SHORTNESS OF BREATH: 0
RHINORRHEA: 0
COLOR CHANGE: 0
SORE THROAT: 0
NAUSEA: 0
ABDOMINAL PAIN: 0
ANAL BLEEDING: 0
BACK PAIN: 0
CHEST TIGHTNESS: 0
VOMITING: 0
DIARRHEA: 1

## 2023-12-11 ASSESSMENT — PATIENT HEALTH QUESTIONNAIRE - PHQ9
SUM OF ALL RESPONSES TO PHQ9 QUESTIONS 1 & 2: 0
2. FEELING DOWN, DEPRESSED OR HOPELESS: 0
SUM OF ALL RESPONSES TO PHQ QUESTIONS 1-9: 0
1. LITTLE INTEREST OR PLEASURE IN DOING THINGS: 0
SUM OF ALL RESPONSES TO PHQ QUESTIONS 1-9: 0

## 2023-12-12 DIAGNOSIS — Z12.5 SCREENING FOR PROSTATE CANCER: ICD-10-CM

## 2023-12-12 DIAGNOSIS — Z00.00 ANNUAL PHYSICAL EXAM: ICD-10-CM

## 2023-12-12 DIAGNOSIS — R19.7 DIARRHEA, UNSPECIFIED TYPE: ICD-10-CM

## 2023-12-12 DIAGNOSIS — Z01.89 ENCOUNTER FOR ROUTINE LABORATORY TESTING: ICD-10-CM

## 2023-12-12 DIAGNOSIS — R97.20 ELEVATED PSA: Primary | ICD-10-CM

## 2023-12-12 DIAGNOSIS — R35.1 NOCTURIA: ICD-10-CM

## 2023-12-12 LAB
ALBUMIN SERPL-MCNC: 3.9 G/DL (ref 3.5–5)
ALBUMIN/GLOB SERPL: 1.2 (ref 1.1–2.2)
ALP SERPL-CCNC: 104 U/L (ref 45–117)
ALT SERPL-CCNC: 17 U/L (ref 12–78)
ANION GAP SERPL CALC-SCNC: 5 MMOL/L (ref 5–15)
APPEARANCE UR: CLEAR
AST SERPL-CCNC: 6 U/L (ref 15–37)
BACTERIA URNS QL MICRO: NEGATIVE /HPF
BILIRUB SERPL-MCNC: 0.8 MG/DL (ref 0.2–1)
BILIRUB UR QL: NEGATIVE
BUN SERPL-MCNC: 14 MG/DL (ref 6–20)
BUN/CREAT SERPL: 12 (ref 12–20)
CALCIUM SERPL-MCNC: 8.7 MG/DL (ref 8.5–10.1)
CHLORIDE SERPL-SCNC: 105 MMOL/L (ref 97–108)
CO2 SERPL-SCNC: 28 MMOL/L (ref 21–32)
COLOR UR: ABNORMAL
CREAT SERPL-MCNC: 1.13 MG/DL (ref 0.7–1.3)
CREAT UR-MCNC: 62 MG/DL
EPITH CASTS URNS QL MICRO: ABNORMAL /LPF
ERYTHROCYTE [DISTWIDTH] IN BLOOD BY AUTOMATED COUNT: 14.6 % (ref 11.5–14.5)
GLOBULIN SER CALC-MCNC: 3.3 G/DL (ref 2–4)
GLUCOSE SERPL-MCNC: 133 MG/DL (ref 65–100)
GLUCOSE UR STRIP.AUTO-MCNC: >1000 MG/DL
HCT VFR BLD AUTO: 41.4 % (ref 36.6–50.3)
HCV AB SER IA-ACNC: 0.08 INDEX
HCV AB SERPL QL IA: NONREACTIVE
HGB BLD-MCNC: 13.3 G/DL (ref 12.1–17)
HGB UR QL STRIP: NEGATIVE
HIV 1+2 AB+HIV1 P24 AG SERPL QL IA: NONREACTIVE
HIV 1/2 RESULT COMMENT: NORMAL
HYALINE CASTS URNS QL MICRO: ABNORMAL /LPF (ref 0–5)
KETONES UR QL STRIP.AUTO: NEGATIVE MG/DL
LEUKOCYTE ESTERASE UR QL STRIP.AUTO: NEGATIVE
MCH RBC QN AUTO: 27.9 PG (ref 26–34)
MCHC RBC AUTO-ENTMCNC: 32.1 G/DL (ref 30–36.5)
MCV RBC AUTO: 87 FL (ref 80–99)
MICROALBUMIN UR-MCNC: 1.24 MG/DL
MICROALBUMIN/CREAT UR-RTO: 20 MG/G (ref 0–30)
NITRITE UR QL STRIP.AUTO: NEGATIVE
NRBC # BLD: 0 K/UL (ref 0–0.01)
NRBC BLD-RTO: 0 PER 100 WBC
PH UR STRIP: 7 (ref 5–8)
PLATELET # BLD AUTO: 238 K/UL (ref 150–400)
PMV BLD AUTO: 10.8 FL (ref 8.9–12.9)
POTASSIUM SERPL-SCNC: 4 MMOL/L (ref 3.5–5.1)
PROT SERPL-MCNC: 7.2 G/DL (ref 6.4–8.2)
PROT UR STRIP-MCNC: NEGATIVE MG/DL
PSA SERPL-MCNC: 5.5 NG/ML (ref 0.01–4)
RBC # BLD AUTO: 4.76 M/UL (ref 4.1–5.7)
RBC #/AREA URNS HPF: ABNORMAL /HPF (ref 0–5)
SODIUM SERPL-SCNC: 138 MMOL/L (ref 136–145)
SP GR UR REFRACTOMETRY: 1.02 (ref 1–1.03)
SPECIMEN HOLD: NORMAL
UROBILINOGEN UR QL STRIP.AUTO: 1 EU/DL (ref 0.2–1)
WBC # BLD AUTO: 7 K/UL (ref 4.1–11.1)
WBC URNS QL MICRO: ABNORMAL /HPF (ref 0–4)

## 2023-12-12 NOTE — RESULT ENCOUNTER NOTE
PSA is elevated. Referral to Urology has been placed. Will need to follow up with them for additional testing.

## 2023-12-13 ENCOUNTER — HOSPITAL ENCOUNTER (OUTPATIENT)
Facility: HOSPITAL | Age: 68
Setting detail: RECURRING SERIES
Discharge: HOME | End: 2023-12-16
Payer: COMMERCIAL

## 2023-12-13 VITALS — WEIGHT: 274 LBS | BODY MASS INDEX: 36.15 KG/M2

## 2023-12-13 PROCEDURE — 93798 PHYS/QHP OP CAR RHAB W/ECG: CPT

## 2023-12-13 ASSESSMENT — EXERCISE STRESS TEST
PEAK_METS: 2.9
PEAK_RPE: 12

## 2023-12-18 NOTE — DISCUSSION/SUMMARY
Detail Level: Detailed Was A Bandage Applied: Yes Punch Size In Mm: 5 [FreeTextEntry1] : Crescendo angina/Chest pain/CAD/Prior PCI: CC Monday and if symptoms progress over weeking then instructed to go to ER. \par HTN: Suboptimal control. Will increase amlodipine to 5mg daily, will continue other meds.  Low Sodium diet discussed. Amlodipine also has anginal effects as well.\par F/U after cath.\par \par  Size Of Lesion In Cm (Optional): 0 Depth Of Punch Biopsy: dermis Biopsy Type: H and E Anesthesia Type: 1% lidocaine with epinephrine and a 1:10 solution of 8.4% sodium bicarbonate Anesthesia Volume In Cc (Will Not Render If 0): 0.5 Hemostasis: None Epidermal Sutures: 4-0 Prolene Number Of Epidermal Sutures (Optional): 1 Wound Care: Petrolatum Dressing: bandage Suture Removal: 14 days Patient Will Remove Sutures At Home?: No Lab: [address] Notes (To The Dermatopathologist): Please process in 2 planes for hair loss Consent: Written consent was obtained and risks were reviewed including but not limited to scarring, infection, bleeding, scabbing, incomplete removal, nerve damage and allergy to anesthesia. Post-Care Instructions: I reviewed with the patient in detail post-care instructions. Patient is to keep the biopsy site dry overnight, and then apply petrolatum twice daily until healed. Home Suture Removal Text: Patient was provided a home suture removal kit and will remove their sutures at home.  If they have any questions or difficulties they will call the office. Notification Instructions: Patient will be notified of biopsy results. However, patient instructed to call the office if not contacted within 2 weeks. Billing Type: Third-Party Bill Information: Selecting Yes will display possible errors in your note based on the variables you have selected. This validation is only offered as a suggestion for you. PLEASE NOTE THAT THE VALIDATION TEXT WILL BE REMOVED WHEN YOU FINALIZE YOUR NOTE. IF YOU WANT TO FAX A PRELIMINARY NOTE YOU WILL NEED TO TOGGLE THIS TO 'NO' IF YOU DO NOT WANT IT IN YOUR FAXED NOTE.

## 2023-12-20 NOTE — REVIEW OF SYSTEMS
[FreeTextEntry8] : pt denied thoughts of suicide [FreeTextEntry7] : no evidence of current violent or homicidal ideation [Eyeglasses] : currently wearing eyeglasses [see HPI] : see HPI [Negative] : Heme/Lymph

## 2023-12-27 ENCOUNTER — HOSPITAL ENCOUNTER (OUTPATIENT)
Facility: HOSPITAL | Age: 68
Setting detail: RECURRING SERIES
Discharge: HOME | End: 2023-12-30
Payer: COMMERCIAL

## 2023-12-27 VITALS — WEIGHT: 273 LBS | BODY MASS INDEX: 36.02 KG/M2

## 2023-12-27 PROCEDURE — 93798 PHYS/QHP OP CAR RHAB W/ECG: CPT

## 2024-01-03 ENCOUNTER — HOSPITAL ENCOUNTER (OUTPATIENT)
Facility: HOSPITAL | Age: 69
Setting detail: RECURRING SERIES
Discharge: HOME | End: 2024-01-06
Payer: COMMERCIAL

## 2024-01-03 VITALS — BODY MASS INDEX: 36.02 KG/M2 | WEIGHT: 273 LBS

## 2024-01-03 PROCEDURE — 93798 PHYS/QHP OP CAR RHAB W/ECG: CPT

## 2024-01-03 ASSESSMENT — EXERCISE STRESS TEST
PEAK_RPE: 12
PEAK_METS: 2.9

## 2024-01-05 ENCOUNTER — APPOINTMENT (OUTPATIENT)
Facility: HOSPITAL | Age: 69
End: 2024-01-05
Payer: COMMERCIAL

## 2024-01-10 ENCOUNTER — HOSPITAL ENCOUNTER (OUTPATIENT)
Facility: HOSPITAL | Age: 69
Setting detail: RECURRING SERIES
Discharge: HOME | End: 2024-01-13
Payer: COMMERCIAL

## 2024-01-10 VITALS — BODY MASS INDEX: 35.89 KG/M2 | WEIGHT: 272 LBS

## 2024-01-10 PROCEDURE — 93798 PHYS/QHP OP CAR RHAB W/ECG: CPT

## 2024-01-10 ASSESSMENT — EXERCISE STRESS TEST
PEAK_RPE: 12
PEAK_METS: 2.9

## 2024-01-12 ENCOUNTER — HOSPITAL ENCOUNTER (OUTPATIENT)
Facility: HOSPITAL | Age: 69
Setting detail: RECURRING SERIES
Discharge: HOME | End: 2024-01-15
Payer: COMMERCIAL

## 2024-01-12 VITALS — BODY MASS INDEX: 36.02 KG/M2 | WEIGHT: 273 LBS

## 2024-01-12 PROCEDURE — 93798 PHYS/QHP OP CAR RHAB W/ECG: CPT

## 2024-01-12 ASSESSMENT — EXERCISE STRESS TEST
PEAK_METS: 2.9
PEAK_RPE: 12

## 2024-01-17 ENCOUNTER — HOSPITAL ENCOUNTER (OUTPATIENT)
Facility: HOSPITAL | Age: 69
Setting detail: RECURRING SERIES
Discharge: HOME | End: 2024-01-20
Payer: COMMERCIAL

## 2024-01-17 VITALS — WEIGHT: 273 LBS | BODY MASS INDEX: 36.02 KG/M2

## 2024-01-17 PROCEDURE — 93798 PHYS/QHP OP CAR RHAB W/ECG: CPT

## 2024-01-17 ASSESSMENT — EXERCISE STRESS TEST
PEAK_RPE: 12
PEAK_METS: 3.8

## 2024-01-19 ENCOUNTER — APPOINTMENT (OUTPATIENT)
Facility: HOSPITAL | Age: 69
End: 2024-01-19
Payer: COMMERCIAL

## 2024-01-24 ENCOUNTER — APPOINTMENT (OUTPATIENT)
Facility: HOSPITAL | Age: 69
End: 2024-01-24
Payer: COMMERCIAL

## 2024-01-26 ENCOUNTER — HOSPITAL ENCOUNTER (OUTPATIENT)
Facility: HOSPITAL | Age: 69
Setting detail: RECURRING SERIES
Discharge: HOME | End: 2024-01-29
Payer: COMMERCIAL

## 2024-01-26 VITALS — WEIGHT: 272 LBS | BODY MASS INDEX: 35.89 KG/M2

## 2024-01-26 PROCEDURE — 93798 PHYS/QHP OP CAR RHAB W/ECG: CPT

## 2024-01-26 ASSESSMENT — EXERCISE STRESS TEST
PEAK_HR: 97
PEAK_RPE: 12
PEAK_HR: 97
PEAK_BP: 138/70
PEAK_BP: 138/70
PEAK_METS: 3.2
PEAK_RPE: 13
PEAK_BP: 138/70

## 2024-01-26 ASSESSMENT — LIFESTYLE VARIABLES: SMOKELESS_TOBACCO: NO

## 2024-01-26 ASSESSMENT — PATIENT HEALTH QUESTIONNAIRE - PHQ9
8. MOVING OR SPEAKING SO SLOWLY THAT OTHER PEOPLE COULD HAVE NOTICED. OR THE OPPOSITE, BEING SO FIGETY OR RESTLESS THAT YOU HAVE BEEN MOVING AROUND A LOT MORE THAN USUAL: 0
7. TROUBLE CONCENTRATING ON THINGS, SUCH AS READING THE NEWSPAPER OR WATCHING TELEVISION: 0
SUM OF ALL RESPONSES TO PHQ QUESTIONS 1-9: 1
SUM OF ALL RESPONSES TO PHQ QUESTIONS 1-9: 1
6. FEELING BAD ABOUT YOURSELF - OR THAT YOU ARE A FAILURE OR HAVE LET YOURSELF OR YOUR FAMILY DOWN: 1
3. TROUBLE FALLING OR STAYING ASLEEP: 0
5. POOR APPETITE OR OVEREATING: 0
SUM OF ALL RESPONSES TO PHQ QUESTIONS 1-9: 1
10. IF YOU CHECKED OFF ANY PROBLEMS, HOW DIFFICULT HAVE THESE PROBLEMS MADE IT FOR YOU TO DO YOUR WORK, TAKE CARE OF THINGS AT HOME, OR GET ALONG WITH OTHER PEOPLE: 0
4. FEELING TIRED OR HAVING LITTLE ENERGY: 0
SUM OF ALL RESPONSES TO PHQ QUESTIONS 1-9: 1
9. THOUGHTS THAT YOU WOULD BE BETTER OFF DEAD, OR OF HURTING YOURSELF: 0

## 2024-01-26 ASSESSMENT — EJECTION FRACTION: EF_VALUE: 60

## 2024-01-26 NOTE — CARDIO/PULMONARY
Usman Esteban came to CR today for discharge appointment. Exit exercise test done on Treadmill after warmup.Usman Esteban had to stop exercising at about 3:30 minutes for complaint of chest pain. HR 97 and blood pressure was 138/70. Chest pain  subsided quickly with rest.Usman Esteban stated \"I have never walked this fast. I start at 1.7 and eventually increase to 2.2 \". He did not wish to finish workout. Discharged from exercise without further complaints.Vanessa Peters RN

## 2024-01-29 NOTE — CARDIO/PULMONARY
DISCHARGE SUMMARY NOTE  2024      NAME: Usman Esteban : 1955 AGE: 68 y.o.  GENDER: male    CARDIAC REHAB ADMITTING DIAGNOSIS: Angina pectoris, unspecified [I20.9]    REFERRING PHYSICIAN: Zack Bernard MD    MEDICAL HX:  Past Medical History:   Diagnosis Date    CAD (coronary artery disease) 2023    stent, pt states that he has had 3 MIs and 29 stents in the past    Diabetes (HCC)     Hypertension        LABS:     Lab Results   Component Value Date/Time    [id number] 5.7 2023 10:32 AM     No results found for: \"CHOL\", \"CHOLPOCT\", \"CHOLX\", \"CHLST\", \"CHOLV\", \"HDL\", \"HDLPOC\", \"HDLC\", \"LDL\", \"LDLC\", \"VLDLC\", \"VLDL\", \"TGLX\", \"TRIGL\"      ANTHROPOMETRICS:      Ht Readings from Last 1 Encounters:   23 1.854 m (6' 1\")      Wt Readings from Last 1 Encounters:   24 123.4 kg (272 lb)        WAIST: 50         PROGRAM SUMMARY:    Usman Esteabn completed 30 sessions in phase II of the Cardiac Rehab program. Patient walked 0.12 mi at a speed of 2.2 mph and grade of 1.5 % for a final MET level of 3.2.  He had some chest tightness on the TM and did not finish the walk test.  CP subsided with a few minutes of rest.  Patient agreed to report and follow up with his cardiologist. Usman Esteban plans to continue exercising at home and/or a gym and has set revised goals that include 30 minutes of moderate-intensity aerobic activity 3-5 days weekly.      MET level increased from 2.6 at intake to 3.2 at discharge. Weight lost was 1 lbs.  Usman Esteban will focus on diet and nutrition at home and has received individualized healthy nutrition recommendations from [facility] Cardiac Rehab staff.  They are aware of their cardiac disease risk factors and cardiac medications. All questions were addressed and discharge plans discussed. Usman Esteban verbalized understanding.      Racheal Mahoney RN  2024

## 2024-03-10 NOTE — DISCHARGE NOTE ADULT - DO YOU HAVE DIFFICULTY CLIMBING STAIRS
Pt received on stretcher, A&Ox4, no labored breathing, Welsh speaking, pt's daughter is at bedside who speaks english. Pt is ambulatory with steady gait, denies any pain.
No

## 2024-05-01 ENCOUNTER — COMMUNITY OUTREACH (OUTPATIENT)
Dept: PRIMARY CARE CLINIC | Facility: CLINIC | Age: 69
End: 2024-05-01

## 2024-10-28 ENCOUNTER — TRANSCRIBE ORDERS (OUTPATIENT)
Facility: HOSPITAL | Age: 69
End: 2024-10-28

## 2024-10-28 DIAGNOSIS — Z98.61 POST PTCA: Primary | ICD-10-CM

## 2024-11-06 ENCOUNTER — HOSPITAL ENCOUNTER (OUTPATIENT)
Facility: HOSPITAL | Age: 69
Setting detail: RECURRING SERIES
Discharge: HOME | End: 2024-11-09
Payer: COMMERCIAL

## 2024-11-06 VITALS — WEIGHT: 277 LBS | HEIGHT: 73 IN | BODY MASS INDEX: 36.71 KG/M2

## 2024-11-06 PROCEDURE — 93798 PHYS/QHP OP CAR RHAB W/ECG: CPT

## 2024-11-06 PROCEDURE — 93797 PHYS/QHP OP CAR RHAB WO ECG: CPT

## 2024-11-06 RX ORDER — AMLODIPINE BESYLATE 5 MG/1
5 TABLET ORAL DAILY
COMMUNITY

## 2024-11-06 RX ORDER — EVOLOCUMAB 140 MG/ML
140 INJECTION, SOLUTION SUBCUTANEOUS
COMMUNITY

## 2024-11-06 RX ORDER — EZETIMIBE 10 MG/1
10 TABLET ORAL DAILY
COMMUNITY

## 2024-11-06 ASSESSMENT — PATIENT HEALTH QUESTIONNAIRE - PHQ9
7. TROUBLE CONCENTRATING ON THINGS, SUCH AS READING THE NEWSPAPER OR WATCHING TELEVISION: NOT AT ALL
SUM OF ALL RESPONSES TO PHQ QUESTIONS 1-9: 1
1. LITTLE INTEREST OR PLEASURE IN DOING THINGS: NOT AT ALL
3. TROUBLE FALLING OR STAYING ASLEEP: SEVERAL DAYS
9. THOUGHTS THAT YOU WOULD BE BETTER OFF DEAD, OR OF HURTING YOURSELF: NOT AT ALL
10. IF YOU CHECKED OFF ANY PROBLEMS, HOW DIFFICULT HAVE THESE PROBLEMS MADE IT FOR YOU TO DO YOUR WORK, TAKE CARE OF THINGS AT HOME, OR GET ALONG WITH OTHER PEOPLE: NOT DIFFICULT AT ALL
SUM OF ALL RESPONSES TO PHQ QUESTIONS 1-9: 1
5. POOR APPETITE OR OVEREATING: NOT AT ALL
SUM OF ALL RESPONSES TO PHQ QUESTIONS 1-9: 1
2. FEELING DOWN, DEPRESSED OR HOPELESS: NOT AT ALL
SUM OF ALL RESPONSES TO PHQ9 QUESTIONS 1 & 2: 0
6. FEELING BAD ABOUT YOURSELF - OR THAT YOU ARE A FAILURE OR HAVE LET YOURSELF OR YOUR FAMILY DOWN: NOT AT ALL
4. FEELING TIRED OR HAVING LITTLE ENERGY: NOT AT ALL
8. MOVING OR SPEAKING SO SLOWLY THAT OTHER PEOPLE COULD HAVE NOTICED. OR THE OPPOSITE, BEING SO FIGETY OR RESTLESS THAT YOU HAVE BEEN MOVING AROUND A LOT MORE THAN USUAL: NOT AT ALL
SUM OF ALL RESPONSES TO PHQ QUESTIONS 1-9: 1

## 2024-11-06 ASSESSMENT — EXERCISE STRESS TEST
PEAK_METS: 2.1
PEAK_BP: 152/82
PEAK_HR: 86
PEAK_RPE: 12

## 2024-11-07 ENCOUNTER — HOSPITAL ENCOUNTER (OUTPATIENT)
Facility: HOSPITAL | Age: 69
Setting detail: RECURRING SERIES
Discharge: HOME | End: 2024-11-10
Payer: COMMERCIAL

## 2024-11-07 VITALS — WEIGHT: 275 LBS | BODY MASS INDEX: 36.28 KG/M2

## 2024-11-07 PROCEDURE — 93798 PHYS/QHP OP CAR RHAB W/ECG: CPT

## 2024-11-07 ASSESSMENT — EXERCISE STRESS TEST
PEAK_METS: 2.1
PEAK_RPE: 12

## 2024-11-07 NOTE — CARDIO/PULMONARY
INTAKE APPOINTMENT NOTE  2024    NAME: Usman Esteban : 1955 AGE: 69 y.o.  GENDER: male    CARDIAC REHAB ADMITTING DIAGNOSIS: Post PTCA [Z98.61]    REFERRING PHYSICIAN: Finesse Austin MD    MEDICAL HX:  Past Medical History:   Diagnosis Date    CAD (coronary artery disease) 2023    stent, pt states that he has had 3 MIs and 29 stents in the past, received another stent 10/11/24    Diabetes (HCC)     Hypertension        LABS:     Lab Results   Component Value Date/Time    [id number] 5.7 2023 10:32 AM     No results found for: \"CHOL\", \"CHOLPOCT\", \"CHLST\", \"CHOLV\", \"HDL\", \"HDLPOC\", \"HDLC\", \"LDL\", \"LDLC\", \"VLDLC\", \"VLDL\"      ANTHROPOMETRICS:      Ht Readings from Last 1 Encounters:   24 1.854 m (6' 1\")      Wt Readings from Last 1 Encounters:   24 124.7 kg (275 lb)        WAIST: 50       VISIT SUMMARY:    Usman Esteban 69 y.o. presented to Cardiac Rehab for program orientation and 6 minute walk test/ETT today with a primary diagnosis of Post PTCA [Z98.61]. EF is 35 % Cardiac risk factors include family history, dyslipidemia, diabetes mellitus, obesity, hypertension, inactivity .  Jose Raul is a quit smoking in .    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      Patient was evaluated for depression using the PHQ-9 assessment tool with a result of 1 which is considered mild.  He states that his wife is going through cancer treatment again and that is sometimes stressful.    Patient denied chest pain or SOB during 6 minute walk test/ETT and was in SR 1 AVB, rare PACs, 1 PVC, notched P wave. Patient walked 0.14 mi at a speed of 1.4 mph and grade of 0 % for a final MET level of 2.1. Exercise prescription developed using exercise tolerance results and patient stated goals, to be supplemented with home exercise recommendations. Education manual given to patient and reviewed. Patient will attend cardiac rehab 2-3 times/week which will include both exercise and

## 2024-11-14 ENCOUNTER — HOSPITAL ENCOUNTER (OUTPATIENT)
Facility: HOSPITAL | Age: 69
Setting detail: RECURRING SERIES
Discharge: HOME | End: 2024-11-17
Payer: COMMERCIAL

## 2024-11-14 VITALS — BODY MASS INDEX: 36.28 KG/M2 | WEIGHT: 275 LBS

## 2024-11-14 PROCEDURE — 93798 PHYS/QHP OP CAR RHAB W/ECG: CPT

## 2024-11-14 ASSESSMENT — EXERCISE STRESS TEST
PEAK_RPE: 12
PEAK_METS: 2.2

## 2024-12-05 ENCOUNTER — HOSPITAL ENCOUNTER (OUTPATIENT)
Facility: HOSPITAL | Age: 69
Setting detail: RECURRING SERIES
End: 2024-12-05
Payer: COMMERCIAL

## 2024-12-12 ENCOUNTER — HOSPITAL ENCOUNTER (OUTPATIENT)
Facility: HOSPITAL | Age: 69
Setting detail: RECURRING SERIES
Discharge: HOME | End: 2024-12-15
Payer: COMMERCIAL

## 2024-12-12 VITALS — BODY MASS INDEX: 35.89 KG/M2 | WEIGHT: 272 LBS

## 2024-12-12 PROCEDURE — 93798 PHYS/QHP OP CAR RHAB W/ECG: CPT

## 2024-12-12 ASSESSMENT — EXERCISE STRESS TEST
PEAK_RPE: 12
PEAK_METS: 2.2

## 2024-12-19 ENCOUNTER — HOSPITAL ENCOUNTER (OUTPATIENT)
Facility: HOSPITAL | Age: 69
Setting detail: RECURRING SERIES
Discharge: HOME | End: 2024-12-22
Payer: COMMERCIAL

## 2024-12-19 VITALS — WEIGHT: 271 LBS | BODY MASS INDEX: 35.75 KG/M2

## 2024-12-19 PROCEDURE — 93798 PHYS/QHP OP CAR RHAB W/ECG: CPT

## 2024-12-19 ASSESSMENT — EXERCISE STRESS TEST
PEAK_METS: 2.2
PEAK_RPE: 12

## 2024-12-26 ENCOUNTER — HOSPITAL ENCOUNTER (OUTPATIENT)
Facility: HOSPITAL | Age: 69
Setting detail: RECURRING SERIES
Discharge: HOME | End: 2024-12-29
Payer: COMMERCIAL

## 2024-12-26 VITALS — BODY MASS INDEX: 35.75 KG/M2 | WEIGHT: 271 LBS

## 2024-12-26 PROCEDURE — 93798 PHYS/QHP OP CAR RHAB W/ECG: CPT

## 2024-12-26 ASSESSMENT — EXERCISE STRESS TEST
PEAK_METS: 2.2
PEAK_RPE: 12

## 2025-01-02 ENCOUNTER — APPOINTMENT (OUTPATIENT)
Facility: HOSPITAL | Age: 70
End: 2025-01-02
Payer: COMMERCIAL

## 2025-01-09 ENCOUNTER — HOSPITAL ENCOUNTER (OUTPATIENT)
Facility: HOSPITAL | Age: 70
Setting detail: RECURRING SERIES
Discharge: HOME | End: 2025-01-12
Payer: COMMERCIAL

## 2025-01-09 VITALS — BODY MASS INDEX: 35.89 KG/M2 | WEIGHT: 272 LBS

## 2025-01-09 PROCEDURE — 93798 PHYS/QHP OP CAR RHAB W/ECG: CPT

## 2025-01-09 ASSESSMENT — EXERCISE STRESS TEST
PEAK_RPE: 12
PEAK_METS: 2.2

## 2025-01-16 ENCOUNTER — HOSPITAL ENCOUNTER (OUTPATIENT)
Facility: HOSPITAL | Age: 70
Setting detail: RECURRING SERIES
Discharge: HOME | End: 2025-01-19
Payer: COMMERCIAL

## 2025-01-16 VITALS — WEIGHT: 274 LBS | BODY MASS INDEX: 36.15 KG/M2

## 2025-01-16 PROCEDURE — 93798 PHYS/QHP OP CAR RHAB W/ECG: CPT

## 2025-01-16 ASSESSMENT — EXERCISE STRESS TEST
PEAK_METS: 2.2
PEAK_RPE: 12

## 2025-01-23 ENCOUNTER — HOSPITAL ENCOUNTER (OUTPATIENT)
Facility: HOSPITAL | Age: 70
Setting detail: RECURRING SERIES
Discharge: HOME | End: 2025-01-26
Payer: COMMERCIAL

## 2025-01-23 VITALS — BODY MASS INDEX: 36.15 KG/M2 | WEIGHT: 274 LBS

## 2025-01-23 PROCEDURE — 93798 PHYS/QHP OP CAR RHAB W/ECG: CPT

## 2025-01-23 ASSESSMENT — EXERCISE STRESS TEST
PEAK_RPE: 12
PEAK_METS: 2.2

## 2025-01-30 ENCOUNTER — HOSPITAL ENCOUNTER (OUTPATIENT)
Facility: HOSPITAL | Age: 70
Setting detail: RECURRING SERIES
End: 2025-01-30
Payer: COMMERCIAL

## 2025-02-25 ENCOUNTER — TELEPHONE (OUTPATIENT)
Facility: HOSPITAL | Age: 70
End: 2025-02-25

## 2025-02-25 NOTE — PATIENT PROFILE ADULT. - MEDICATIONS BROUGHT TO HOSPITAL, PROFILE
The following individual(s) verbally consented to be recorded using ambient AI listening technology and understand that they can each withdraw their consent to this listening technology at any point by asking the clinician to turn off or pause the recording:    Patient name: Helga Parson Michael  Additional names:       no

## 2025-02-25 NOTE — TELEPHONE ENCOUNTER
2/25/25 - I called Usman Esteban today to discuss his cardiac rehab participation intentions.  He said he is feeling better and will call us back to schedule an appointment.  We will continue to follow up.  Racheal Mahoney RN

## 2025-03-10 ENCOUNTER — TELEPHONE (OUTPATIENT)
Facility: HOSPITAL | Age: 70
End: 2025-03-10

## 2025-03-10 NOTE — TELEPHONE ENCOUNTER
Spoke to Usman Esteban over the phone regarding return to Cardiac Rehab program. He has been unable to attend CR regularly d/t various reasons and has been put on hold. We will continue to follow up with patient regarding return.  Ericka Espinoza, RN, BSN

## 2025-03-24 ENCOUNTER — TELEPHONE (OUTPATIENT)
Facility: HOSPITAL | Age: 70
End: 2025-03-24

## 2025-03-24 NOTE — TELEPHONE ENCOUNTER
3/24/25 - Final call to Usman Esteban to encourage return to OP Cardiac Rehab participation.  He has not been here since 1/23/25 for a variety of reasons (illness).  Today I spoke with him and he was still not able to commit to a return date.  I explained that if we did not hear from him by 5/1/25 we would assume he did not intend to return and would discharge him.  Racheal Mahoney RN

## (undated) DEVICE — RUNTHROUGH NS EXTRA FLOPPY PTCA GUIDEWIRE: Brand: RUNTHROUGH

## (undated) DEVICE — ANGIOGRAPHY KIT

## (undated) DEVICE — CATH ANGI BLLN DIL 3.0X20MM -- NC EUPHORA

## (undated) DEVICE — KIT AT-X65 ANGIOTOUCH HAND CONTROLLER

## (undated) DEVICE — CATHETER DIAG 5FR L100CM LUMN ID0.047IN JL3.5 CRV 0 SIDE H

## (undated) DEVICE — CATH BLLN ANGIO 4X20MM NC EUPHORIA RX

## (undated) DEVICE — CATH BLLN ANGIO 5X15MM NC EUPHORIA RX

## (undated) DEVICE — GUIDEWIRE VASC L260CM DIA0.035IN TIP L3MM STD EXCHG PTFE J

## (undated) DEVICE — CUSTOM KT PTCA INFL DEV K05 00052M

## (undated) DEVICE — KIT MFLD ISOLATN NACL CNTRST PRT TBNG SPIK W/ PRSS TRNSDUC

## (undated) DEVICE — GLIDESHEATH SLENDER ACCESS KIT: Brand: GLIDESHEATH SLENDER

## (undated) DEVICE — SPECIAL PROCEDURE DRAPE 32" X 34": Brand: SPECIAL PROCEDURE DRAPE

## (undated) DEVICE — Device: Brand: EAGLE EYE PLATINUM RX DIGITAL IVUS CATHETER

## (undated) DEVICE — PACK PROCEDURE SURG HRT CATH

## (undated) DEVICE — COPILOT BLEEDBACK CONTROL VALVE: Brand: COPILOT

## (undated) DEVICE — WASTE KIT - ST MARY: Brand: MEDLINE INDUSTRIES, INC.

## (undated) DEVICE — CATH GUID COR JR4.0S 6FR 100CM -- LAUNCHER

## (undated) DEVICE — KIT HND CTRL 3 W STPCOCK ROT END 54IN PREM HI PRSS TBNG AT

## (undated) DEVICE — MEDI-TRACE CADENCE ADULT, DEFIBRILLATION ELECTRODE -RTS  (10 PR/PK) - PHYSIO-CONTROL: Brand: MEDI-TRACE CADENCE

## (undated) DEVICE — CATH 5F 100CM JR40 -- DXTERITY

## (undated) DEVICE — KIT MED IMAG CNTRST AGNT W/ IOPAMIDOL REUSE

## (undated) DEVICE — CATH BLLN ANGIO 3.50X20MM NC EUPHORIA RX

## (undated) DEVICE — GUIDE EXTENSION CATHETER: Brand: GUIDEZILLA™ II

## (undated) DEVICE — TR BAND RADIAL ARTERY COMPRESSION DEVICE: Brand: TR BAND